# Patient Record
Sex: FEMALE | Race: WHITE | NOT HISPANIC OR LATINO | Employment: OTHER | URBAN - METROPOLITAN AREA
[De-identification: names, ages, dates, MRNs, and addresses within clinical notes are randomized per-mention and may not be internally consistent; named-entity substitution may affect disease eponyms.]

---

## 2023-08-15 LAB — URINE CULTURE: NORMAL

## 2023-08-16 LAB
ALANINE AMINOTRANSFERASE (SGPT) (U/L) IN SER/PLAS: 13 U/L (ref 7–45)
ALBUMIN (G/DL) IN SER/PLAS: 4.2 G/DL (ref 3.4–5)
ALKALINE PHOSPHATASE (U/L) IN SER/PLAS: 101 U/L (ref 33–136)
ANION GAP IN SER/PLAS: 13 MMOL/L (ref 10–20)
ASPARTATE AMINOTRANSFERASE (SGOT) (U/L) IN SER/PLAS: 19 U/L (ref 9–39)
BILIRUBIN TOTAL (MG/DL) IN SER/PLAS: 0.6 MG/DL (ref 0–1.2)
CALCIDIOL (25 OH VITAMIN D3) (NG/ML) IN SER/PLAS: 58 NG/ML
CALCIUM (MG/DL) IN SER/PLAS: 9.7 MG/DL (ref 8.6–10.3)
CARBON DIOXIDE, TOTAL (MMOL/L) IN SER/PLAS: 27 MMOL/L (ref 21–32)
CHLORIDE (MMOL/L) IN SER/PLAS: 100 MMOL/L (ref 98–107)
CHOLESTEROL (MG/DL) IN SER/PLAS: 172 MG/DL (ref 0–199)
CHOLESTEROL IN HDL (MG/DL) IN SER/PLAS: 51.7 MG/DL
CHOLESTEROL/HDL RATIO: 3.3
COBALAMIN (VITAMIN B12) (PG/ML) IN SER/PLAS: 1405 PG/ML (ref 211–911)
CREATININE (MG/DL) IN SER/PLAS: 0.6 MG/DL (ref 0.5–1.05)
ERYTHROCYTE DISTRIBUTION WIDTH (RATIO) BY AUTOMATED COUNT: 13.4 % (ref 11.5–14.5)
ERYTHROCYTE MEAN CORPUSCULAR HEMOGLOBIN CONCENTRATION (G/DL) BY AUTOMATED: 32.2 G/DL (ref 32–36)
ERYTHROCYTE MEAN CORPUSCULAR VOLUME (FL) BY AUTOMATED COUNT: 91 FL (ref 80–100)
ERYTHROCYTES (10*6/UL) IN BLOOD BY AUTOMATED COUNT: 4.11 X10E12/L (ref 4–5.2)
ESTIMATED AVERAGE GLUCOSE FOR HBA1C: 123 MG/DL
GFR FEMALE: 83 ML/MIN/1.73M2
GLUCOSE (MG/DL) IN SER/PLAS: 94 MG/DL (ref 74–99)
HEMATOCRIT (%) IN BLOOD BY AUTOMATED COUNT: 37.6 % (ref 36–46)
HEMOGLOBIN (G/DL) IN BLOOD: 12.1 G/DL (ref 12–16)
HEMOGLOBIN A1C/HEMOGLOBIN TOTAL IN BLOOD: 5.9 %
LDL: 107 MG/DL (ref 0–99)
LEUKOCYTES (10*3/UL) IN BLOOD BY AUTOMATED COUNT: 6.9 X10E9/L (ref 4.4–11.3)
NRBC (PER 100 WBCS) BY AUTOMATED COUNT: 0 /100 WBC (ref 0–0)
PLATELETS (10*3/UL) IN BLOOD AUTOMATED COUNT: 259 X10E9/L (ref 150–450)
POTASSIUM (MMOL/L) IN SER/PLAS: 4.2 MMOL/L (ref 3.5–5.3)
PROTEIN TOTAL: 7.5 G/DL (ref 6.4–8.2)
SODIUM (MMOL/L) IN SER/PLAS: 136 MMOL/L (ref 136–145)
THYROTROPIN (MIU/L) IN SER/PLAS BY DETECTION LIMIT <= 0.05 MIU/L: 4.72 MIU/L (ref 0.44–3.98)
THYROXINE (T4) FREE (NG/DL) IN SER/PLAS: 0.77 NG/DL (ref 0.61–1.12)
TRIGLYCERIDE (MG/DL) IN SER/PLAS: 68 MG/DL (ref 0–149)
UREA NITROGEN (MG/DL) IN SER/PLAS: 12 MG/DL (ref 6–23)
VLDL: 14 MG/DL (ref 0–40)

## 2023-11-09 ENCOUNTER — HOSPITAL ENCOUNTER (EMERGENCY)
Facility: HOSPITAL | Age: 88
Discharge: HOME | End: 2023-11-09
Attending: EMERGENCY MEDICINE
Payer: MEDICARE

## 2023-11-09 ENCOUNTER — APPOINTMENT (OUTPATIENT)
Dept: RADIOLOGY | Facility: HOSPITAL | Age: 88
End: 2023-11-09
Payer: MEDICARE

## 2023-11-09 VITALS
WEIGHT: 110 LBS | HEIGHT: 60 IN | DIASTOLIC BLOOD PRESSURE: 79 MMHG | BODY MASS INDEX: 21.6 KG/M2 | RESPIRATION RATE: 18 BRPM | HEART RATE: 98 BPM | SYSTOLIC BLOOD PRESSURE: 122 MMHG | TEMPERATURE: 97.5 F | OXYGEN SATURATION: 96 %

## 2023-11-09 DIAGNOSIS — R53.1 GENERALIZED WEAKNESS: Primary | ICD-10-CM

## 2023-11-09 DIAGNOSIS — R51.9 ACUTE NONINTRACTABLE HEADACHE, UNSPECIFIED HEADACHE TYPE: ICD-10-CM

## 2023-11-09 LAB
ALBUMIN SERPL BCP-MCNC: 4 G/DL (ref 3.4–5)
ALP SERPL-CCNC: 100 U/L (ref 33–136)
ALT SERPL W P-5'-P-CCNC: 23 U/L (ref 7–45)
ANION GAP SERPL CALC-SCNC: 13 MMOL/L (ref 10–20)
APPEARANCE UR: ABNORMAL
AST SERPL W P-5'-P-CCNC: 51 U/L (ref 9–39)
BASOPHILS # BLD AUTO: 0.02 X10*3/UL (ref 0–0.1)
BASOPHILS NFR BLD AUTO: 0.4 %
BILIRUB SERPL-MCNC: 0.5 MG/DL (ref 0–1.2)
BILIRUB UR STRIP.AUTO-MCNC: NEGATIVE MG/DL
BUN SERPL-MCNC: 16 MG/DL (ref 6–23)
CALCIUM SERPL-MCNC: 9.3 MG/DL (ref 8.6–10.3)
CARDIAC TROPONIN I PNL SERPL HS: 73 NG/L (ref 0–13)
CARDIAC TROPONIN I PNL SERPL HS: 95 NG/L (ref 0–13)
CHLORIDE SERPL-SCNC: 95 MMOL/L (ref 98–107)
CO2 SERPL-SCNC: 27 MMOL/L (ref 21–32)
COLOR UR: YELLOW
CREAT SERPL-MCNC: 0.62 MG/DL (ref 0.5–1.05)
EOSINOPHIL # BLD AUTO: 0 X10*3/UL (ref 0–0.4)
EOSINOPHIL NFR BLD AUTO: 0 %
ERYTHROCYTE [DISTWIDTH] IN BLOOD BY AUTOMATED COUNT: 13.4 % (ref 11.5–14.5)
GFR SERPL CREATININE-BSD FRML MDRD: 83 ML/MIN/1.73M*2
GLUCOSE SERPL-MCNC: 92 MG/DL (ref 74–99)
GLUCOSE UR STRIP.AUTO-MCNC: NEGATIVE MG/DL
HCT VFR BLD AUTO: 38.8 % (ref 36–46)
HGB BLD-MCNC: 12.9 G/DL (ref 12–16)
HOLD SPECIMEN: NORMAL
IMM GRANULOCYTES # BLD AUTO: 0.02 X10*3/UL (ref 0–0.5)
IMM GRANULOCYTES NFR BLD AUTO: 0.4 % (ref 0–0.9)
KETONES UR STRIP.AUTO-MCNC: ABNORMAL MG/DL
LEUKOCYTE ESTERASE UR QL STRIP.AUTO: NEGATIVE
LYMPHOCYTES # BLD AUTO: 0.55 X10*3/UL (ref 0.8–3)
LYMPHOCYTES NFR BLD AUTO: 10.9 %
MAGNESIUM SERPL-MCNC: 1.64 MG/DL (ref 1.6–2.4)
MCH RBC QN AUTO: 30.5 PG (ref 26–34)
MCHC RBC AUTO-ENTMCNC: 33.2 G/DL (ref 32–36)
MCV RBC AUTO: 92 FL (ref 80–100)
MONOCYTES # BLD AUTO: 0.9 X10*3/UL (ref 0.05–0.8)
MONOCYTES NFR BLD AUTO: 17.9 %
MUCOUS THREADS #/AREA URNS AUTO: NORMAL /LPF
NEUTROPHILS # BLD AUTO: 3.55 X10*3/UL (ref 1.6–5.5)
NEUTROPHILS NFR BLD AUTO: 70.4 %
NITRITE UR QL STRIP.AUTO: NEGATIVE
NRBC BLD-RTO: 0 /100 WBCS (ref 0–0)
PH UR STRIP.AUTO: 5 [PH]
PLATELET # BLD AUTO: 184 X10*3/UL (ref 150–450)
POTASSIUM SERPL-SCNC: 4 MMOL/L (ref 3.5–5.3)
PROT SERPL-MCNC: 6.9 G/DL (ref 6.4–8.2)
PROT UR STRIP.AUTO-MCNC: ABNORMAL MG/DL
RBC # BLD AUTO: 4.23 X10*6/UL (ref 4–5.2)
RBC # UR STRIP.AUTO: ABNORMAL /UL
RBC #/AREA URNS AUTO: NORMAL /HPF
SODIUM SERPL-SCNC: 131 MMOL/L (ref 136–145)
SP GR UR STRIP.AUTO: 1.01
SQUAMOUS #/AREA URNS AUTO: NORMAL /HPF
UROBILINOGEN UR STRIP.AUTO-MCNC: <2 MG/DL
WBC # BLD AUTO: 5 X10*3/UL (ref 4.4–11.3)
WBC #/AREA URNS AUTO: NORMAL /HPF

## 2023-11-09 PROCEDURE — 71046 X-RAY EXAM CHEST 2 VIEWS: CPT | Mod: FOREIGN READ | Performed by: RADIOLOGY

## 2023-11-09 PROCEDURE — 80053 COMPREHEN METABOLIC PANEL: CPT | Performed by: PHYSICIAN ASSISTANT

## 2023-11-09 PROCEDURE — 70450 CT HEAD/BRAIN W/O DYE: CPT | Performed by: RADIOLOGY

## 2023-11-09 PROCEDURE — 85025 COMPLETE CBC W/AUTO DIFF WBC: CPT | Performed by: PHYSICIAN ASSISTANT

## 2023-11-09 PROCEDURE — 81001 URINALYSIS AUTO W/SCOPE: CPT | Performed by: PHYSICIAN ASSISTANT

## 2023-11-09 PROCEDURE — 36415 COLL VENOUS BLD VENIPUNCTURE: CPT | Performed by: PHYSICIAN ASSISTANT

## 2023-11-09 PROCEDURE — 99285 EMERGENCY DEPT VISIT HI MDM: CPT | Mod: 25 | Performed by: EMERGENCY MEDICINE

## 2023-11-09 PROCEDURE — 84484 ASSAY OF TROPONIN QUANT: CPT | Performed by: PHYSICIAN ASSISTANT

## 2023-11-09 PROCEDURE — 70450 CT HEAD/BRAIN W/O DYE: CPT

## 2023-11-09 PROCEDURE — 99284 EMERGENCY DEPT VISIT MOD MDM: CPT | Mod: 25

## 2023-11-09 PROCEDURE — 83735 ASSAY OF MAGNESIUM: CPT | Performed by: PHYSICIAN ASSISTANT

## 2023-11-09 PROCEDURE — 71046 X-RAY EXAM CHEST 2 VIEWS: CPT | Mod: FR

## 2023-11-09 RX ORDER — ACETAMINOPHEN 325 MG/1
650 TABLET ORAL ONCE
Status: COMPLETED | OUTPATIENT
Start: 2023-11-09 | End: 2023-11-09

## 2023-11-09 RX ADMIN — ACETAMINOPHEN 650 MG: 325 TABLET ORAL at 11:49

## 2023-11-09 ASSESSMENT — LIFESTYLE VARIABLES
EVER HAD A DRINK FIRST THING IN THE MORNING TO STEADY YOUR NERVES TO GET RID OF A HANGOVER: NO
EVER FELT BAD OR GUILTY ABOUT YOUR DRINKING: NO
REASON UNABLE TO ASSESS: YES
HAVE PEOPLE ANNOYED YOU BY CRITICIZING YOUR DRINKING: NO
HAVE YOU EVER FELT YOU SHOULD CUT DOWN ON YOUR DRINKING: NO

## 2023-11-09 ASSESSMENT — COLUMBIA-SUICIDE SEVERITY RATING SCALE - C-SSRS
6. HAVE YOU EVER DONE ANYTHING, STARTED TO DO ANYTHING, OR PREPARED TO DO ANYTHING TO END YOUR LIFE?: NO
1. IN THE PAST MONTH, HAVE YOU WISHED YOU WERE DEAD OR WISHED YOU COULD GO TO SLEEP AND NOT WAKE UP?: NO
2. HAVE YOU ACTUALLY HAD ANY THOUGHTS OF KILLING YOURSELF?: NO

## 2023-11-09 ASSESSMENT — PAIN DESCRIPTION - PROGRESSION: CLINICAL_PROGRESSION: NOT CHANGED

## 2023-11-09 ASSESSMENT — PAIN - FUNCTIONAL ASSESSMENT: PAIN_FUNCTIONAL_ASSESSMENT: 0-10

## 2023-11-09 ASSESSMENT — PAIN DESCRIPTION - PAIN TYPE: TYPE: OTHER (COMMENT)

## 2023-11-09 ASSESSMENT — PAIN SCALES - GENERAL: PAINLEVEL_OUTOF10: 6

## 2023-11-09 NOTE — ED PROVIDER NOTES
Limitations to History: Slight confusion  External Records Reviewed  Independent Historians: none  Social determinants affecting care: none    HPI  Julissa Plasencia is a 89 y.o. female with history of a brain tumor, who presents emergency department for assessment of left temporal headache.  She reports that she has brain cancer and had a tumor removed.  She has had pain since then.  Pain is located to the left temple.  She reports that she is not taking anything for pain.  She has not had vision change.  She denies nausea or vomiting.  She denies any dizziness or upper or lower extremity numbness, tingling, weakness.  She reports that her headache is located to the left temple and does not radiate.  She reports that it is a constant throbbing sensation.  She has not had head injury.  She has not had recent illness.  She denies any changes to urination.  Denies abdominal pains or diarrhea.  She has not had any cough or congestion.  She is a poor historian due to age and some confusion.    [facility]  Past Medical History:   Diagnosis Date    Abnormal uterine and vaginal bleeding, unspecified 12/28/2016    Abnormal vaginal bleeding    Biliary acute pancreatitis without necrosis or infection 02/02/2017    Acute biliary pancreatitis without infection or necrosis    Brain tumor (CMS/HCC)     Motion sickness, initial encounter 10/11/2016    Sea sickness    Other female genital prolapse 06/13/2017    Pelvic relaxation    Other specified cough 12/30/2014    Cough due to ACE inhibitor    Pain in right shoulder 06/01/2017    Acute pain of right shoulder    Personal history of other diseases of the digestive system 06/05/2017    History of acute pancreatitis    Personal history of other diseases of the digestive system 03/06/2017    History of acute pancreatitis    Personal history of urinary (tract) infections 03/21/2017    History of urinary tract infection    reviewed by myself.    Meds  No current outpatient  medications    Allergies  Not on File reviewed by myself.    SHx  Social History     Tobacco Use    Smoking status: Former     Types: Cigarettes    Smokeless tobacco: Never   Substance Use Topics    Alcohol use: Never    Drug use: Never    reviewed by myself.      ------------------------------------------------------------------------------------------------------------------------------------------    /86   Pulse (!) 123   Temp 36.4 °C (97.5 °F) (Temporal)   Resp 16   Ht 1.524 m (5')   Wt 49.9 kg (110 lb)   LMP  (LMP Unknown) Comment: menapause  SpO2 95%   BMI 21.48 kg/m²     Physical Exam  Vitals and nursing note reviewed.   Constitutional:       General: She is not in acute distress.     Appearance: Normal appearance. She is normal weight. She is not ill-appearing or toxic-appearing.   HENT:      Head: Normocephalic and atraumatic.      Comments: No temporal tenderness.     Nose: Nose normal.      Mouth/Throat:      Mouth: Mucous membranes are moist.      Pharynx: Oropharynx is clear.   Eyes:      Extraocular Movements: Extraocular movements intact.      Conjunctiva/sclera: Conjunctivae normal.   Cardiovascular:      Rate and Rhythm: Regular rhythm. Tachycardia present.   Pulmonary:      Effort: Pulmonary effort is normal.      Breath sounds: Normal breath sounds.   Abdominal:      General: Abdomen is flat. Bowel sounds are normal.      Palpations: Abdomen is soft.      Tenderness: There is no abdominal tenderness.   Musculoskeletal:         General: Normal range of motion.      Cervical back: Neck supple.   Skin:     General: Skin is warm and dry.   Neurological:      General: No focal deficit present.      Mental Status: She is alert.      Cranial Nerves: Cranial nerves 2-12 are intact.      Sensory: Sensation is intact.      Motor: Motor function is intact.      Coordination: Coordination is intact.      Comments: Alert and oriented x2   Psychiatric:         Attention and Perception: Attention  normal.         Mood and Affect: Mood normal.          ------------------------------------------------------------------------------------------------------------------------------------------  Imaging  CT head wo IV contrast   Final Result   No evidence of acute cortical infarct or intracranial hemorrhage.   Stable findings.        MACRO:   None        Signed by: Lolita Walsh 11/9/2023 11:12 AM   Dictation workstation:   [id number]      XR chest 2 views   Final Result   No acute cardiopulmonary abnormality.   Signed by Feliz Merida MD           Labs  Labs Reviewed   CBC WITH AUTO DIFFERENTIAL - Abnormal       Result Value    WBC 5.0      nRBC 0.0      RBC 4.23      Hemoglobin 12.9      Hematocrit 38.8      MCV 92      MCH 30.5      MCHC 33.2      RDW 13.4      Platelets 184      Neutrophils % 70.4      Immature Granulocytes %, Automated 0.4      Lymphocytes % 10.9      Monocytes % 17.9      Eosinophils % 0.0      Basophils % 0.4      Neutrophils Absolute 3.55      Immature Granulocytes Absolute, Automated 0.02      Lymphocytes Absolute 0.55 (*)     Monocytes Absolute 0.90 (*)     Eosinophils Absolute 0.00      Basophils Absolute 0.02     COMPREHENSIVE METABOLIC PANEL - Abnormal    Glucose 92      Sodium 131 (*)     Potassium 4.0      Chloride 95 (*)     Bicarbonate 27      Anion Gap 13      Urea Nitrogen 16      Creatinine 0.62      eGFR 83      Calcium 9.3      Albumin 4.0      Alkaline Phosphatase 100      Total Protein 6.9      AST 51 (*)     Bilirubin, Total 0.5      ALT 23     TROPONIN I, HIGH SENSITIVITY - Abnormal    Troponin I, High Sensitivity 95 (*)     Narrative:     Less than 99th percentile of normal range cutoff-  Female and children under 18 years old <14 ng/L; Male <21 ng/L: Negative  Repeat testing should be performed if clinically indicated.     Female and children under 18 years old 14-50 ng/L; Male 21-50 ng/L:  Consistent with possible cardiac damage and possible increased clinical   risk.  Serial measurements may help to assess extent of myocardial damage.     >50 ng/L: Consistent with cardiac damage, increased clinical risk and  myocardial infarction. Serial measurements may help assess extent of   myocardial damage.      NOTE: Children less than 1 year old may have higher baseline troponin   levels and results should be interpreted in conjunction with the overall   clinical context.     NOTE: Troponin I testing is performed using a different   testing methodology at [facility] than at other   [facility]. Direct result comparisons should only   be made within the same method.   TROPONIN I, HIGH SENSITIVITY - Abnormal    Troponin I, High Sensitivity 73 (*)     Narrative:     Less than 99th percentile of normal range cutoff-  Female and children under 18 years old <14 ng/L; Male <21 ng/L: Negative  Repeat testing should be performed if clinically indicated.     Female and children under 18 years old 14-50 ng/L; Male 21-50 ng/L:  Consistent with possible cardiac damage and possible increased clinical   risk. Serial measurements may help to assess extent of myocardial damage.     >50 ng/L: Consistent with cardiac damage, increased clinical risk and  myocardial infarction. Serial measurements may help assess extent of   myocardial damage.      NOTE: Children less than 1 year old may have higher baseline troponin   levels and results should be interpreted in conjunction with the overall   clinical context.     NOTE: Troponin I testing is performed using a different   testing methodology at [facility] than at other   [facility]. Direct result comparisons should only   be made within the same method.   URINALYSIS WITH REFLEX MICROSCOPIC AND CULTURE - Abnormal    Color, Urine Yellow      Appearance, Urine Hazy (*)     Specific Gravity, Urine 1.015      pH, Urine 5.0      Protein, Urine 100 (2+) (*)     Glucose, Urine NEGATIVE      Blood, Urine LARGE (3+) (*)      Ketones, Urine 20 (1+) (*)     Bilirubin, Urine NEGATIVE      Urobilinogen, Urine <2.0      Nitrite, Urine NEGATIVE      Leukocyte Esterase, Urine NEGATIVE     MAGNESIUM - Normal    Magnesium 1.64     URINALYSIS WITH REFLEX MICROSCOPIC AND CULTURE    Narrative:     The following orders were created for panel order Urinalysis with Reflex Microscopic and Culture.  Procedure                               Abnormality         Status                     ---------                               -----------         ------                     Urinalysis with Reflex M...[[id number]]  Abnormal            Final result               Extra Urine Gray Tube[[id number]]                            In process                   Please view results for these tests on the individual orders.   EXTRA URINE GRAY TUBE   URINALYSIS MICROSCOPIC WITH REFLEX CULTURE    WBC, Urine 1-5      RBC, Urine 3-5      Squamous Epithelial Cells, Urine 1-9 (SPARSE)      Mucus, Urine 1+          ED Course  Diagnoses as of 11/09/23 1455   Generalized weakness   Acute nonintractable headache, unspecified headache type        Medical Decision Making: She did not appear ill or toxic.  Vital signs reviewed.  She is tachycardic.  She is otherwise hemodynamically stable.    Differential diagnoses considered: Brain mass, intracranial hemorrhage, tension headache, others    Medications given: tylenol    EKG interpreted by myself: Sinus tachycardia.  Ventricular rate 103 bpm.  No acute ST elevations or depressions.  Right bundle branch block noted.    I reviewed the labs from today.  No leukocytosis or leukopenia.  H&H is stable.  Sodium 131.  BUN and creatinine normal.  Urinalysis showing large amount of blood but no evidence of UTI.  Initial troponin was elevated 95.  Second troponin still elevated however improved to 73.  She has no active chest pain.  She reports that her headache has resolved after Tylenol.  She does not want to be admitted and wants to go home.   Case was discussed and evaluated with ED attending, Dr. Foote is agreeable to patient plan of care.  I did recommend to the patient that she follow with her PCP in 2 to 3 days for reassessment.  She is to return to the ER immediately if symptoms change or worsen.  She verbalized understanding and agreed to plan of care.  She was discharged home in stable condition.    Diagnosis: Generalized weakness, headache  Plan: Discharge           Tyler Sharma PA-C  11/09/23 1685

## 2023-11-20 ENCOUNTER — APPOINTMENT (OUTPATIENT)
Dept: PRIMARY CARE | Facility: CLINIC | Age: 88
End: 2023-11-20
Payer: MEDICARE

## 2023-11-30 ENCOUNTER — APPOINTMENT (OUTPATIENT)
Dept: PRIMARY CARE | Facility: CLINIC | Age: 88
End: 2023-11-30
Payer: MEDICARE

## 2023-11-30 ENCOUNTER — TELEPHONE (OUTPATIENT)
Dept: PRIMARY CARE | Facility: CLINIC | Age: 88
End: 2023-11-30
Payer: MEDICARE

## 2023-11-30 NOTE — TELEPHONE ENCOUNTER
Fiordaliza from [facility] faxed a required H&P form to be completed by the provider. The patient moved into the facility.   Telephone number [phone]  Fax number [phone]

## 2023-12-01 ENCOUNTER — NURSING HOME VISIT (OUTPATIENT)
Dept: POST ACUTE CARE | Facility: EXTERNAL LOCATION | Age: 88
End: 2023-12-01
Payer: MEDICARE

## 2023-12-01 DIAGNOSIS — R41.0 CONFUSION: Primary | ICD-10-CM

## 2023-12-01 DIAGNOSIS — I10 HYPERTENSION, ESSENTIAL: ICD-10-CM

## 2023-12-01 DIAGNOSIS — R53.1 WEAKNESS: ICD-10-CM

## 2023-12-01 DIAGNOSIS — I65.22 OCCLUSION AND STENOSIS OF LEFT CAROTID ARTERY: ICD-10-CM

## 2023-12-01 PROCEDURE — 99349 HOME/RES VST EST MOD MDM 40: CPT

## 2023-12-01 NOTE — LETTER
Patient: Julissa Plasencia  : 3/1/1929    Encounter Date: 2023    PROGRESS NOTE    Subjective  Chief complaint: Julissa Plasencia is a 89 y.o. female who is an assisted living facility patient being seen and evaluated for general medical care and monthly follow-up    HPI:  Patient seen and evaluated for general medical care and monthly follow-up.  Patient  admitted to AL for assistance with ADLs and medical care.  PMH includes left carotid artery stenosis and occlusion,  HTN, vitamin D deficiency, brain cancer with mass removed, headaches, confusion  Patient had a recent ED evaluation for weakness and headache- discharged home without further intervention.  Patient alert, oriented x 2, converses easily.  BP elevated at time of assessment, possibly due to recent transition from home.  Offers no complaints of visual changes, dizziness, headache, N/V, chest pain, SOB.  Offers no complaints at time.  No concerns per nursing.      Objective  Vital signs:  157/79-97.8-92-17-97%    Physical Exam  Constitutional:       Appearance: Normal appearance.   HENT:      Head: Normocephalic.      Mouth/Throat:      Mouth: Mucous membranes are moist.   Eyes:      Extraocular Movements: Extraocular movements intact.   Cardiovascular:      Rate and Rhythm: Normal rate and regular rhythm.      Pulses: Normal pulses.      Heart sounds: Normal heart sounds.   Pulmonary:      Effort: Pulmonary effort is normal.      Breath sounds: Normal breath sounds.   Abdominal:      General: Bowel sounds are normal.      Palpations: Abdomen is soft.   Musculoskeletal:         General: Normal range of motion.      Cervical back: Normal range of motion and neck supple.      Comments:   Generalized weakness   Skin:     General: Skin is warm and dry.      Capillary Refill: Capillary refill takes less than 2 seconds.   Neurological:      Mental Status: She is alert. Mental status is at baseline.   Psychiatric:         Mood and Affect: Mood normal.          Behavior: Behavior normal.         Assessment/Plan  Problem List Items Addressed This Visit       Hypertension, essential      Stable   BP elevated at time of assessment, possibly due to recent transition to AL   trend for medication adjustment   Metoprolol   monitor           Confusion - Primary      intermittent confusion   ensure safety   reorient when needed   No aggressive/combative behaviors per nursing   monitor         Occlusion and stenosis of left carotid artery      Stable   no dizziness, visual changes, new weakness, syncopal episode   control BP   monitor         Weakness      Reinforced calling for assistance when needed   rollator          Medications, treatments, and labs reviewed  Continue medications and treatments as listed in EMR    MEETA Newton      Electronically Signed By: MEETA Newton   12/28/23  7:26 PM

## 2023-12-22 ENCOUNTER — HOSPITAL ENCOUNTER (OUTPATIENT)
Dept: CARDIOLOGY | Facility: HOSPITAL | Age: 88
Discharge: HOME | End: 2023-12-22
Payer: MEDICARE

## 2023-12-22 PROCEDURE — 93005 ELECTROCARDIOGRAM TRACING: CPT

## 2023-12-27 LAB
ATRIAL RATE: 102 BPM
P AXIS: 76 DEGREES
PR INTERVAL: 166 MS
Q ONSET: 251 MS
QRS COUNT: 17 BEATS
QRS DURATION: 109 MS
QT INTERVAL: 368 MS
QTC CALCULATION(BAZETT): 482 MS
QTC FREDERICIA: 440 MS
R AXIS: 53 DEGREES
T AXIS: 2 DEGREES
T OFFSET: 435 MS
VENTRICULAR RATE: 103 BPM

## 2023-12-28 PROBLEM — R53.1 WEAKNESS: Status: ACTIVE | Noted: 2023-12-28

## 2023-12-28 PROBLEM — S09.90XS HEADACHES DUE TO OLD HEAD INJURY: Status: ACTIVE | Noted: 2023-12-28

## 2023-12-28 PROBLEM — I65.22 OCCLUSION AND STENOSIS OF LEFT CAROTID ARTERY: Status: ACTIVE | Noted: 2023-12-28

## 2023-12-28 PROBLEM — I10 HYPERTENSION, ESSENTIAL: Status: ACTIVE | Noted: 2023-12-28

## 2023-12-28 PROBLEM — R41.0 CONFUSION: Status: ACTIVE | Noted: 2023-12-28

## 2023-12-28 PROBLEM — E55.9 VITAMIN D DEFICIENCY: Status: ACTIVE | Noted: 2023-12-28

## 2023-12-28 PROBLEM — G44.309 HEADACHES DUE TO OLD HEAD INJURY: Status: ACTIVE | Noted: 2023-12-28

## 2023-12-29 NOTE — ASSESSMENT & PLAN NOTE
Stable   BP elevated at time of assessment, possibly due to recent transition to AL   trend for medication adjustment   Metoprolol   monitor

## 2023-12-29 NOTE — ASSESSMENT & PLAN NOTE
intermittent confusion   ensure safety   reorient when needed   No aggressive/combative behaviors per nursing   monitor

## 2023-12-29 NOTE — PROGRESS NOTES
PROGRESS NOTE    Subjective   Chief complaint: Julissa Plasencia is a 89 y.o. female who is an assisted living facility patient being seen and evaluated for general medical care and monthly follow-up    HPI:  Patient seen and evaluated for general medical care and monthly follow-up.  Patient  admitted to AL for assistance with ADLs and medical care.  PMH includes left carotid artery stenosis and occlusion,  HTN, vitamin D deficiency, brain cancer with mass removed, headaches, confusion  Patient had a recent ED evaluation for weakness and headache- discharged home without further intervention.  Patient alert, oriented x 2, converses easily.  BP elevated at time of assessment, possibly due to recent transition from home.  Offers no complaints of visual changes, dizziness, headache, N/V, chest pain, SOB.  Offers no complaints at time.  No concerns per nursing.      Objective   Vital signs:  157/79-97.8-92-17-97%    Physical Exam  Constitutional:       Appearance: Normal appearance.   HENT:      Head: Normocephalic.      Mouth/Throat:      Mouth: Mucous membranes are moist.   Eyes:      Extraocular Movements: Extraocular movements intact.   Cardiovascular:      Rate and Rhythm: Normal rate and regular rhythm.      Pulses: Normal pulses.      Heart sounds: Normal heart sounds.   Pulmonary:      Effort: Pulmonary effort is normal.      Breath sounds: Normal breath sounds.   Abdominal:      General: Bowel sounds are normal.      Palpations: Abdomen is soft.   Musculoskeletal:         General: Normal range of motion.      Cervical back: Normal range of motion and neck supple.      Comments:   Generalized weakness   Skin:     General: Skin is warm and dry.      Capillary Refill: Capillary refill takes less than 2 seconds.   Neurological:      Mental Status: She is alert. Mental status is at baseline.   Psychiatric:         Mood and Affect: Mood normal.         Behavior: Behavior normal.         Assessment/Plan   Problem List Items  Addressed This Visit       Hypertension, essential      Stable   BP elevated at time of assessment, possibly due to recent transition to AL   trend for medication adjustment   Metoprolol   monitor           Confusion - Primary      intermittent confusion   ensure safety   reorient when needed   No aggressive/combative behaviors per nursing   monitor         Occlusion and stenosis of left carotid artery      Stable   no dizziness, visual changes, new weakness, syncopal episode   control BP   monitor         Weakness      Reinforced calling for assistance when needed   rollator          Medications, treatments, and labs reviewed  Continue medications and treatments as listed in EMR    Eli Sanchez, APRN-CNP

## 2024-02-07 ENCOUNTER — NURSING HOME VISIT (OUTPATIENT)
Dept: POST ACUTE CARE | Facility: EXTERNAL LOCATION | Age: 89
End: 2024-02-07
Payer: MEDICARE

## 2024-02-07 DIAGNOSIS — R41.0 CONFUSION: Primary | ICD-10-CM

## 2024-02-07 DIAGNOSIS — I10 HYPERTENSION, ESSENTIAL: ICD-10-CM

## 2024-02-07 DIAGNOSIS — R53.1 WEAKNESS: ICD-10-CM

## 2024-02-07 DIAGNOSIS — M25.561 ACUTE PAIN OF RIGHT KNEE: ICD-10-CM

## 2024-02-07 PROCEDURE — 99349 HOME/RES VST EST MOD MDM 40: CPT

## 2024-02-07 NOTE — LETTER
Patient: Julissa Plasencia  : 3/1/1929    Encounter Date: 2024    PROGRESS NOTE    Subjective  Chief complaint: Julissa Plasencia is a 89 y.o. female who is an assisted living facility patient being seen and evaluated for general medical care and monthly follow-up    HPI:  Patient seen and evaluated for general medical care and monthly follow-up.  Patient  at baseline mentation, calm, cooperative, pleasant. States falling onto right knee 2/5.  ROM intact.  No other injury reported.  No dizziness, weakness.  BP at goal.  Engages in activities with other residents.  No concerns per nursing      Objective  Vital signs:  122/77-97.8-72-18-98%    Physical Exam  Constitutional:       Appearance: Normal appearance.   HENT:      Head: Normocephalic.      Mouth/Throat:      Mouth: Mucous membranes are moist.   Eyes:      Extraocular Movements: Extraocular movements intact.   Cardiovascular:      Rate and Rhythm: Normal rate and regular rhythm.      Pulses: Normal pulses.      Heart sounds: Normal heart sounds.   Pulmonary:      Effort: Pulmonary effort is normal.      Breath sounds: Normal breath sounds.   Abdominal:      General: Bowel sounds are normal.      Palpations: Abdomen is soft.   Musculoskeletal:         General: Normal range of motion.      Cervical back: Normal range of motion and neck supple.      Comments:   Right knee pain    Generalized weakness   Skin:     General: Skin is warm and dry.      Capillary Refill: Capillary refill takes less than 2 seconds.   Neurological:      Mental Status: She is alert. Mental status is at baseline.   Psychiatric:         Mood and Affect: Mood normal.         Behavior: Behavior normal.         Assessment/Plan  Problem List Items Addressed This Visit       Hypertension, essential      Stable   Metoprolol   monitor           Confusion - Primary      intermittent confusion   ensure safety   reorient when needed   No aggressive/combative behaviors per nursing   monitor          Weakness      Reinforced calling for assistance when needed   rollator         Acute pain of right knee      right knee pain s/p fall   no limitation of ROM   Tylenol   monitor          Medications, treatments, and labs reviewed  Continue medications and treatments as listed in EMR    MEETA Newton      Electronically Signed By: MEETA Newton   2/17/24  3:57 PM

## 2024-02-16 DIAGNOSIS — I10 HYPERTENSION, ESSENTIAL: ICD-10-CM

## 2024-02-17 PROBLEM — M25.561 ACUTE PAIN OF RIGHT KNEE: Status: ACTIVE | Noted: 2024-02-17

## 2024-02-17 NOTE — PROGRESS NOTES
PROGRESS NOTE    Subjective   Chief complaint: Julissa Plasencia is a 89 y.o. female who is an assisted living facility patient being seen and evaluated for general medical care and monthly follow-up    HPI:  Patient seen and evaluated for general medical care and monthly follow-up.  Patient  at baseline mentation, calm, cooperative, pleasant. States falling onto right knee 2/5.  ROM intact.  No other injury reported.  No dizziness, weakness.  BP at goal.  Engages in activities with other residents.  No concerns per nursing      Objective   Vital signs:  122/77-97.8-72-18-98%    Physical Exam  Constitutional:       Appearance: Normal appearance.   HENT:      Head: Normocephalic.      Mouth/Throat:      Mouth: Mucous membranes are moist.   Eyes:      Extraocular Movements: Extraocular movements intact.   Cardiovascular:      Rate and Rhythm: Normal rate and regular rhythm.      Pulses: Normal pulses.      Heart sounds: Normal heart sounds.   Pulmonary:      Effort: Pulmonary effort is normal.      Breath sounds: Normal breath sounds.   Abdominal:      General: Bowel sounds are normal.      Palpations: Abdomen is soft.   Musculoskeletal:         General: Normal range of motion.      Cervical back: Normal range of motion and neck supple.      Comments:   Right knee pain    Generalized weakness   Skin:     General: Skin is warm and dry.      Capillary Refill: Capillary refill takes less than 2 seconds.   Neurological:      Mental Status: She is alert. Mental status is at baseline.   Psychiatric:         Mood and Affect: Mood normal.         Behavior: Behavior normal.         Assessment/Plan   Problem List Items Addressed This Visit       Hypertension, essential      Stable   Metoprolol   monitor           Confusion - Primary      intermittent confusion   ensure safety   reorient when needed   No aggressive/combative behaviors per nursing   monitor         Weakness      Reinforced calling for assistance when needed    rollator         Acute pain of right knee      right knee pain s/p fall   no limitation of ROM   Tylenol   monitor          Medications, treatments, and labs reviewed  Continue medications and treatments as listed in EMR    Eli Sanchez, APRN-CNP

## 2024-02-19 RX ORDER — METOPROLOL SUCCINATE 50 MG/1
50 TABLET, EXTENDED RELEASE ORAL DAILY
Qty: 90 TABLET | Refills: 3 | Status: SHIPPED | OUTPATIENT
Start: 2024-02-19

## 2024-02-28 ENCOUNTER — APPOINTMENT (OUTPATIENT)
Dept: VASCULAR MEDICINE | Facility: CLINIC | Age: 89
End: 2024-02-28
Payer: MEDICARE

## 2024-03-08 ENCOUNTER — NURSING HOME VISIT (OUTPATIENT)
Dept: POST ACUTE CARE | Facility: EXTERNAL LOCATION | Age: 89
End: 2024-03-08
Payer: MEDICARE

## 2024-03-08 DIAGNOSIS — G44.309 HEADACHES DUE TO OLD HEAD INJURY: ICD-10-CM

## 2024-03-08 DIAGNOSIS — S09.90XS HEADACHES DUE TO OLD HEAD INJURY: ICD-10-CM

## 2024-03-08 DIAGNOSIS — I65.22 OCCLUSION AND STENOSIS OF LEFT CAROTID ARTERY: ICD-10-CM

## 2024-03-08 DIAGNOSIS — R53.1 WEAKNESS: ICD-10-CM

## 2024-03-08 DIAGNOSIS — I10 HYPERTENSION, ESSENTIAL: Primary | ICD-10-CM

## 2024-03-08 PROCEDURE — 99349 HOME/RES VST EST MOD MDM 40: CPT

## 2024-03-08 NOTE — LETTER
Patient: Julissa Plasencia  : 3/1/1929    Encounter Date: 2024    PROGRESS NOTE    Subjective  Chief complaint: Julissa Plasencia is a 89 y.o. female who is an assisted living facility patient being seen and evaluated for general medical care and monthly follow-up    HPI:  Patient seen and evaluated for general medical care and monthly follow-up.  Patient at baseline mentation, pleasant- states doing well today.  BP within goal.  Offers no complaints of dizziness or lightheadedness.  No recent falls.  Appetite good.  No concerns per nursing      Objective  Vital signs:  130/82-97.7-84-16-96%    Physical Exam  Constitutional:       Appearance: Normal appearance.   HENT:      Head: Normocephalic.      Mouth/Throat:      Mouth: Mucous membranes are moist.   Eyes:      Extraocular Movements: Extraocular movements intact.   Cardiovascular:      Rate and Rhythm: Normal rate and regular rhythm.      Pulses: Normal pulses.      Heart sounds: Normal heart sounds.   Pulmonary:      Effort: Pulmonary effort is normal.      Breath sounds: Normal breath sounds.   Abdominal:      General: Bowel sounds are normal.      Palpations: Abdomen is soft.   Musculoskeletal:         General: Normal range of motion.      Cervical back: Normal range of motion and neck supple.      Comments:  generalized weakness   Skin:     General: Skin is warm and dry.      Capillary Refill: Capillary refill takes less than 2 seconds.   Neurological:      Mental Status: She is alert. Mental status is at baseline.   Psychiatric:         Mood and Affect: Mood normal.         Behavior: Behavior normal.         Assessment/Plan  Problem List Items Addressed This Visit       Hypertension, essential - Primary      Stable   Metoprolol   monitor           Occlusion and stenosis of left carotid artery      Stable   no dizziness, visual changes, new weakness, syncopal episode   control BP   monitor         Headaches due to old head injury      Stable   pain managed with  Tylenol   monitor           Weakness      Reinforced calling for assistance when needed   rollator          Medications, treatments, and labs reviewed  Continue medications and treatments as listed in EMR    MEETA Newton      Electronically Signed By: MEETA Newton   3/20/24  6:24 PM

## 2024-03-20 NOTE — PROGRESS NOTES
PROGRESS NOTE    Subjective   Chief complaint: Julissa Plasencia is a 89 y.o. female who is an assisted living facility patient being seen and evaluated for general medical care and monthly follow-up    HPI:  Patient seen and evaluated for general medical care and monthly follow-up.  Patient at baseline mentation, pleasant- states doing well today.  BP within goal.  Offers no complaints of dizziness or lightheadedness.  No recent falls.  Appetite good.  No concerns per nursing      Objective   Vital signs:  130/82-97.7-84-16-96%    Physical Exam  Constitutional:       Appearance: Normal appearance.   HENT:      Head: Normocephalic.      Mouth/Throat:      Mouth: Mucous membranes are moist.   Eyes:      Extraocular Movements: Extraocular movements intact.   Cardiovascular:      Rate and Rhythm: Normal rate and regular rhythm.      Pulses: Normal pulses.      Heart sounds: Normal heart sounds.   Pulmonary:      Effort: Pulmonary effort is normal.      Breath sounds: Normal breath sounds.   Abdominal:      General: Bowel sounds are normal.      Palpations: Abdomen is soft.   Musculoskeletal:         General: Normal range of motion.      Cervical back: Normal range of motion and neck supple.      Comments:  generalized weakness   Skin:     General: Skin is warm and dry.      Capillary Refill: Capillary refill takes less than 2 seconds.   Neurological:      Mental Status: She is alert. Mental status is at baseline.   Psychiatric:         Mood and Affect: Mood normal.         Behavior: Behavior normal.         Assessment/Plan   Problem List Items Addressed This Visit       Hypertension, essential - Primary      Stable   Metoprolol   monitor           Occlusion and stenosis of left carotid artery      Stable   no dizziness, visual changes, new weakness, syncopal episode   control BP   monitor         Headaches due to old head injury      Stable   pain managed with Tylenol   monitor           Weakness      Reinforced calling for  assistance when needed   rollator          Medications, treatments, and labs reviewed  Continue medications and treatments as listed in EMR    Eli Sanchez, APRN-CNP

## 2024-04-05 ENCOUNTER — NURSING HOME VISIT (OUTPATIENT)
Dept: POST ACUTE CARE | Facility: EXTERNAL LOCATION | Age: 89
End: 2024-04-05
Payer: MEDICARE

## 2024-04-05 DIAGNOSIS — I65.22 OCCLUSION AND STENOSIS OF LEFT CAROTID ARTERY: ICD-10-CM

## 2024-04-05 DIAGNOSIS — R53.1 WEAKNESS: ICD-10-CM

## 2024-04-05 DIAGNOSIS — I10 HYPERTENSION, ESSENTIAL: Primary | ICD-10-CM

## 2024-04-05 PROCEDURE — 99349 HOME/RES VST EST MOD MDM 40: CPT

## 2024-04-05 NOTE — LETTER
Patient: Julissa Plasencia  : 3/1/1929    Encounter Date: 2024    PROGRESS NOTE    Subjective  Chief complaint: Julissa Plasencia is a 89 y.o. female who is an assisted living facility patient being seen and evaluated for general medical care and monthly follow-up    HPI:  Patient seen and evaluated for general medical care and monthly follow-up.  Patient  at baseline mentation, calm, cooperative, pleasant. Engages in activities and meals with other residents.  Patient offers no complaints at time.  No concerns per nursing      Objective  Vital signs:  151/53-97.5-89-16-97%    Physical Exam  Constitutional:       Appearance: Normal appearance.   HENT:      Head: Normocephalic.      Mouth/Throat:      Mouth: Mucous membranes are moist.   Eyes:      Extraocular Movements: Extraocular movements intact.   Cardiovascular:      Rate and Rhythm: Normal rate and regular rhythm.      Pulses: Normal pulses.      Heart sounds: Normal heart sounds.   Pulmonary:      Effort: Pulmonary effort is normal.      Breath sounds: Normal breath sounds.   Abdominal:      General: Bowel sounds are normal.      Palpations: Abdomen is soft.   Musculoskeletal:         General: Normal range of motion.      Cervical back: Normal range of motion and neck supple.      Comments: .  Generalized weakness   Skin:     General: Skin is warm and dry.      Capillary Refill: Capillary refill takes less than 2 seconds.   Neurological:      Mental Status: She is alert. Mental status is at baseline.   Psychiatric:         Mood and Affect: Mood normal.         Behavior: Behavior normal.         Assessment/Plan  Problem List Items Addressed This Visit       Hypertension, essential - Primary      Stable-.  BP elevated at time of assessment   trend for medication adjustment   Metoprolol   monitor           Occlusion and stenosis of left carotid artery      Stable   no dizziness, visual changes, new weakness, syncopal episode   control BP   monitor         Weakness       Reinforced calling for assistance when needed   rollator          Medications, treatments, and labs reviewed  Continue medications and treatments as listed in EMR.  I know because you have got 7 minutes on his bedtime    MEETA Newton      Electronically Signed By: MEETA Newton   4/26/24 10:24 PM

## 2024-04-10 ENCOUNTER — NURSING HOME VISIT (OUTPATIENT)
Dept: POST ACUTE CARE | Facility: EXTERNAL LOCATION | Age: 89
End: 2024-04-10
Payer: MEDICARE

## 2024-04-10 DIAGNOSIS — I10 HYPERTENSION, ESSENTIAL: ICD-10-CM

## 2024-04-10 DIAGNOSIS — R53.1 WEAKNESS: ICD-10-CM

## 2024-04-10 DIAGNOSIS — I65.22 OCCLUSION AND STENOSIS OF LEFT CAROTID ARTERY: ICD-10-CM
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
## 2024-04-10 DIAGNOSIS — M25.561 ACUTE PAIN OF RIGHT KNEE: Primary | ICD-10-CM

## 2024-04-10 PROCEDURE — 99349 HOME/RES VST EST MOD MDM 40: CPT

## 2024-04-10 NOTE — LETTER
Patient: Julissa Plasencia  : 3/1/1929    Encounter Date: 04/10/2024    PROGRESS NOTE    Subjective  Chief complaint: Julissa Plasencia is a 89 y.o. female who is an assisted living facility patient being seen and evaluated for right knee pain    HPI:  Patient seen and evaluated for right knee pain s/p fall .  Patient able to ambulate.  Small abrasion noted.  No other injuries reported.  Discussed safety ambulation and therapy.  BP elevated at time of assessment-.  Continue to monitor for medication adjustment.  No other concerns per nursing      Objective  Vital signs:  151/53-97.8-95-18-97%    Physical Exam  Constitutional:       Appearance: Normal appearance.   HENT:      Head: Normocephalic.      Mouth/Throat:      Mouth: Mucous membranes are moist.   Eyes:      Extraocular Movements: Extraocular movements intact.   Cardiovascular:      Rate and Rhythm: Normal rate and regular rhythm.      Pulses: Normal pulses.      Heart sounds: Normal heart sounds.   Pulmonary:      Effort: Pulmonary effort is normal.      Breath sounds: Normal breath sounds.   Abdominal:      General: Bowel sounds are normal.      Palpations: Abdomen is soft.   Musculoskeletal:         General: Normal range of motion.      Cervical back: Normal range of motion and neck supple.      Comments: .  Right knee abrasion   Skin:     General: Skin is warm and dry.      Capillary Refill: Capillary refill takes less than 2 seconds.   Neurological:      Mental Status: She is alert. Mental status is at baseline.   Psychiatric:         Mood and Affect: Mood normal.         Behavior: Behavior normal.         Assessment/Plan  Problem List Items Addressed This Visit       Hypertension, essential      Stable-BP elevated at time of assessment   trend for medication adjustment   Metoprolol   monitor           Occlusion and stenosis of left carotid artery      Stable   no dizziness, visual changes, new weakness, syncopal episode   control BP   monitor          Weakness      Reinforced calling for assistance when needed   rollator         Acute pain of right knee - Primary      right knee pain with abrasion s/p fall   no limitation of ROM   Tylenol   monitor         Fall      fall on 4/9   No precipitating dizziness, lightheadedness   sustained right knee abrasion-.  No limitation with ROM, weightbearing   monitor          Medications, treatments, and labs reviewed  Continue medications and treatments as listed in EMR    MEETA Newton      Electronically Signed By: MEETA Newton   4/28/24  9:03 AM

## 2024-04-17 ENCOUNTER — NURSING HOME VISIT (OUTPATIENT)
Dept: POST ACUTE CARE | Facility: EXTERNAL LOCATION | Age: 89
End: 2024-04-17
Payer: MEDICARE

## 2024-04-17 DIAGNOSIS — R07.81 RIB PAIN: ICD-10-CM

## 2024-04-17 DIAGNOSIS — R53.1 WEAKNESS: Primary | ICD-10-CM

## 2024-04-17 DIAGNOSIS — I65.22 OCCLUSION AND STENOSIS OF LEFT CAROTID ARTERY: ICD-10-CM

## 2024-04-17 DIAGNOSIS — I10 HYPERTENSION, ESSENTIAL: ICD-10-CM

## 2024-04-17 DIAGNOSIS — M25.561 ACUTE PAIN OF RIGHT KNEE: ICD-10-CM

## 2024-04-17 PROCEDURE — 99348 HOME/RES VST EST LOW MDM 30: CPT

## 2024-04-17 NOTE — LETTER
Patient: Julissa Plasencia  : 3/1/1929    Encounter Date: 2024    PROGRESS NOTE    Subjective  Chief complaint: Julissa Plasencia is a 89 y.o. female who is an assisted living facility patient being seen and evaluated for rib pain    HPI:  Patient seen and evaluated for c/o rib pain. States falling several weeks ago but did not report fall. Offers no c/o sob, F/C. No bruising or tenderness noted. Taking Tylenol for discomfort. No other concerns per nursing.      Objective  Vital signs: 151/53-97.8-95-18-97%      Physical Exam  Constitutional:       Appearance: Normal appearance.   HENT:      Head: Normocephalic.      Mouth/Throat:      Mouth: Mucous membranes are moist.   Eyes:      Extraocular Movements: Extraocular movements intact.   Cardiovascular:      Rate and Rhythm: Normal rate and regular rhythm.      Pulses: Normal pulses.      Heart sounds: Normal heart sounds.   Pulmonary:      Effort: Pulmonary effort is normal.      Breath sounds: Normal breath sounds.   Abdominal:      General: Bowel sounds are normal.      Palpations: Abdomen is soft.   Musculoskeletal:         General: Normal range of motion.      Cervical back: Normal range of motion and neck supple.      Comments: Generalized weakness   Skin:     General: Skin is warm and dry.      Capillary Refill: Capillary refill takes less than 2 seconds.   Neurological:      Mental Status: She is alert. Mental status is at baseline.   Psychiatric:         Mood and Affect: Mood normal.         Behavior: Behavior normal.         Assessment/Plan  Problem List Items Addressed This Visit       Hypertension, essential      Stable-BP elevated at time of assessment   trend for medication adjustment   Metoprolol   monitor           Occlusion and stenosis of left carotid artery      Stable   no dizziness, visual changes, new weakness, syncopal episode   control BP   monitor         Weakness - Primary      Reinforced calling for assistance when needed   rollator          Acute pain of right knee      right knee pain with abrasion s/p fall   no limitation of ROM   Tylenol   monitor         Rib pain     Diffuse rib pain s/p fall several weeks ago which was unreported  Tylenol for pain  No sob, abnormalities- LCTA          Medications, treatments, and labs reviewed  Continue medications and treatments as listed in EMR    MEETA Newton      Electronically Signed By: MEETA Newton   4/28/24  9:11 AM

## 2024-04-27 NOTE — ASSESSMENT & PLAN NOTE
Stable-.  BP elevated at time of assessment   trend for medication adjustment   Metoprolol   monitor

## 2024-04-27 NOTE — PROGRESS NOTES
PROGRESS NOTE    Subjective   Chief complaint: Julissa Plasencia is a 89 y.o. female who is an assisted living facility patient being seen and evaluated for general medical care and monthly follow-up    HPI:  Patient seen and evaluated for general medical care and monthly follow-up.  Patient  at baseline mentation, calm, cooperative, pleasant. Engages in activities and meals with other residents.  Patient offers no complaints at time.  No concerns per nursing      Objective   Vital signs:  151/53-97.5-89-16-97%    Physical Exam  Constitutional:       Appearance: Normal appearance.   HENT:      Head: Normocephalic.      Mouth/Throat:      Mouth: Mucous membranes are moist.   Eyes:      Extraocular Movements: Extraocular movements intact.   Cardiovascular:      Rate and Rhythm: Normal rate and regular rhythm.      Pulses: Normal pulses.      Heart sounds: Normal heart sounds.   Pulmonary:      Effort: Pulmonary effort is normal.      Breath sounds: Normal breath sounds.   Abdominal:      General: Bowel sounds are normal.      Palpations: Abdomen is soft.   Musculoskeletal:         General: Normal range of motion.      Cervical back: Normal range of motion and neck supple.      Comments: .  Generalized weakness   Skin:     General: Skin is warm and dry.      Capillary Refill: Capillary refill takes less than 2 seconds.   Neurological:      Mental Status: She is alert. Mental status is at baseline.   Psychiatric:         Mood and Affect: Mood normal.         Behavior: Behavior normal.         Assessment/Plan   Problem List Items Addressed This Visit       Hypertension, essential - Primary      Stable-.  BP elevated at time of assessment   trend for medication adjustment   Metoprolol   monitor           Occlusion and stenosis of left carotid artery      Stable   no dizziness, visual changes, new weakness, syncopal episode   control BP   monitor         Weakness      Reinforced calling for assistance when needed   rollator           Medications, treatments, and labs reviewed  Continue medications and treatments as listed in EMR.  I know because you have got 7 minutes on his bedtime    Eli Sanchez, APRN-CNP
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
## 2024-04-28 PROBLEM — R07.81 RIB PAIN: Status: ACTIVE | Noted: 2024-04-28

## 2024-04-28 NOTE — ASSESSMENT & PLAN NOTE
Diffuse rib pain s/p fall several weeks ago which was unreported  Tylenol for pain  No sob, abnormalities- LCTA

## 2024-04-28 NOTE — ASSESSMENT & PLAN NOTE
fall on 4/9   No precipitating dizziness, lightheadedness   sustained right knee abrasion-.  No limitation with ROM, weightbearing   monitor

## 2024-04-28 NOTE — ASSESSMENT & PLAN NOTE
Stable-BP elevated at time of assessment   trend for medication adjustment   Metoprolol   monitor

## 2024-04-28 NOTE — PROGRESS NOTES
PROGRESS NOTE    Subjective   Chief complaint: Julissa Plasencia is a 89 y.o. female who is an assisted living facility patient being seen and evaluated for rib pain    HPI:  Patient seen and evaluated for c/o rib pain. States falling several weeks ago but did not report fall. Offers no c/o sob, F/C. No bruising or tenderness noted. Taking Tylenol for discomfort. No other concerns per nursing.      Objective   Vital signs: 151/53-97.8-95-18-97%      Physical Exam  Constitutional:       Appearance: Normal appearance.   HENT:      Head: Normocephalic.      Mouth/Throat:      Mouth: Mucous membranes are moist.   Eyes:      Extraocular Movements: Extraocular movements intact.   Cardiovascular:      Rate and Rhythm: Normal rate and regular rhythm.      Pulses: Normal pulses.      Heart sounds: Normal heart sounds.   Pulmonary:      Effort: Pulmonary effort is normal.      Breath sounds: Normal breath sounds.   Abdominal:      General: Bowel sounds are normal.      Palpations: Abdomen is soft.   Musculoskeletal:         General: Normal range of motion.      Cervical back: Normal range of motion and neck supple.      Comments: Generalized weakness   Skin:     General: Skin is warm and dry.      Capillary Refill: Capillary refill takes less than 2 seconds.   Neurological:      Mental Status: She is alert. Mental status is at baseline.   Psychiatric:         Mood and Affect: Mood normal.         Behavior: Behavior normal.         Assessment/Plan   Problem List Items Addressed This Visit       Hypertension, essential      Stable-BP elevated at time of assessment   trend for medication adjustment   Metoprolol   monitor           Occlusion and stenosis of left carotid artery      Stable   no dizziness, visual changes, new weakness, syncopal episode   control BP   monitor         Weakness - Primary      Reinforced calling for assistance when needed   rollator         Acute pain of right knee      right knee pain with abrasion s/p  fall   no limitation of ROM   Tylenol   monitor         Rib pain     Diffuse rib pain s/p fall several weeks ago which was unreported  Tylenol for pain  No sob, abnormalities- LCTA          Medications, treatments, and labs reviewed  Continue medications and treatments as listed in EMR    Eli Sanchez APRN-CNP

## 2024-04-28 NOTE — PROGRESS NOTES
PROGRESS NOTE    Subjective   Chief complaint: Julissa Plasencia is a 89 y.o. female who is an assisted living facility patient being seen and evaluated for right knee pain    HPI:  Patient seen and evaluated for right knee pain s/p fall 4/9.  Patient able to ambulate.  Small abrasion noted.  No other injuries reported.  Discussed safety ambulation and therapy.  BP elevated at time of assessment-.  Continue to monitor for medication adjustment.  No other concerns per nursing      Objective   Vital signs:  151/53-97.8-95-18-97%    Physical Exam  Constitutional:       Appearance: Normal appearance.   HENT:      Head: Normocephalic.      Mouth/Throat:      Mouth: Mucous membranes are moist.   Eyes:      Extraocular Movements: Extraocular movements intact.   Cardiovascular:      Rate and Rhythm: Normal rate and regular rhythm.      Pulses: Normal pulses.      Heart sounds: Normal heart sounds.   Pulmonary:      Effort: Pulmonary effort is normal.      Breath sounds: Normal breath sounds.   Abdominal:      General: Bowel sounds are normal.      Palpations: Abdomen is soft.   Musculoskeletal:         General: Normal range of motion.      Cervical back: Normal range of motion and neck supple.      Comments: .  Right knee abrasion   Skin:     General: Skin is warm and dry.      Capillary Refill: Capillary refill takes less than 2 seconds.   Neurological:      Mental Status: She is alert. Mental status is at baseline.   Psychiatric:         Mood and Affect: Mood normal.         Behavior: Behavior normal.         Assessment/Plan   Problem List Items Addressed This Visit       Hypertension, essential      Stable-BP elevated at time of assessment   trend for medication adjustment   Metoprolol   monitor           Occlusion and stenosis of left carotid artery      Stable   no dizziness, visual changes, new weakness, syncopal episode   control BP   monitor         Weakness      Reinforced calling for assistance when needed   rollator          Acute pain of right knee - Primary      right knee pain with abrasion s/p fall   no limitation of ROM   Tylenol   monitor         Fall      fall on 4/9   No precipitating dizziness, lightheadedness   sustained right knee abrasion-.  No limitation with ROM, weightbearing   monitor          Medications, treatments, and labs reviewed  Continue medications and treatments as listed in EMR    Eli Sanchez, APRN-CNP     Detail Level: Simple Render Risk Assessment In Note?: no Additional Notes: Pt was offered treatment with LN2. Pt declines treatment today. 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

## 2024-04-28 NOTE — ASSESSMENT & PLAN NOTE
right knee pain with abrasion s/p fall   no limitation of ROM   Tylenol   monitor   Universal Safety Interventions

## 2024-05-24 ENCOUNTER — NURSING HOME VISIT (OUTPATIENT)
Dept: POST ACUTE CARE | Facility: EXTERNAL LOCATION | Age: 89
End: 2024-05-24

## 2024-05-24 DIAGNOSIS — R53.1 WEAKNESS: ICD-10-CM

## 2024-05-24 DIAGNOSIS — I10 HYPERTENSION, ESSENTIAL: Primary | ICD-10-CM

## 2024-05-24 DIAGNOSIS — S09.90XS HEADACHES DUE TO OLD HEAD INJURY: ICD-10-CM

## 2024-05-24 DIAGNOSIS — G44.309 HEADACHES DUE TO OLD HEAD INJURY: ICD-10-CM

## 2024-05-24 PROCEDURE — 99348 HOME/RES VST EST LOW MDM 30: CPT

## 2024-05-24 NOTE — LETTER
Patient: Julissa Plasencia  : 3/1/1929    Encounter Date: 2024    PROGRESS NOTE    Subjective  Chief complaint: Julissa Plasencia is a 89 y.o. female who is an assisted living facility patient being seen and evaluated for general medical care and monthly follow-up    HPI:  Patient seen and evaluated for general medical care monthly follow-up.  Patient at baseline mentation, pleasant, cooperative.  Offers no complaints at time.  Appetite good.  Sleeping well.  Engages with other residents in activities and at meals.  No concerns per nursing      Objective  Vital signs:  170/65-97.8-81-18-98%    Physical Exam  Constitutional:       Appearance: Normal appearance.   HENT:      Head: Normocephalic.      Mouth/Throat:      Mouth: Mucous membranes are moist.   Eyes:      Extraocular Movements: Extraocular movements intact.   Cardiovascular:      Rate and Rhythm: Normal rate and regular rhythm.      Pulses: Normal pulses.      Heart sounds: Normal heart sounds.   Pulmonary:      Effort: Pulmonary effort is normal.      Breath sounds: Normal breath sounds.   Abdominal:      General: Bowel sounds are normal.      Palpations: Abdomen is soft.   Musculoskeletal:         General: Normal range of motion.      Cervical back: Normal range of motion and neck supple.      Comments:  weakness   Skin:     General: Skin is warm and dry.      Capillary Refill: Capillary refill takes less than 2 seconds.   Neurological:      Mental Status: She is alert. Mental status is at baseline.   Psychiatric:         Mood and Affect: Mood normal.         Behavior: Behavior normal.         Assessment/Plan  Problem List Items Addressed This Visit       Hypertension, essential - Primary      Stable-BP elevated at time of assessment   trend for medication adjustment   Metoprolol   monitor           Headaches due to old head injury      Stable   pain managed with Tylenol   monitor           Weakness      Reinforced calling for assistance when needed    rollator          Medications, treatments, and labs reviewed  Continue medications and treatments as listed in EMR    MEETA Newton      Electronically Signed By: MEETA Newton   5/28/24  1:16 PM

## 2024-05-28 NOTE — PROGRESS NOTES
PROGRESS NOTE    Subjective   Chief complaint: Julissa Plasencia is a 89 y.o. female who is an assisted living facility patient being seen and evaluated for general medical care and monthly follow-up    HPI:  Patient seen and evaluated for general medical care monthly follow-up.  Patient at baseline mentation, pleasant, cooperative.  Offers no complaints at time.  Appetite good.  Sleeping well.  Engages with other residents in activities and at meals.  No concerns per nursing      Objective   Vital signs:  170/65-97.8-81-18-98%    Physical Exam  Constitutional:       Appearance: Normal appearance.   HENT:      Head: Normocephalic.      Mouth/Throat:      Mouth: Mucous membranes are moist.   Eyes:      Extraocular Movements: Extraocular movements intact.   Cardiovascular:      Rate and Rhythm: Normal rate and regular rhythm.      Pulses: Normal pulses.      Heart sounds: Normal heart sounds.   Pulmonary:      Effort: Pulmonary effort is normal.      Breath sounds: Normal breath sounds.   Abdominal:      General: Bowel sounds are normal.      Palpations: Abdomen is soft.   Musculoskeletal:         General: Normal range of motion.      Cervical back: Normal range of motion and neck supple.      Comments:  weakness   Skin:     General: Skin is warm and dry.      Capillary Refill: Capillary refill takes less than 2 seconds.   Neurological:      Mental Status: She is alert. Mental status is at baseline.   Psychiatric:         Mood and Affect: Mood normal.         Behavior: Behavior normal.         Assessment/Plan   Problem List Items Addressed This Visit       Hypertension, essential - Primary      Stable-BP elevated at time of assessment   trend for medication adjustment   Metoprolol   monitor           Headaches due to old head injury      Stable   pain managed with Tylenol   monitor           Weakness      Reinforced calling for assistance when needed   rollator          Medications, treatments, and labs reviewed  Continue  medications and treatments as listed in EMR    Eli Sanchez, APRN-CNP

## 2024-06-26 ENCOUNTER — NURSING HOME VISIT (OUTPATIENT)
Dept: POST ACUTE CARE | Facility: EXTERNAL LOCATION | Age: 89
End: 2024-06-26
Payer: MEDICARE

## 2024-06-26 DIAGNOSIS — R53.1 WEAKNESS: ICD-10-CM

## 2024-06-26 DIAGNOSIS — S09.90XS HEADACHES DUE TO OLD HEAD INJURY: ICD-10-CM

## 2024-06-26 DIAGNOSIS — I65.22 OCCLUSION AND STENOSIS OF LEFT CAROTID ARTERY: ICD-10-CM

## 2024-06-26 DIAGNOSIS — G44.309 HEADACHES DUE TO OLD HEAD INJURY: ICD-10-CM

## 2024-06-26 DIAGNOSIS — I10 HYPERTENSION, ESSENTIAL: Primary | ICD-10-CM

## 2024-06-26 PROCEDURE — 99348 HOME/RES VST EST LOW MDM 30: CPT

## 2024-06-26 NOTE — LETTER
Patient: Julissa Plasencia  : 3/1/1929    Encounter Date: 2024    PROGRESS NOTE    Subjective  Chief complaint: Julissa Plasencia is a 89 y.o. female who is an assisted living facility patient being seen and evaluated for general medical care and monthly follow-up    HPI:  Patient seen and evaluated for general medical care and monthly follow-up.  Patient  at baseline mentation, calm, cooperative, pleasant. Engages in activities and meals with other residents.  Patient offers no complaints at time.  No concerns per nursing      Objective  Vital signs: 139/57-98.1-72-18-98%    Physical Exam  Constitutional:       Appearance: Normal appearance.   HENT:      Head: Normocephalic.      Mouth/Throat:      Mouth: Mucous membranes are moist.   Eyes:      Extraocular Movements: Extraocular movements intact.   Cardiovascular:      Rate and Rhythm: Normal rate and regular rhythm.      Pulses: Normal pulses.      Heart sounds: Normal heart sounds.   Pulmonary:      Effort: Pulmonary effort is normal.      Breath sounds: Normal breath sounds.   Abdominal:      General: Bowel sounds are normal.      Palpations: Abdomen is soft.   Musculoskeletal:         General: Normal range of motion.      Cervical back: Normal range of motion and neck supple.      Comments:  weakness   Skin:     General: Skin is warm and dry.      Capillary Refill: Capillary refill takes less than 2 seconds.   Neurological:      Mental Status: She is alert. Mental status is at baseline.   Psychiatric:         Mood and Affect: Mood normal.         Behavior: Behavior normal.         Assessment/Plan  Problem List Items Addressed This Visit       Hypertension, essential - Primary      Metoprolol   monitor           Occlusion and stenosis of left carotid artery      Stable   no dizziness, visual changes, new weakness, syncopal episode   control BP   monitor         Headaches due to old head injury      Stable   pain managed with Tylenol   monitor           Weakness       Reinforced calling for assistance when needed   rollator          Medications, treatments, and labs reviewed  Continue medications and treatments as listed in EMR    MEETA Newton      Electronically Signed By: MEETA Newton   6/30/24  9:34 PM

## 2024-07-01 ENCOUNTER — APPOINTMENT (OUTPATIENT)
Dept: RADIOLOGY | Facility: HOSPITAL | Age: 89
End: 2024-07-01
Payer: MEDICARE

## 2024-07-01 ENCOUNTER — HOSPITAL ENCOUNTER (OUTPATIENT)
Facility: HOSPITAL | Age: 89
Setting detail: OBSERVATION
Discharge: HOME HEALTH | End: 2024-07-03
Attending: STUDENT IN AN ORGANIZED HEALTH CARE EDUCATION/TRAINING PROGRAM | Admitting: INTERNAL MEDICINE
Payer: MEDICARE

## 2024-07-01 DIAGNOSIS — S32.592A: Primary | ICD-10-CM

## 2024-07-01 DIAGNOSIS — R53.1 WEAKNESS: ICD-10-CM
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
## 2024-07-01 PROCEDURE — 2500000004 HC RX 250 GENERAL PHARMACY W/ HCPCS (ALT 636 FOR OP/ED): Performed by: NURSE PRACTITIONER

## 2024-07-01 PROCEDURE — 96374 THER/PROPH/DIAG INJ IV PUSH: CPT | Performed by: INTERNAL MEDICINE

## 2024-07-01 PROCEDURE — 72192 CT PELVIS W/O DYE: CPT

## 2024-07-01 PROCEDURE — G0378 HOSPITAL OBSERVATION PER HR: HCPCS

## 2024-07-01 PROCEDURE — 99285 EMERGENCY DEPT VISIT HI MDM: CPT | Mod: 25

## 2024-07-01 PROCEDURE — 72192 CT PELVIS W/O DYE: CPT | Performed by: RADIOLOGY

## 2024-07-01 RX ORDER — MORPHINE SULFATE 2 MG/ML
2 INJECTION, SOLUTION INTRAMUSCULAR; INTRAVENOUS ONCE
Status: COMPLETED | OUTPATIENT
Start: 2024-07-01 | End: 2024-07-01

## 2024-07-01 ASSESSMENT — LIFESTYLE VARIABLES
HAVE YOU EVER FELT YOU SHOULD CUT DOWN ON YOUR DRINKING: NO
TOTAL SCORE: 0
HAVE PEOPLE ANNOYED YOU BY CRITICIZING YOUR DRINKING: NO
EVER HAD A DRINK FIRST THING IN THE MORNING TO STEADY YOUR NERVES TO GET RID OF A HANGOVER: NO
EVER FELT BAD OR GUILTY ABOUT YOUR DRINKING: NO

## 2024-07-01 ASSESSMENT — PAIN DESCRIPTION - ONSET: ONSET: ONGOING

## 2024-07-01 ASSESSMENT — PAIN DESCRIPTION - DESCRIPTORS: DESCRIPTORS: ACHING

## 2024-07-01 ASSESSMENT — PAIN - FUNCTIONAL ASSESSMENT: PAIN_FUNCTIONAL_ASSESSMENT: 0-10

## 2024-07-01 ASSESSMENT — PAIN DESCRIPTION - LOCATION: LOCATION: HIP

## 2024-07-01 ASSESSMENT — PAIN DESCRIPTION - FREQUENCY: FREQUENCY: CONSTANT/CONTINUOUS

## 2024-07-01 ASSESSMENT — PAIN SCALES - GENERAL
PAINLEVEL_OUTOF10: 6
PAINLEVEL_OUTOF10: 8

## 2024-07-01 ASSESSMENT — PAIN DESCRIPTION - ORIENTATION: ORIENTATION: LEFT

## 2024-07-01 ASSESSMENT — PAIN DESCRIPTION - PAIN TYPE: TYPE: ACUTE PAIN

## 2024-07-01 ASSESSMENT — PAIN DESCRIPTION - PROGRESSION: CLINICAL_PROGRESSION: NOT CHANGED

## 2024-07-01 NOTE — PROGRESS NOTES
PROGRESS NOTE    Subjective   Chief complaint: Julissa Plasencia is a 89 y.o. female who is an assisted living facility patient being seen and evaluated for general medical care and monthly follow-up    HPI:  Patient seen and evaluated for general medical care and monthly follow-up.  Patient  at baseline mentation, calm, cooperative, pleasant. Engages in activities and meals with other residents.  Patient offers no complaints at time.  No concerns per nursing      Objective   Vital signs: 139/57-98.1-72-18-98%    Physical Exam  Constitutional:       Appearance: Normal appearance.   HENT:      Head: Normocephalic.      Mouth/Throat:      Mouth: Mucous membranes are moist.   Eyes:      Extraocular Movements: Extraocular movements intact.   Cardiovascular:      Rate and Rhythm: Normal rate and regular rhythm.      Pulses: Normal pulses.      Heart sounds: Normal heart sounds.   Pulmonary:      Effort: Pulmonary effort is normal.      Breath sounds: Normal breath sounds.   Abdominal:      General: Bowel sounds are normal.      Palpations: Abdomen is soft.   Musculoskeletal:         General: Normal range of motion.      Cervical back: Normal range of motion and neck supple.      Comments:  weakness   Skin:     General: Skin is warm and dry.      Capillary Refill: Capillary refill takes less than 2 seconds.   Neurological:      Mental Status: She is alert. Mental status is at baseline.   Psychiatric:         Mood and Affect: Mood normal.         Behavior: Behavior normal.         Assessment/Plan   Problem List Items Addressed This Visit       Hypertension, essential - Primary      Metoprolol   monitor           Occlusion and stenosis of left carotid artery      Stable   no dizziness, visual changes, new weakness, syncopal episode   control BP   monitor         Headaches due to old head injury      Stable   pain managed with Tylenol   monitor           Weakness      Reinforced calling for assistance when needed   rollator           Medications, treatments, and labs reviewed  Continue medications and treatments as listed in EMR    Eli Sanchez, APRN-CNP

## 2024-07-02 ENCOUNTER — APPOINTMENT (OUTPATIENT)
Dept: RADIOLOGY | Facility: HOSPITAL | Age: 89
End: 2024-07-02
Payer: MEDICARE

## 2024-07-02 LAB
ANION GAP SERPL CALC-SCNC: 13 MMOL/L (ref 10–20)
APPEARANCE UR: CLEAR
BACTERIA #/AREA URNS AUTO: ABNORMAL /HPF
BILIRUB UR STRIP.AUTO-MCNC: NEGATIVE MG/DL
BUN SERPL-MCNC: 7 MG/DL (ref 6–23)
CALCIUM SERPL-MCNC: 7.2 MG/DL (ref 8.6–10.3)
CHLORIDE SERPL-SCNC: 105 MMOL/L (ref 98–107)
CO2 SERPL-SCNC: 20 MMOL/L (ref 21–32)
COLOR UR: YELLOW
CREAT SERPL-MCNC: 0.24 MG/DL (ref 0.5–1.05)
EGFRCR SERPLBLD CKD-EPI 2021: >90 ML/MIN/1.73M*2
ERYTHROCYTE [DISTWIDTH] IN BLOOD BY AUTOMATED COUNT: 12.9 % (ref 11.5–14.5)
GLUCOSE SERPL-MCNC: 85 MG/DL (ref 74–99)
GLUCOSE UR STRIP.AUTO-MCNC: NORMAL MG/DL
HCT VFR BLD AUTO: 35.8 % (ref 36–46)
HGB BLD-MCNC: 12.1 G/DL (ref 12–16)
KETONES UR STRIP.AUTO-MCNC: ABNORMAL MG/DL
LEUKOCYTE ESTERASE UR QL STRIP.AUTO: ABNORMAL
MCH RBC QN AUTO: 31.7 PG (ref 26–34)
MCHC RBC AUTO-ENTMCNC: 33.8 G/DL (ref 32–36)
MCV RBC AUTO: 94 FL (ref 80–100)
NITRITE UR QL STRIP.AUTO: NEGATIVE
NRBC BLD-RTO: 0 /100 WBCS (ref 0–0)
PH UR STRIP.AUTO: 6.5 [PH]
PLATELET # BLD AUTO: 236 X10*3/UL (ref 150–450)
POTASSIUM SERPL-SCNC: 4.6 MMOL/L (ref 3.5–5.3)
PROT UR STRIP.AUTO-MCNC: ABNORMAL MG/DL
RBC # BLD AUTO: 3.82 X10*6/UL (ref 4–5.2)
RBC # UR STRIP.AUTO: ABNORMAL /UL
RBC #/AREA URNS AUTO: ABNORMAL /HPF
SODIUM SERPL-SCNC: 133 MMOL/L (ref 136–145)
SP GR UR STRIP.AUTO: 1.01
SQUAMOUS #/AREA URNS AUTO: ABNORMAL /HPF
UROBILINOGEN UR STRIP.AUTO-MCNC: ABNORMAL MG/DL
WBC # BLD AUTO: 14.2 X10*3/UL (ref 4.4–11.3)
WBC #/AREA URNS AUTO: ABNORMAL /HPF

## 2024-07-02 PROCEDURE — 2500000001 HC RX 250 WO HCPCS SELF ADMINISTERED DRUGS (ALT 637 FOR MEDICARE OP): Performed by: PHYSICIAN ASSISTANT

## 2024-07-02 PROCEDURE — 97162 PT EVAL MOD COMPLEX 30 MIN: CPT | Mod: GP

## 2024-07-02 PROCEDURE — 99223 1ST HOSP IP/OBS HIGH 75: CPT

## 2024-07-02 PROCEDURE — 2500000004 HC RX 250 GENERAL PHARMACY W/ HCPCS (ALT 636 FOR OP/ED)

## 2024-07-02 PROCEDURE — G0378 HOSPITAL OBSERVATION PER HR: HCPCS

## 2024-07-02 PROCEDURE — 97165 OT EVAL LOW COMPLEX 30 MIN: CPT | Mod: GO

## 2024-07-02 PROCEDURE — 36415 COLL VENOUS BLD VENIPUNCTURE: CPT

## 2024-07-02 PROCEDURE — 96376 TX/PRO/DX INJ SAME DRUG ADON: CPT | Performed by: INTERNAL MEDICINE

## 2024-07-02 PROCEDURE — 96372 THER/PROPH/DIAG INJ SC/IM: CPT

## 2024-07-02 PROCEDURE — 80048 BASIC METABOLIC PNL TOTAL CA: CPT

## 2024-07-02 PROCEDURE — 71045 X-RAY EXAM CHEST 1 VIEW: CPT | Performed by: RADIOLOGY

## 2024-07-02 PROCEDURE — 85027 COMPLETE CBC AUTOMATED: CPT

## 2024-07-02 PROCEDURE — 99222 1ST HOSP IP/OBS MODERATE 55: CPT | Performed by: INTERNAL MEDICINE

## 2024-07-02 PROCEDURE — 81001 URINALYSIS AUTO W/SCOPE: CPT | Performed by: INTERNAL MEDICINE

## 2024-07-02 PROCEDURE — 71045 X-RAY EXAM CHEST 1 VIEW: CPT

## 2024-07-02 PROCEDURE — 87086 URINE CULTURE/COLONY COUNT: CPT | Mod: PARLAB | Performed by: INTERNAL MEDICINE

## 2024-07-02 RX ORDER — ACETAMINOPHEN 500 MG
2000 TABLET ORAL DAILY
COMMUNITY

## 2024-07-02 RX ORDER — MORPHINE SULFATE 2 MG/ML
1 INJECTION, SOLUTION INTRAMUSCULAR; INTRAVENOUS EVERY 4 HOURS PRN
Status: DISCONTINUED | OUTPATIENT
Start: 2024-07-02 | End: 2024-07-03 | Stop reason: HOSPADM

## 2024-07-02 RX ORDER — HEPARIN SODIUM 5000 [USP'U]/ML
5000 INJECTION, SOLUTION INTRAVENOUS; SUBCUTANEOUS EVERY 8 HOURS
Status: DISCONTINUED | OUTPATIENT
Start: 2024-07-02 | End: 2024-07-03 | Stop reason: HOSPADM

## 2024-07-02 RX ORDER — ONDANSETRON HYDROCHLORIDE 2 MG/ML
4 INJECTION, SOLUTION INTRAVENOUS EVERY 8 HOURS PRN
Status: DISCONTINUED | OUTPATIENT
Start: 2024-07-02 | End: 2024-07-03 | Stop reason: HOSPADM

## 2024-07-02 RX ORDER — METOPROLOL SUCCINATE 50 MG/1
50 TABLET, EXTENDED RELEASE ORAL DAILY
Status: DISCONTINUED | OUTPATIENT
Start: 2024-07-02 | End: 2024-07-03 | Stop reason: HOSPADM

## 2024-07-02 RX ORDER — ACETAMINOPHEN 325 MG/1
1-2 TABLET ORAL EVERY 6 HOURS PRN
COMMUNITY

## 2024-07-02 RX ORDER — KETOROLAC TROMETHAMINE 30 MG/ML
15 INJECTION, SOLUTION INTRAMUSCULAR; INTRAVENOUS EVERY 6 HOURS PRN
Status: DISCONTINUED | OUTPATIENT
Start: 2024-07-02 | End: 2024-07-03 | Stop reason: HOSPADM

## 2024-07-02 RX ORDER — MORPHINE SULFATE 2 MG/ML
2 INJECTION, SOLUTION INTRAMUSCULAR; INTRAVENOUS EVERY 4 HOURS PRN
Status: DISCONTINUED | OUTPATIENT
Start: 2024-07-02 | End: 2024-07-03 | Stop reason: HOSPADM

## 2024-07-02 RX ORDER — ONDANSETRON 4 MG/1
4 TABLET, FILM COATED ORAL EVERY 8 HOURS PRN
Status: DISCONTINUED | OUTPATIENT
Start: 2024-07-02 | End: 2024-07-03 | Stop reason: HOSPADM

## 2024-07-02 RX ORDER — POLYETHYLENE GLYCOL 3350 17 G/17G
17 POWDER, FOR SOLUTION ORAL DAILY
Status: DISCONTINUED | OUTPATIENT
Start: 2024-07-02 | End: 2024-07-03 | Stop reason: HOSPADM

## 2024-07-02 SDOH — SOCIAL STABILITY: SOCIAL INSECURITY: HAVE YOU HAD THOUGHTS OF HARMING ANYONE ELSE?: UNABLE TO ASSESS

## 2024-07-02 SDOH — SOCIAL STABILITY: SOCIAL INSECURITY: HAVE YOU HAD ANY THOUGHTS OF HARMING ANYONE ELSE?: UNABLE TO ASSESS

## 2024-07-02 SDOH — SOCIAL STABILITY: SOCIAL INSECURITY: HAS ANYONE EVER THREATENED TO HURT YOUR FAMILY OR YOUR PETS?: UNABLE TO ASSESS

## 2024-07-02 SDOH — SOCIAL STABILITY: SOCIAL INSECURITY: DO YOU FEEL UNSAFE GOING BACK TO THE PLACE WHERE YOU ARE LIVING?: UNABLE TO ASSESS

## 2024-07-02 SDOH — SOCIAL STABILITY: SOCIAL INSECURITY: WERE YOU ABLE TO COMPLETE ALL THE BEHAVIORAL HEALTH SCREENINGS?: NO

## 2024-07-02 SDOH — SOCIAL STABILITY: SOCIAL INSECURITY: DOES ANYONE TRY TO KEEP YOU FROM HAVING/CONTACTING OTHER FRIENDS OR DOING THINGS OUTSIDE YOUR HOME?: UNABLE TO ASSESS

## 2024-07-02 SDOH — SOCIAL STABILITY: SOCIAL INSECURITY: DO YOU FEEL ANYONE HAS EXPLOITED OR TAKEN ADVANTAGE OF YOU FINANCIALLY OR OF YOUR PERSONAL PROPERTY?: UNABLE TO ASSESS

## 2024-07-02 SDOH — SOCIAL STABILITY: SOCIAL INSECURITY: ARE YOU OR HAVE YOU BEEN THREATENED OR ABUSED PHYSICALLY, EMOTIONALLY, OR SEXUALLY BY ANYONE?: UNABLE TO ASSESS

## 2024-07-02 SDOH — SOCIAL STABILITY: SOCIAL INSECURITY: ARE THERE ANY APPARENT SIGNS OF INJURIES/BEHAVIORS THAT COULD BE RELATED TO ABUSE/NEGLECT?: NO

## 2024-07-02 SDOH — SOCIAL STABILITY: SOCIAL INSECURITY: ABUSE: ADULT

## 2024-07-02 ASSESSMENT — LIFESTYLE VARIABLES
PRESCIPTION_ABUSE_PAST_12_MONTHS: NO
AUDIT-C TOTAL SCORE: -1
HOW MANY STANDARD DRINKS CONTAINING ALCOHOL DO YOU HAVE ON A TYPICAL DAY: PATIENT UNABLE TO ANSWER
HOW OFTEN DO YOU HAVE A DRINK CONTAINING ALCOHOL: PATIENT UNABLE TO ANSWER
SUBSTANCE_ABUSE_PAST_12_MONTHS: NO
AUDIT-C TOTAL SCORE: -1
SKIP TO QUESTIONS 9-10: 0
HOW OFTEN DO YOU HAVE 6 OR MORE DRINKS ON ONE OCCASION: PATIENT UNABLE TO ANSWER

## 2024-07-02 ASSESSMENT — COGNITIVE AND FUNCTIONAL STATUS - GENERAL
TOILETING: A LITTLE
PATIENT BASELINE BEDBOUND: NO
DAILY ACTIVITIY SCORE: 13
MOBILITY SCORE: 13
MOVING FROM LYING ON BACK TO SITTING ON SIDE OF FLAT BED WITH BEDRAILS: A LITTLE
MOVING TO AND FROM BED TO CHAIR: A LOT
DRESSING REGULAR LOWER BODY CLOTHING: A LOT
MOVING FROM LYING ON BACK TO SITTING ON SIDE OF FLAT BED WITH BEDRAILS: A LOT
HELP NEEDED FOR BATHING: A LOT
WALKING IN HOSPITAL ROOM: A LOT
STANDING UP FROM CHAIR USING ARMS: A LOT
DRESSING REGULAR LOWER BODY CLOTHING: TOTAL
PERSONAL GROOMING: A LITTLE
EATING MEALS: A LITTLE
TOILETING: TOTAL
DAILY ACTIVITIY SCORE: 17
HELP NEEDED FOR BATHING: A LITTLE
WALKING IN HOSPITAL ROOM: A LOT
DRESSING REGULAR UPPER BODY CLOTHING: A LITTLE
STANDING UP FROM CHAIR USING ARMS: A LOT
EATING MEALS: A LITTLE
DRESSING REGULAR UPPER BODY CLOTHING: A LITTLE
STANDING UP FROM CHAIR USING ARMS: A LOT
PERSONAL GROOMING: A LITTLE
DAILY ACTIVITIY SCORE: 13
MOVING TO AND FROM BED TO CHAIR: A LOT
MOBILITY SCORE: 12
DRESSING REGULAR UPPER BODY CLOTHING: A LITTLE
CLIMB 3 TO 5 STEPS WITH RAILING: A LOT
TOILETING: TOTAL
EATING MEALS: A LITTLE
TURNING FROM BACK TO SIDE WHILE IN FLAT BAD: A LOT
HELP NEEDED FOR BATHING: A LOT
MOVING FROM LYING ON BACK TO SITTING ON SIDE OF FLAT BED WITH BEDRAILS: A LOT
CLIMB 3 TO 5 STEPS WITH RAILING: A LOT
DRESSING REGULAR LOWER BODY CLOTHING: TOTAL
CLIMB 3 TO 5 STEPS WITH RAILING: A LOT
WALKING IN HOSPITAL ROOM: A LOT
MOVING TO AND FROM BED TO CHAIR: A LOT
TURNING FROM BACK TO SIDE WHILE IN FLAT BAD: A LOT
TURNING FROM BACK TO SIDE WHILE IN FLAT BAD: A LOT
MOBILITY SCORE: 12
PERSONAL GROOMING: A LITTLE

## 2024-07-02 ASSESSMENT — ACTIVITIES OF DAILY LIVING (ADL)
BATHING: NEEDS ASSISTANCE
HEARING - RIGHT EAR: DIFFICULTY WITH NOISE
GROOMING: NEEDS ASSISTANCE
ADLS_ADDRESSED: YES
JUDGMENT_ADEQUATE_SAFELY_COMPLETE_DAILY_ACTIVITIES: NO
PATIENT'S MEMORY ADEQUATE TO SAFELY COMPLETE DAILY ACTIVITIES?: NO
LACK_OF_TRANSPORTATION: NO
HEARING - LEFT EAR: DIFFICULTY WITH NOISE
TOILETING: NEEDS ASSISTANCE
BATHING_ASSISTANCE: MAXIMAL
ADEQUATE_TO_COMPLETE_ADL: YES
FEEDING YOURSELF: NEEDS ASSISTANCE
WALKS IN HOME: NEEDS ASSISTANCE
DRESSING YOURSELF: NEEDS ASSISTANCE
ADL_ASSISTANCE: NEEDS ASSISTANCE

## 2024-07-02 ASSESSMENT — PATIENT HEALTH QUESTIONNAIRE - PHQ9
SUM OF ALL RESPONSES TO PHQ9 QUESTIONS 1 & 2: 0
1. LITTLE INTEREST OR PLEASURE IN DOING THINGS: NOT AT ALL
2. FEELING DOWN, DEPRESSED OR HOPELESS: NOT AT ALL

## 2024-07-02 ASSESSMENT — PAIN SCALES - GENERAL
PAINLEVEL_OUTOF10: 8
PAINLEVEL_OUTOF10: 8
PAINLEVEL_OUTOF10: 4

## 2024-07-02 ASSESSMENT — PAIN - FUNCTIONAL ASSESSMENT
PAIN_FUNCTIONAL_ASSESSMENT: 0-10
PAIN_FUNCTIONAL_ASSESSMENT: 0-10

## 2024-07-02 ASSESSMENT — PAIN SCALES - PAIN ASSESSMENT IN ADVANCED DEMENTIA (PAINAD)
FACIALEXPRESSION: SMILING OR INEXPRESSIVE
CONSOLABILITY: NO NEED TO CONSOLE
BODYLANGUAGE: RELAXED
TOTALSCORE: 0
BREATHING: NORMAL

## 2024-07-02 NOTE — ED PROVIDER NOTES
HPI   Chief Complaint   Patient presents with    Hip Pain     Pt fell on Friday and has a known left hip fracture       Patient is nontoxic-appearing 89-year-old female with a past medical history of hypertension, confusion, vitamin D deficiency, headaches, weakness, fall, pelvic fracture, presents the emergency today for complaint of pelvic fracture.  Patient resides in nursing home where she had recently fallen.  X-rays have been obtained at that time that did confirm a left-sided pelvic fracture.  Patient was told by physician to go to emergency room for CAT scan of the pelvis.  Patient alert and oriented to baseline and does not recall entire event.  Nursing home staff were contacted that did confirm patient normally is ambulatory with a walker however states 3 days ago was not using this and fell causing the pelvic fracture.  Patient denies any headache pain, visual disturbances, numbness or tingling, numbness or tingling, neck pain, chest pain, shortness of breath difficulty breathing, palpitations, lightheadedness, dizziness, abdominal pain, nausea, vomit, diarrhea or constipation, fever, shaking, or chills.                          [place] Coma Scale Score: 13                     Patient History   Past Medical History:   Diagnosis Date    Abnormal uterine and vaginal bleeding, unspecified 12/28/2016    Abnormal vaginal bleeding    Biliary acute pancreatitis without necrosis or infection ([facility]) 02/02/2017    Acute biliary pancreatitis without infection or necrosis    Brain tumor (Multi)     Motion sickness, initial encounter 10/11/2016    Sea sickness    Other female genital prolapse 06/13/2017    Pelvic relaxation    Other specified cough 12/30/2014    Cough due to ACE inhibitor    Pain in right shoulder 06/01/2017    Acute pain of right shoulder    Personal history of other diseases of the digestive system 06/05/2017    History of acute pancreatitis    Personal history of other diseases of the digestive  system 03/06/2017    History of acute pancreatitis    Personal history of urinary (tract) infections 03/21/2017    History of urinary tract infection     Past Surgical History:   Procedure Laterality Date    COLONOSCOPY  08/12/2015    Complete Colonoscopy    ESOPHAGOGASTRODUODENOSCOPY  12/08/2015    Diagnostic Esophagogastroduodenoscopy     No family history on file.  Social History     Tobacco Use    Smoking status: Former     Types: Cigarettes    Smokeless tobacco: Never   Substance Use Topics    Alcohol use: Never    Drug use: Never       Physical Exam   ED Triage Vitals [07/01/24 2011]   Temperature Heart Rate Respirations BP   36.2 °C (97.2 °F) 88 18 (!) 175/96      Pulse Ox Temp Source Heart Rate Source Patient Position   97 % Temporal Monitor Lying      BP Location FiO2 (%)     Right arm --       Physical Exam  Vitals and nursing note reviewed. Exam conducted with a chaperone present.   Constitutional:       General: She is not in acute distress.     Appearance: Normal appearance. She is not ill-appearing, toxic-appearing or diaphoretic.   HENT:      Head: Normocephalic.      Nose: Nose normal.      Mouth/Throat:      Mouth: Mucous membranes are moist.   Eyes:      Extraocular Movements: Extraocular movements intact.      Pupils: Pupils are equal, round, and reactive to light.   Cardiovascular:      Rate and Rhythm: Normal rate and regular rhythm.      Pulses: Normal pulses.      Heart sounds: Normal heart sounds. No murmur heard.     No friction rub. No gallop.   Pulmonary:      Effort: Pulmonary effort is normal. No respiratory distress.      Breath sounds: Normal breath sounds. No stridor. No wheezing, rhonchi or rales.   Chest:      Chest wall: No tenderness.   Abdominal:      General: Abdomen is flat. There is no distension.      Palpations: Abdomen is soft. There is no mass.      Tenderness: There is no abdominal tenderness. There is no right CVA tenderness, left CVA tenderness, guarding or rebound.       Hernia: No hernia is present.   Musculoskeletal:         General: Tenderness and signs of injury present. No swelling or deformity. Normal range of motion.      Cervical back: Normal range of motion and neck supple.      Right lower leg: No edema.      Left lower leg: No edema.      Comments: Diffuse left-sided pelvic tenderness with light palpation, decreased range of motion due to discomfort, DP PT pulses strong and regular, cap refills less than 3 seconds   Skin:     General: Skin is warm and dry.      Capillary Refill: Capillary refill takes less than 2 seconds.      Coloration: Skin is not jaundiced or pale.      Findings: No bruising, erythema, lesion or rash.   Neurological:      Mental Status: She is alert.         ED Course & MDM   ED Course as of 07/01/24 2314 Mon Jul 01, 2024 2115 CT of the pelvis reveals  IMPRESSION:  Minimally or nondisplaced left inferior and superior pubic ramus  fractures.   [EC]      ED Course User Index  [EC] CROW Young-CNP         Diagnoses as of 07/01/24 2314   Other closed fracture of left pubis, initial encounter (Multi)       Medical Decision Making  Given patient's complaint presentation a thorough exam was performed.  On exam patient has Diffuse left-sided pelvic tenderness with light palpation, decreased range of motion due to discomfort, DP PT pulses strong and regular, cap refills less than 3 seconds, I have a low suspicion for vascular compromise.  CT of the pelvis was ordered.  CT of the pelvis as interpreted by radiologist reveals minimally or nondisplaced left inferior and superior pubic ramus fracture.  Attempt was made to ambulate patient however was unsuccessful due to discomfort.  Patient received morphine in the emergency room.  I contacted patient's primary care provider Dr. Alamo regards to admission for pelvic fracture and inability to ambulate.  Dr. Alamo has agreed admit patient to his service and will manage inpatient care.    Jose Holland,  APRN-CNP     Portions of this note were generated using digital voice recognition software, and may contain grammatical errors      Procedure  Procedures     Jose Holland, CROW-CNP  07/01/24 0114

## 2024-07-02 NOTE — PROGRESS NOTES
Pharmacy Medication History Review    Julissa Plasencia is a 89 y.o. female admitted for Other closed fracture of left pubis, initial encounter (Multi). Pharmacy reviewed the patient's [id number] medications and allergies for accuracy.    The list below reflectives the updated PTA list. Please review each medication in order reconciliation for additional clarification and justification.  Prior to Admission medications    Medication Sig Start Date End Date Taking? Authorizing Provider   acetaminophen (Tylenol) 325 mg tablet Take 1-2 tablets (325-650 mg) by mouth every 6 hours if needed for mild pain (1 - 3) or fever (temp greater than 38.0 C).    Historical Provider, MD   cholecalciferol (Vitamin D-3) 50 mcg (2,000 unit) capsule Take 1 capsule (50 mcg) by mouth early in the morning..    Historical Provider, MD   metoprolol succinate XL (Toprol-XL) 50 mg 24 hr tablet TAKE 1 TABLET BY MOUTH ONCE  DAILY 2/19/24   Hakan Bond, DO        The list below reflectives the updated allergy list. Please review each documented allergy for additional clarification and justification.  Allergies  Reviewed by Jose Mtz RN on 7/1/2024        Severity Reactions Comments    Lisinopril Low Cough             Below are additional concerns with the patient's PTA list.    Medication list from [facility].    Simran Garnica

## 2024-07-02 NOTE — PROGRESS NOTES
Occupational Therapy    Occupational Therapy    Evaluation    Patient Name: Julissa Plasencia  MRN: [id number]  Today's Date: 7/2/2024  Time Calculation  Start Time: 1038  Stop Time: 1054  Time Calculation (min): 16 min  AC18/AC18    Assessment  IP OT Assessment  OT Assessment: impaired adls  Prognosis: Good  Medical Staff Made Aware: Yes  End of Session Communication: Bedside nurse  End of Session Patient Position: On cart, Bed, 2 rail up, Alarm on (all needs met)    Plan:  Treatment Interventions: ADL retraining, Functional transfer training, Cognitive reorientation, Equipment evaluation/education, Patient/family training  OT Frequency: 3 times per week  OT Discharge Recommendations: Moderate intensity level of continued care  OT - OK to Discharge: Yes (once cleared by medical team)    Subjective     Current Problem:  1. Other closed fracture of left pubis, initial encounter (Multi)            General:  General  Reason for Referral: OT eval and treat-Left pubic rami fracture  Referred By: Freddy Adair PA-C  Past Medical History Relevant to Rehab: htn, confusion, vit. D deficiency, headache, weakness, brain tumor removal, brain ca.  Family/Caregiver Present: No  Co-Treatment: PT  Co-Treatment Reason: to maximize pt. safety and mobility  Prior to Session Communication: Bedside nurse  Patient Position Received: On cart, Alarm on  General Comment: pt. [place]    Precautions:  LE Weight Bearing Status: Weight Bearing as Tolerated  Medical Precautions: Fall precautions  Precautions Comment: left pubic rami fx    Pain:  Pain Assessment  0-10 (Numeric) Pain Score: 8 (lt. hip/thigh)    Objective     Cognition:  Overall Cognitive Status: Impaired (but able to follow 1 step commands/directions)  Orientation Level: Disoriented to place, Disoriented to time     Home Living:  Type of Home: Assisted living     Prior Function:  Level of [place]: Needs assistance with ADLs, Needs assistance with homemaking  Receives Help From: Personal  care attendant  ADL Assistance: Needs assistance  Ambulatory Assistance:  (pt. ind. ambulating with either rolling walker or rollator down to dining room for meals)  Hand Dominance: Right  Prior Function Comments: pt. states that she is ind. with dressing and toileting, has assistance for showering on seat in shower stall with grab bar and hhs.    IADL History:  Homemaking Responsibilities: No  IADL Comments: cleaning/meals/laundry provided by facility.    ADL:  Eating Assistance: Stand by  Grooming Assistance: Minimal  Bathing Assistance: Maximal  UE Dressing Assistance: Minimal  LE Dressing Assistance: Total  Toileting Assistance with Device: Total    Bed Mobility/Transfers:   Bed Mobility  Bed Mobility:  (max. assist of 2 supine<>sit at edge of cart)  Transfers  Transfer:  (sit to stand with min. assist of 2 with cues)    Ambulation/Gait Training:  Functional Mobility  Functional Mobility Performed:  (unable to take any steps at this time, unable to bear much weight through LLE due to pain)    Sensation:  Light Touch: No apparent deficits    Strength:  Strength Comments: bue arom wfl; arthritic changes in fingers. bilat. elbow and  strength 4-/5    Hand Function:  Hand Function  Coordination: Functional    Outcome Measures: [facility] Daily Activity  Putting on and taking off regular lower body clothing: Total  Bathing (including washing, rinsing, drying): A lot  Putting on and taking off regular upper body clothing: A little  Toileting, which includes using toilet, bedpan or urinal: Total  Taking care of personal grooming such as brushing teeth: A little  Eating Meals: A little  Daily Activity - Total Score: 13    EDUCATION:       Goals:   Encounter Problems       Encounter Problems (Active)       OT Goals       OT Goal 1 (Progressing)       Start:  07/02/24    Expected End:  07/16/24       Pt. will perform sponge bathing and dressing with min. assist using adaptive equipment as needed.           OT Goal 2  (Progressing)       Start:  07/02/24    Expected End:  07/16/24       Pt. will perform toileting with min. assist using adaptive equipment as needed.           OT Goal 3 (Progressing)       Start:  07/02/24    Expected End:  07/16/24       Pt. Will be ind. With feeding and grooming tasks.         OT Goal 4 (Progressing)       Start:  07/02/24    Expected End:  07/16/24       Pt. will perform bed mobility/chair/commode transfers with min. assist.           OT Goal 5 (Progressing)       Start:  07/02/24    Expected End:  07/16/24       Pt. will tolerate 6-8 min. of standing tasks with cga in preparation for grooming at sink.              OT Problem

## 2024-07-02 NOTE — H&P
"History Of Present Illness  Julissa Plasencia is a 89 y.o. female with a past medical history of hypertension, confusion, vitamin D deficiency, headaches, weakness, fall, pelvic fracture, presents the emergency today for complaint of pelvic fracture.  Patient resides in nursing home where she had recently fallen.  X-rays have been obtained at that time that did confirm a left-sided pelvic fracture.  Patient was told by physician to go to emergency room for CAT scan of the pelvis.  Patient does not recall entire circumstances of fall, but states that she will call for help from staff and they do not come in timely manner.  She states that she is weak and has difficulty with ambulation requiring a walker.  Currently denies pain at rest, endorses \"a lot \"of pain when attempting to move.  This history taken minutes after pain manage administered.    ED course: Patient slightly hypertensive on admission.  CT of pelvis showed minimally nondisplaced left inferior and superior pubic rami fracturesper, no acute fractures or dislocations of hips per radiologist.  Patient treated with morphine.     Past Medical History  Past Medical History:   Diagnosis Date    Abnormal uterine and vaginal bleeding, unspecified 12/28/2016    Abnormal vaginal bleeding    Biliary acute pancreatitis without necrosis or infection ([facility]) 02/02/2017    Acute biliary pancreatitis without infection or necrosis    Brain tumor (Multi)     Motion sickness, initial encounter 10/11/2016    Sea sickness    Other female genital prolapse 06/13/2017    Pelvic relaxation    Other specified cough 12/30/2014    Cough due to ACE inhibitor    Pain in right shoulder 06/01/2017    Acute pain of right shoulder    Personal history of other diseases of the digestive system 06/05/2017    History of acute pancreatitis    Personal history of other diseases of the digestive system 03/06/2017    History of acute pancreatitis    Personal history of urinary (tract) infections " 03/21/2017    History of urinary tract infection       Surgical History  Past Surgical History:   Procedure Laterality Date    COLONOSCOPY  08/12/2015    Complete Colonoscopy    ESOPHAGOGASTRODUODENOSCOPY  12/08/2015    Diagnostic Esophagogastroduodenoscopy        Social History  She reports that she has quit smoking. Her smoking use included cigarettes. She has never used smokeless tobacco. She reports that she does not drink alcohol and does not use drugs.    Family History  No family history on file.     Allergies  Lisinopril    Review of Systems  10 point ROS negative except as specified in HPI    Physical Exam  Constitutional:       Comments: Resting comfortably, thin appearing   HENT:      Head: Normocephalic and atraumatic.      Right Ear: External ear normal.      Left Ear: External ear normal.      Mouth/Throat:      Pharynx: Oropharynx is clear.   Eyes:      Conjunctiva/sclera: Conjunctivae normal.   Cardiovascular:      Rate and Rhythm: Normal rate.   Pulmonary:      Effort: Pulmonary effort is normal.      Breath sounds: Normal breath sounds.   Abdominal:      General: Abdomen is flat.      Palpations: Abdomen is soft.   Musculoskeletal:         General: Tenderness (Point tenderness left hip) present.   Skin:     General: Skin is warm and dry.   Neurological:      Mental Status: She is alert. Mental status is at baseline.   Psychiatric:         Behavior: Behavior normal.          Last Recorded Vitals  Blood pressure 153/63, pulse 94, temperature 36.2 °C (97.2 °F), temperature source Temporal, resp. rate 20, height 1.524 m (5'), weight 45.4 kg (100 lb), SpO2 97%.    Relevant Results    CT pelvis wo IV contrast    Result Date: 7/1/2024  Interpreted By:  Christy Webber, STUDY: CT PELVIS WO IV CONTRAST; ;  7/1/2024 8:23 pm   INDICATION: Signs/Symptoms:Previously known left ischium fracture as seen on x-ray.   COMPARISON: None.   ACCESSION NUMBER(S): [id number]   ORDERING CLINICIAN: AMANDA AMES   TECHNIQUE:  CT of the pelvis was performed. Sagittal and coronal reconstructions were generated. No intravenous contrast given for the exam.   FINDINGS: Nondisplaced fracture of the left superior pubic ramus. Minimally displaced fracture of the inferior pubic ramus with mildly displaced comminuted fragment laterally. Mild surrounding soft tissue thickening and stranding. No acute fracture or dislocation in either hip. Degenerative changes of both hips including small subchondral cysts in both femoral heads. Scoliosis and degenerative changes of the visualized lower lumbar spine. Diffuse osteopenia.   Questionable vague density layer in the partially visualized gallbladder. Faint peripheral uterine presumed vascular calcifications and coarsely calcified 0.4 cm probable fibroid in the left inferior aspect of the uterus.       Minimally or nondisplaced left inferior and superior pubic ramus fractures.   MACRO: None   Signed by: Christy Webber 7/1/2024 8:50 PM Dictation workstation:   [id number]        Assessment/Plan   Principal Problem:    Other closed fracture of left pubis, initial encounter (Multi)    89-year-old female with history of osteopenia and recent fall transferred to ED for further analysis of pelvic fracture.  CT confirmed pubic rami fractures.  Patient will be admitted to observation under Dr. Alamo for further evaluation and care.    #Closed fracture of left pubis  #Osteopenia  -Defer consult to attending  - Continue pain meds, Zofran as needed   - Weightbearing as tolerated  -PT/OT/social work  - Encourage high protein/calcium diet, adequate hydration    #DVT PPx  - Heparin  - SCD    Chronic conditions  #HTN: Vitals every 8 hours, low-salt diet       I spent 45 minutes in the professional and overall care of this patient.      Freddy Adair PA-C

## 2024-07-02 NOTE — PROGRESS NOTES
Physical Therapy    Physical Therapy    Physical Therapy Evaluation    Patient Name: Julissa Plasencia  MRN: [id number]  Today's Date: 7/2/2024   Time Calculation  Start Time: 1039  Stop Time: 1055  Time Calculation (min): 16 min  AC18/AC18    Assessment/Plan   PT Assessment  PT Assessment Results: Decreased strength, Decreased endurance, Decreased mobility, Impaired balance, Decreased cognition  Rehab Prognosis: Good  Evaluation/Treatment Tolerance: Patient limited by pain  Medical Staff Made Aware: Yes  End of Session Communication: Bedside nurse  Assessment Comment: Min to max assist for functional mobility  End of Session Patient Position: Bed, 2 rail up, On cart, Alarm on  IP OR SWING BED PT PLAN  Inpatient or Swing Bed: Inpatient  PT Plan  Treatment/Interventions: Bed mobility, Transfer training, Gait training, Balance training, Therapeutic exercise, Therapeutic activity, Strengthening  PT Plan: Ongoing PT  PT Frequency: 3 times per week  PT Discharge Recommendations: Moderate intensity level of continued care  PT Recommended Transfer Status: Assist x2  PT - OK to Discharge: Yes (To next level of care when cleared by medical team)    Subjective     Current Problem:  1. Other closed fracture of left pubis, initial encounter (Multi)          Patient Active Problem List   Diagnosis    Hypertension, essential    Confusion    Occlusion and stenosis of left carotid artery    Vitamin D deficiency    Headaches due to old head injury    Weakness    Acute pain of right knee    Fall    Rib pain    Other closed fracture of left pubis, initial encounter (Multi)       General Visit Information:  General  Reason for Referral: PT Eval and treat.  Left pubic rami fracture.  Referred By: Freddy Adair PA-C  Family/Caregiver Present: No  Co-Treatment: OT  Co-Treatment Reason: Maximize safety and mobility.  Prior to Session Communication: Bedside nurse  Patient Position Received: Bed, 2 rail up, On cart  General Comment: 89 year old  female admit with left pubic fracture of superior and inferior ramus with osteopenia.    Home Living:  Home Living  Type of Home: Skilled Nursing facility  Home Living Comments: Patient admit from senior living community    Prior Level of Function:  Prior Function Per Pt/Caregiver Report  Level of [place]: Needs assistance with ADLs, Needs assistance with homemaking, Needs assistance with functional transfers  Receives Help From: Primary caregiver  Prior Function Comments: Assume patient required assist for all ADL's and IADL's, amb with ww    Precautions:  Precautions  Hearing/Visual Limitations: wears glasses  LE Weight Bearing Status: Weight Bearing as Tolerated  Medical Precautions: Fall precautions  Precautions Comment: Pubic rami fractues left    Vital Signs:     Objective     Pain:  Pain Assessment  Pain Assessment: 0-10  0-10 (Numeric) Pain Score: 8  Pain Location: Hip  Pain Orientation: Left  Effect of Pain on Daily Activities: Difficulty transferring and ambulating  Pain Interventions: Repositioned    Cognition:  Cognition  Overall Cognitive Status: Impaired  Orientation Level: Disoriented to place, Disoriented to time, Disoriented to situation    General Assessments:  General Observation  General Observation: Patient appears uncomfortable and in pain with attempts to mobilize   Activity Tolerance  Endurance: Tolerates less than 10 min exercise, no significant change in vital signs  Activity Tolerance Comments: Notable increase in pain with bed mobiloty and sit to stand transfers  Sensation  Sensation Comment: responds to light touch  Strength  Strength Comments: Decreased strength left hip flexor     Coordination  Heel to Shin: Impaired  Postural Control  Posture Comment: increased thoracokyphosis with forward head posture  Static Sitting Balance  Static Sitting-Comment/Number of Minutes: fair  Dynamic Sitting Balance  Dynamic Sitting-Comments: fair  Static Standing Balance  Static  Standing-Comment/Number of Minutes: fair  Dynamic Standing Balance  Dynamic Standing-Comments: poor    Functional Assessments:  ADL  ADL's Addressed: Yes  ADL Comments: Assume assist with dressing, bathing , grooming,  Bed Mobility  Bed Mobility: Yes (Max assist x 2)  Transfers  Transfer: Yes (Max assist for supine to sit, min assist for sit to stand from tall cart.)  Ambulation/Gait Training  Ambulation/Gait Training Performed: No (Did stand at EOB but unable to advance stride to take a step.)                        Outcome Measures:     [facility] Basic Mobility  Turning from your back to your side while in a flat bed without using bedrails: A lot  Moving from lying on your back to sitting on the side of a flat bed without using bedrails: A lot  Moving to and from bed to chair (including a wheelchair): A lot  Standing up from a chair using your arms (e.g. wheelchair or bedside chair): A lot  To walk in hospital room: A lot  Climbing 3-5 steps with railing: A lot  Basic Mobility - Total Score: 12                                        Goals:  Encounter Problems       Encounter Problems (Active)       PT Problem       PT Goal 1 (Progressing)       Start:  07/02/24    Expected End:  07/09/24       STG - Pt will transition supine <> sitting with min assist x 1           PT Goal 2 (Progressing)       Start:  07/02/24    Expected End:  07/09/24       STG - Pt will transfer STS with min assist x 1          PT Goal 3 (Progressing)       Start:  07/02/24    Expected End:  07/09/24       STG - Pt will amb 50 ft' using ww  with min assist.               Education Documentation  Mobility Training, taught by Missy Ferreira, PT at 7/2/2024  1:30 PM.  Learner: Patient  Readiness: Acceptance  Method: Explanation  Response: Verbalizes Understanding    Education Comments  No comments found.

## 2024-07-02 NOTE — NURSING NOTE
Admission complete. Patient A&O x1. DNRCC-A [place] state form received from [facility] and placed in patient's ER chart

## 2024-07-03 VITALS
SYSTOLIC BLOOD PRESSURE: 171 MMHG | WEIGHT: 100 LBS | OXYGEN SATURATION: 96 % | BODY MASS INDEX: 19.63 KG/M2 | HEIGHT: 60 IN | TEMPERATURE: 98.8 F | RESPIRATION RATE: 18 BRPM | HEART RATE: 95 BPM | DIASTOLIC BLOOD PRESSURE: 74 MMHG

## 2024-07-03 LAB — HOLD SPECIMEN: NORMAL

## 2024-07-03 PROCEDURE — 2500000001 HC RX 250 WO HCPCS SELF ADMINISTERED DRUGS (ALT 637 FOR MEDICARE OP): Performed by: PHYSICIAN ASSISTANT

## 2024-07-03 PROCEDURE — 96372 THER/PROPH/DIAG INJ SC/IM: CPT

## 2024-07-03 PROCEDURE — 99238 HOSP IP/OBS DSCHRG MGMT 30/<: CPT | Performed by: INTERNAL MEDICINE

## 2024-07-03 PROCEDURE — 2500000004 HC RX 250 GENERAL PHARMACY W/ HCPCS (ALT 636 FOR OP/ED)

## 2024-07-03 PROCEDURE — G0378 HOSPITAL OBSERVATION PER HR: HCPCS

## 2024-07-03 RX ORDER — L. ACIDOPHILUS/L.BULGARICUS 1MM CELL
1 TABLET ORAL 2 TIMES DAILY
Status: DISCONTINUED | OUTPATIENT
Start: 2024-07-03 | End: 2024-07-03

## 2024-07-03 RX ORDER — CEFTRIAXONE 1 G/50ML
1 INJECTION, SOLUTION INTRAVENOUS EVERY 24 HOURS
Status: DISCONTINUED | OUTPATIENT
Start: 2024-07-03 | End: 2024-07-03

## 2024-07-03 RX ORDER — POLYETHYLENE GLYCOL 3350 17 G/17G
17 POWDER, FOR SOLUTION ORAL DAILY
Start: 2024-07-03

## 2024-07-03 ASSESSMENT — COGNITIVE AND FUNCTIONAL STATUS - GENERAL
DRESSING REGULAR LOWER BODY CLOTHING: TOTAL
CLIMB 3 TO 5 STEPS WITH RAILING: A LOT
STANDING UP FROM CHAIR USING ARMS: A LOT
DAILY ACTIVITIY SCORE: 13
WALKING IN HOSPITAL ROOM: A LOT
DRESSING REGULAR UPPER BODY CLOTHING: A LITTLE
MOVING FROM LYING ON BACK TO SITTING ON SIDE OF FLAT BED WITH BEDRAILS: A LOT
EATING MEALS: A LITTLE
TURNING FROM BACK TO SIDE WHILE IN FLAT BAD: A LOT
MOVING TO AND FROM BED TO CHAIR: A LOT
HELP NEEDED FOR BATHING: A LOT
TOILETING: TOTAL
PERSONAL GROOMING: A LITTLE
MOBILITY SCORE: 12

## 2024-07-03 ASSESSMENT — PAIN SCALES - GENERAL: PAINLEVEL_OUTOF10: 0 - NO PAIN

## 2024-07-03 NOTE — CONSULTS
ORTHOPAEDIC SURGERY CONSULT NOTE    Patient Name: Julissa Plasencia  MRN: [id number]  Admission Date: 7/1/2024  Date of Evaluation:  July 3, 2024  Time of Evaluation: 12:02 PM      Reason for Consult: Left hip pain  Requesting Physician: Dr. Alamo of Internal Medicine   Primary Care Physician: Andrew Alamo DO    IMPRESSION  1.  Left superior and inferior pubic rami fractures    PLAN  Admission status: Admitted to primary team.  Test(s)/Imaging: X-rays and CT scan reviewed.  Intervention: None.  PT/OT: WBAT LLE.  Diet: Per primary.   Pain Control: Per primary  DVT Prophylaxis: Per primary.  Disposition: No acute orthopedic intervention necessary at this time. Recommend symptomatic pain control. The expected course of healing was discussed with the patient. Patient will need to follow up in clinic in 2-3 weeks for repeat XR.     SUBJECTIVE  Ms. Plasencia is a 89 y.o. female with a history of hypertension, confusion, vitamin D deficiency, headaches, weakness, fall, pelvic fracture  who presents for left hip pain. Patient with a reported fall at nursing facility denies head trauma or LOC. Patient denies low back pain, numbness/tingling of the affected leg, or injury to other extremities.       Past Medical History:   Past Medical History:   Diagnosis Date    Abnormal uterine and vaginal bleeding, unspecified 12/28/2016    Abnormal vaginal bleeding    Biliary acute pancreatitis without necrosis or infection ([facility]) 02/02/2017    Acute biliary pancreatitis without infection or necrosis    Brain tumor (Multi)     Motion sickness, initial encounter 10/11/2016    Sea sickness    Other female genital prolapse 06/13/2017    Pelvic relaxation    Other specified cough 12/30/2014    Cough due to ACE inhibitor    Pain in right shoulder 06/01/2017    Acute pain of right shoulder    Personal history of other diseases of the digestive system 06/05/2017    History of acute pancreatitis    Personal history of other diseases of the  digestive system 03/06/2017    History of acute pancreatitis    Personal history of urinary (tract) infections 03/21/2017    History of urinary tract infection     Past Surgical History:   Past Surgical History:   Procedure Laterality Date    COLONOSCOPY  08/12/2015    Complete Colonoscopy    ESOPHAGOGASTRODUODENOSCOPY  12/08/2015    Diagnostic Esophagogastroduodenoscopy     Family History: No family history on file.  Social History:   Social History     Tobacco Use    Smoking status: Former     Types: Cigarettes    Smokeless tobacco: Never   Substance Use Topics    Alcohol use: Never    Drug use: Never     Medications:  Prior to Admission Medications:  Medications Prior to Admission   Medication Sig Dispense Refill Last Dose    acetaminophen (Tylenol) 325 mg tablet Take 1-2 tablets (325-650 mg) by mouth every 6 hours if needed for mild pain (1 - 3) or fever (temp greater than 38.0 C).   Unknown    cholecalciferol (Vitamin D-3) 50 mcg (2,000 unit) capsule Take 1 capsule (50 mcg) by mouth early in the morning..   Unknown    metoprolol succinate XL (Toprol-XL) 50 mg 24 hr tablet TAKE 1 TABLET BY MOUTH ONCE  DAILY 90 tablet 3 Unknown      Scheduled medications  heparin (porcine), 5,000 Units, subcutaneous, q8h  metoprolol succinate XL, 50 mg, oral, Daily  polyethylene glycol, 17 g, oral, Daily      Continuous medications     PRN medications  PRN medications: ketorolac, morphine **OR** morphine, ondansetron **OR** ondansetron      Current Allergies:   Allergies as of 07/01/2024 - Reviewed 07/01/2024   Allergen Reaction Noted    Lisinopril Cough 11/09/2023       Complete Review of Systems:    As above    OBJECTIVE  Physical Exam:   Patient Vitals for the past 24 hrs:   BP Temp Temp src Pulse Resp SpO2   07/03/24 0801 (!) 199/84 36.1 °C (97 °F) Temporal 86 18 97 %   07/03/24 0310 163/74 36.2 °C (97.2 °F) Temporal 68 18 94 %   07/02/24 1939 (!) 184/81 36.4 °C (97.5 °F) Temporal 87 18 96 %   07/02/24 1415 177/79 36.3 °C  (97.3 °F) Temporal 94 16 93 %   07/02/24 1338 (!) 188/76 -- -- 87 16 96 %   07/02/24 1208 170/77 -- -- 95 18 96 %     Body mass index is 19.53 kg/m².  GENERAL: NAD. A&Ox1-2. Cooperative. Pleasant.   Left hip MSK:  - Leg lengths grossly equal, no rash, ecchymosis, abrasions or lacerations noted over hip  - Wiggles toes; + EHL/TA/TP/GS function intact  - SILT L3-S1; 2+ DP pulse, BCR across all digits   - Compartments soft and compressible   - TTP over groin  Secondary skeletal exam   - No tenderness or obvious deformity to long bones or other major joints    DATA:   Diagnostic tests reviewed for today's visit:      Results for orders placed or performed during the hospital encounter of 07/01/24 (from the past 24 hour(s))   Urinalysis with Reflex Culture and Microscopic   Result Value Ref Range    Color, Urine Yellow Light-Yellow, Yellow, Dark-Yellow    Appearance, Urine Clear Clear    Specific Gravity, Urine 1.013 1.005 - 1.035    pH, Urine 6.5 5.0, 5.5, 6.0, 6.5, 7.0, 7.5, 8.0    Protein, Urine 20 (TRACE) NEGATIVE, 10 (TRACE), 20 (TRACE) mg/dL    Glucose, Urine Normal Normal mg/dL    Blood, Urine 0.2 (2+) (A) NEGATIVE    Ketones, Urine 10 (1+) (A) NEGATIVE mg/dL    Bilirubin, Urine NEGATIVE NEGATIVE    Urobilinogen, Urine 2 (1+) (A) Normal mg/dL    Nitrite, Urine NEGATIVE NEGATIVE    Leukocyte Esterase, Urine 250 Sonny/µL (A) NEGATIVE   Extra Urine Gray Tube   Result Value Ref Range    Extra Tube Hold for add-ons.    Microscopic Only, Urine   Result Value Ref Range    WBC, Urine 6-10 (A) 1-5, NONE /HPF    RBC, Urine 11-20 (A) NONE, 1-2, 3-5 /HPF    Squamous Epithelial Cells, Urine 1-9 (SPARSE) Reference range not established. /HPF    Bacteria, Urine 1+ (A) NONE SEEN /HPF           IMAGING TESTS:  CT pelvis wo IV contrast 07/01/2024    Narrative  Interpreted By:  Christy Webber,  STUDY:  CT PELVIS WO IV CONTRAST; ;  7/1/2024 8:23 pm    INDICATION:  Signs/Symptoms:Previously known left ischium fracture as seen  on  x-ray.    COMPARISON:  None.    ACCESSION NUMBER(S):  [id number]    ORDERING CLINICIAN:  AMANDA AMES    TECHNIQUE:  CT of the pelvis was performed. Sagittal and coronal reconstructions  were generated. No intravenous contrast given for the exam.    FINDINGS:  Nondisplaced fracture of the left superior pubic ramus. Minimally  displaced fracture of the inferior pubic ramus with mildly displaced  comminuted fragment laterally. Mild surrounding soft tissue  thickening and stranding. No acute fracture or dislocation in either  hip. Degenerative changes of both hips including small subchondral  cysts in both femoral heads. Scoliosis and degenerative changes of  the visualized lower lumbar spine. Diffuse osteopenia.    Questionable vague density layer in the partially visualized  gallbladder. Faint peripheral uterine presumed vascular  calcifications and coarsely calcified 0.4 cm probable fibroid in the  left inferior aspect of the uterus.    Impression  Minimally or nondisplaced left inferior and superior pubic ramus  fractures.    MACRO:  None    Signed by: Christy Webber 7/1/2024 8:50 PM  Dictation workstation:   [id number]        Thank you for the consult, I appreciate the opportunity to be involved in this patient's care.      Ayush Junior, DO  Orthopaedic Surgery  Sports Medicine & Shoulder  [facility]   [phone]

## 2024-07-03 NOTE — CARE PLAN
The patient's goals for the shift include      The clinical goals for the shift include Comfort and management

## 2024-07-03 NOTE — DISCHARGE SUMMARY
"Discharge Diagnosis  Other closed fracture of left pubis, initial encounter (Multi)    Issues Requiring Follow-Up  Pelvic fracture    Test Results Pending At Discharge  Pending Labs       Order Current Status    Urine Culture In process            Hospital Course   Julissa Plasencia is a 89 y.o. female with a past medical history of hypertension, confusion, vitamin D deficiency, headaches, weakness, fall, pelvic fracture, presents the emergency today for complaint of pelvic fracture.  Patient resides in nursing home where she had recently fallen.  X-rays have been obtained at that time that did confirm a left-sided pelvic fracture.  Patient was told by physician to go to emergency room for CAT scan of the pelvis.  Patient does not recall entire circumstances of fall, but states that she will call for help from staff and they do not come in timely manner.  She states that she is weak and has difficulty with ambulation requiring a walker.  Currently denies pain at rest, endorses \"a lot \"of pain when attempting to move.  This history taken minutes after pain manage administered.     ED course: Patient slightly hypertensive on admission.  CT of pelvis showed minimally nondisplaced left inferior and superior pubic rami fracturesper, no acute fractures or dislocations of hips per radiologist.  Patient treated with morphine.    [facility] to [facility] for PT/OT.    Pertinent Physical Exam At Time of Discharge  Physical Exam  Constitutional:       Comments: Resting comfortably, thin appearing   HENT:      Head: Normocephalic and atraumatic.      Right Ear: External ear normal.      Left Ear: External ear normal.      Mouth/Throat:      Pharynx: Oropharynx is clear.   Eyes:      Conjunctiva/sclera: Conjunctivae normal.   Cardiovascular:      Rate and Rhythm: Normal rate.   Pulmonary:      Effort: Pulmonary effort is normal.      Breath sounds: Normal breath sounds.   Abdominal:      General: Abdomen is flat.      Palpations: " Abdomen is soft.   Musculoskeletal:         General: Tenderness (Point tenderness left hip) present.   Skin:     General: Skin is warm and dry.   Neurological:      Mental Status: She is alert. Mental status is at baseline.   Psychiatric:         Behavior: Behavior normal.      Home Medications     Medication List      START taking these medications     polyethylene glycol 17 gram packet; Commonly known as: Glycolax,   Miralax; Take 17 g by mouth once daily.     CONTINUE taking these medications     acetaminophen 325 mg tablet; Commonly known as: Tylenol   cholecalciferol 50 mcg (2,000 unit) capsule; Commonly known as: Vitamin   D-3   metoprolol succinate XL 50 mg 24 hr tablet; Commonly known as:   Toprol-XL; TAKE 1 TABLET BY MOUTH ONCE  DAILY       Outpatient Follow-Up  No future appointments.    Andrew Alamo, DO

## 2024-07-03 NOTE — H&P
"History Of Present Illness  Julissa Plasencia is a 89 y.o. female with a past medical history of hypertension, confusion, vitamin D deficiency, headaches, weakness, fall, pelvic fracture, presents the emergency today for complaint of pelvic fracture.  Patient resides in nursing home where she had recently fallen.  X-rays have been obtained at that time that did confirm a left-sided pelvic fracture.  Patient was told by physician to go to emergency room for CAT scan of the pelvis.  Patient does not recall entire circumstances of fall, but states that she will call for help from staff and they do not come in timely manner.  She states that she is weak and has difficulty with ambulation requiring a walker.  Currently denies pain at rest, endorses \"a lot \"of pain when attempting to move.  This history taken minutes after pain manage administered.    ED course: Patient slightly hypertensive on admission.  CT of pelvis showed minimally nondisplaced left inferior and superior pubic rami fracturesper, no acute fractures or dislocations of hips per radiologist.  Patient treated with morphine.     Past Medical History  Past Medical History:   Diagnosis Date    Abnormal uterine and vaginal bleeding, unspecified 12/28/2016    Abnormal vaginal bleeding    Biliary acute pancreatitis without necrosis or infection ([facility]) 02/02/2017    Acute biliary pancreatitis without infection or necrosis    Brain tumor (Multi)     Motion sickness, initial encounter 10/11/2016    Sea sickness    Other female genital prolapse 06/13/2017    Pelvic relaxation    Other specified cough 12/30/2014    Cough due to ACE inhibitor    Pain in right shoulder 06/01/2017    Acute pain of right shoulder    Personal history of other diseases of the digestive system 06/05/2017    History of acute pancreatitis    Personal history of other diseases of the digestive system 03/06/2017    History of acute pancreatitis    Personal history of urinary (tract) infections " 03/21/2017    History of urinary tract infection       Surgical History  Past Surgical History:   Procedure Laterality Date    COLONOSCOPY  08/12/2015    Complete Colonoscopy    ESOPHAGOGASTRODUODENOSCOPY  12/08/2015    Diagnostic Esophagogastroduodenoscopy        Social History  She reports that she has quit smoking. Her smoking use included cigarettes. She has never used smokeless tobacco. She reports that she does not drink alcohol and does not use drugs.    Family History  No family history on file.     Allergies  Lisinopril    Review of Systems  10 point ROS negative except as specified in HPI    Physical Exam  Constitutional:       Comments: Resting comfortably, thin appearing   HENT:      Head: Normocephalic and atraumatic.      Right Ear: External ear normal.      Left Ear: External ear normal.      Mouth/Throat:      Pharynx: Oropharynx is clear.   Eyes:      Conjunctiva/sclera: Conjunctivae normal.   Cardiovascular:      Rate and Rhythm: Normal rate.   Pulmonary:      Effort: Pulmonary effort is normal.      Breath sounds: Normal breath sounds.   Abdominal:      General: Abdomen is flat.      Palpations: Abdomen is soft.   Musculoskeletal:         General: Tenderness (Point tenderness left hip) present.   Skin:     General: Skin is warm and dry.   Neurological:      Mental Status: She is alert. Mental status is at baseline.   Psychiatric:         Behavior: Behavior normal.          Last Recorded Vitals  Blood pressure (!) 184/81, pulse 87, temperature 36.4 °C (97.5 °F), temperature source Temporal, resp. rate 18, height 1.524 m (5'), weight 45.4 kg (100 lb), SpO2 96%.    Relevant Results    XR chest 1 view    Result Date: 7/2/2024  Interpreted By:  Julián Kirby, STUDY: XR CHEST 1 VIEW;  7/2/2024 10:35 am   INDICATION: Signs/Symptoms:leukocytosis.   COMPARISON: Chest radiograph 11/09/2023   ACCESSION NUMBER(S): [id number]   ORDERING CLINICIAN: ROBERT BYNUM   FINDINGS:     CARDIOMEDIASTINAL  SILHOUETTE: Cardiomediastinal silhouette is stable in size and configuration.   LUNGS: No consolidation, pneumothorax, or significant effusion.   ABDOMEN: No remarkable upper abdominal findings.   BONES: No acute osseous changes.       1.  No evidence of acute cardiopulmonary process.     Signed by: Julián Kirby 7/2/2024 10:54 AM Dictation workstation:   [id number]    CT pelvis wo IV contrast    Result Date: 7/1/2024  Interpreted By:  Christy Webber, STUDY: CT PELVIS WO IV CONTRAST; ;  7/1/2024 8:23 pm   INDICATION: Signs/Symptoms:Previously known left ischium fracture as seen on x-ray.   COMPARISON: None.   ACCESSION NUMBER(S): [id number]   ORDERING CLINICIAN: AMANDA AMES   TECHNIQUE: CT of the pelvis was performed. Sagittal and coronal reconstructions were generated. No intravenous contrast given for the exam.   FINDINGS: Nondisplaced fracture of the left superior pubic ramus. Minimally displaced fracture of the inferior pubic ramus with mildly displaced comminuted fragment laterally. Mild surrounding soft tissue thickening and stranding. No acute fracture or dislocation in either hip. Degenerative changes of both hips including small subchondral cysts in both femoral heads. Scoliosis and degenerative changes of the visualized lower lumbar spine. Diffuse osteopenia.   Questionable vague density layer in the partially visualized gallbladder. Faint peripheral uterine presumed vascular calcifications and coarsely calcified 0.4 cm probable fibroid in the left inferior aspect of the uterus.       Minimally or nondisplaced left inferior and superior pubic ramus fractures.   MACRO: None   Signed by: Christy Webber 7/1/2024 8:50 PM Dictation workstation:   [id number]        Assessment/Plan   Principal Problem:    Other closed fracture of left pubis, initial encounter (Multi)    89-year-old female with history of osteopenia and recent fall transferred to ED for further analysis of pelvic fracture.  CT confirmed pubic  rami fractures.  Patient will be admitted to observation under Dr. Alamo for further evaluation and care.    #Closed fracture of left pubis  #Osteopenia  -Defer consult to attending  - Continue pain meds, Zofran as needed   - Weightbearing as tolerated  -PT/OT/social work  - Encourage high protein/calcium diet, adequate hydration    #DVT PPx  - Heparin  - SCD    Chronic conditions  #HTN: Vitals every 8 hours, low-salt diet       I spent 45 minutes in the professional and overall care of this patient.      Andrew Alamo, DO

## 2024-07-04 LAB — BACTERIA UR CULT: ABNORMAL

## 2024-07-05 ENCOUNTER — NURSING HOME VISIT (OUTPATIENT)
Dept: POST ACUTE CARE | Facility: EXTERNAL LOCATION | Age: 89
End: 2024-07-05
Payer: MEDICARE

## 2024-07-05 DIAGNOSIS — R53.1 WEAKNESS: ICD-10-CM

## 2024-07-05 DIAGNOSIS — S32.592A: ICD-10-CM

## 2024-07-05 DIAGNOSIS — K59.01 SLOW TRANSIT CONSTIPATION: ICD-10-CM
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
## 2024-07-05 DIAGNOSIS — I10 HYPERTENSION, ESSENTIAL: Primary | ICD-10-CM

## 2024-07-05 DIAGNOSIS — I65.22 OCCLUSION AND STENOSIS OF LEFT CAROTID ARTERY: ICD-10-CM

## 2024-07-05 PROCEDURE — 99347 HOME/RES VST EST SF MDM 20: CPT

## 2024-07-05 NOTE — LETTER
Patient: Julissa Plasencia  : 3/1/1929    Encounter Date: 2024    PROGRESS NOTE    Subjective  Chief complaint: Julissa Plasencia is a 89 y.o. female who is an assisted living facility patient being seen and evaluated for readmission s/p IPA for pelvis fracture    HPI:  Patient seen and evaluated s/p IPA for pelvis fracture.  Patient at baseline mentation, cooperative.  Pain managed with current therapy.  BP within goal.  Reinforced calling for assistance when needed.  Bed alarms in place.  No other concerns per nursing      Objective  Vital signs:  124/64 - 97.9-80-16-93%    Physical Exam  Constitutional:       Appearance: Normal appearance.   HENT:      Head: Normocephalic.      Mouth/Throat:      Mouth: Mucous membranes are moist.   Eyes:      Extraocular Movements: Extraocular movements intact.   Cardiovascular:      Rate and Rhythm: Normal rate and regular rhythm.      Pulses: Normal pulses.      Heart sounds: Normal heart sounds.   Pulmonary:      Effort: Pulmonary effort is normal.      Breath sounds: Normal breath sounds.   Abdominal:      General: Bowel sounds are normal.      Palpations: Abdomen is soft.   Musculoskeletal:         General: Normal range of motion.      Cervical back: Normal range of motion and neck supple.      Comments:  weakness   Skin:     General: Skin is warm and dry.      Capillary Refill: Capillary refill takes less than 2 seconds.   Neurological:      Mental Status: She is alert. Mental status is at baseline.   Psychiatric:         Mood and Affect: Mood normal.         Behavior: Behavior normal.         Assessment/Plan  Problem List Items Addressed This Visit       Hypertension, essential - Primary      BP within goal   Continue Metoprolol   monitor           Occlusion and stenosis of left carotid artery      Stable   no dizziness, visual changes, new weakness, syncopal episode   control BP   monitor         Weakness      Reinforced calling for assistance when needed   two-person  assist for transfers         Fall      S/p fall  with pelvic fracture   bed alarms   maintain adequate lighting, proper footwear, clear pathways         Other closed fracture of left pubis, initial encounter (Multi)      Pain management   therapy         Slow transit constipation      Ensure adequate fluid intake   ClearLax          Medications, treatments, and labs reviewed  Continue medications and treatments as listed in EMR    MEETA Newton      Electronically Signed By: MEETA Newton   7/28/24 10:18 AM

## 2024-07-17 ENCOUNTER — NURSING HOME VISIT (OUTPATIENT)
Dept: POST ACUTE CARE | Facility: EXTERNAL LOCATION | Age: 89
End: 2024-07-17
Payer: MEDICARE

## 2024-07-17 DIAGNOSIS — I10 HYPERTENSION, ESSENTIAL: ICD-10-CM

## 2024-07-17 DIAGNOSIS — S32.592A: ICD-10-CM

## 2024-07-17 DIAGNOSIS — I65.22 OCCLUSION AND STENOSIS OF LEFT CAROTID ARTERY: ICD-10-CM

## 2024-07-17 DIAGNOSIS — K59.01 SLOW TRANSIT CONSTIPATION: Primary | ICD-10-CM

## 2024-07-17 DIAGNOSIS — F51.01 PRIMARY INSOMNIA: ICD-10-CM

## 2024-07-17 PROCEDURE — 99348 HOME/RES VST EST LOW MDM 30: CPT

## 2024-07-17 NOTE — LETTER
Patient: Julissa Plasencia  : 3/1/1929    Encounter Date: 2024    PROGRESS NOTE    Subjective  Chief complaint: Julissa Plasencia is a 89 y.o. female who is an assisted living facility patient being seen and evaluated for general medical care and monthly follow-up    HPI:  Patient seen and evaluated for general medical care and monthly follow-up.  Patient at baseline mentation, pleasant, cooperative.  Recent pelvic fracture - pain managed with current therapy.  Working with PT for mobility.  Patient complains of difficulty sleeping.  Sleep hygiene discussed.  BP within goal.  Appetite stable.  No concerns per nursing      Objective  Vital signs:  124/64 - 97.9-80-16-93%    Physical Exam  Constitutional:       Appearance: Normal appearance.   HENT:      Head: Normocephalic.      Mouth/Throat:      Mouth: Mucous membranes are moist.   Eyes:      Extraocular Movements: Extraocular movements intact.   Cardiovascular:      Rate and Rhythm: Normal rate and regular rhythm.      Pulses: Normal pulses.      Heart sounds: Normal heart sounds.   Pulmonary:      Effort: Pulmonary effort is normal.      Breath sounds: Normal breath sounds.   Abdominal:      General: Bowel sounds are normal.      Palpations: Abdomen is soft.   Musculoskeletal:         General: Normal range of motion.      Cervical back: Normal range of motion and neck supple.      Comments:  weakness   Skin:     General: Skin is warm and dry.      Capillary Refill: Capillary refill takes less than 2 seconds.   Neurological:      Mental Status: She is alert. Mental status is at baseline.   Psychiatric:         Mood and Affect: Mood normal.         Behavior: Behavior normal.         Assessment/Plan  Problem List Items Addressed This Visit       Hypertension, essential      BP within goal   Continue Metoprolol   monitor           Occlusion and stenosis of left carotid artery      Stable   no dizziness, visual changes, new weakness, syncopal episode   control BP    monitor         Other closed fracture of left pubis, initial encounter (Multi)      Pain management   therapy         Slow transit constipation - Primary      Ensure adequate fluid intake   ClearLax         Primary insomnia      sleep hygiene   melatonin and Tylenol PM          Medications, treatments, and labs reviewed  Continue medications and treatments as listed in EMR    MEETA Newton      Electronically Signed By: MEETA Newton   7/28/24 10:25 AM

## 2024-07-28 PROBLEM — F51.01 PRIMARY INSOMNIA: Status: ACTIVE | Noted: 2024-07-28

## 2024-07-28 PROBLEM — K59.01 SLOW TRANSIT CONSTIPATION: Status: ACTIVE | Noted: 2024-07-28

## 2024-07-28 NOTE — PROGRESS NOTES
PROGRESS NOTE    Subjective   Chief complaint: Julissa Plasencia is a 89 y.o. female who is an assisted living facility patient being seen and evaluated for readmission s/p IPA for pelvis fracture    HPI:  Patient seen and evaluated s/p IPA for pelvis fracture.  Patient at baseline mentation, cooperative.  Pain managed with current therapy.  BP within goal.  Reinforced calling for assistance when needed.  Bed alarms in place.  No other concerns per nursing      Objective   Vital signs:  124/64 - 97.9-80-16-93%    Physical Exam  Constitutional:       Appearance: Normal appearance.   HENT:      Head: Normocephalic.      Mouth/Throat:      Mouth: Mucous membranes are moist.   Eyes:      Extraocular Movements: Extraocular movements intact.   Cardiovascular:      Rate and Rhythm: Normal rate and regular rhythm.      Pulses: Normal pulses.      Heart sounds: Normal heart sounds.   Pulmonary:      Effort: Pulmonary effort is normal.      Breath sounds: Normal breath sounds.   Abdominal:      General: Bowel sounds are normal.      Palpations: Abdomen is soft.   Musculoskeletal:         General: Normal range of motion.      Cervical back: Normal range of motion and neck supple.      Comments:  weakness   Skin:     General: Skin is warm and dry.      Capillary Refill: Capillary refill takes less than 2 seconds.   Neurological:      Mental Status: She is alert. Mental status is at baseline.   Psychiatric:         Mood and Affect: Mood normal.         Behavior: Behavior normal.         Assessment/Plan   Problem List Items Addressed This Visit       Hypertension, essential - Primary      BP within goal   Continue Metoprolol   monitor           Occlusion and stenosis of left carotid artery      Stable   no dizziness, visual changes, new weakness, syncopal episode   control BP   monitor         Weakness      Reinforced calling for assistance when needed   two-person assist for transfers         Fall      S/p fall  with pelvic fracture    bed alarms   maintain adequate lighting, proper footwear, clear pathways         Other closed fracture of left pubis, initial encounter (Multi)      Pain management   therapy         Slow transit constipation      Ensure adequate fluid intake   ClearLax          Medications, treatments, and labs reviewed  Continue medications and treatments as listed in EMR    Eli Sanchez, APRN-CNP

## 2024-07-28 NOTE — PROGRESS NOTES
PROGRESS NOTE    Subjective   Chief complaint: Julissa Plasencia is a 89 y.o. female who is an assisted living facility patient being seen and evaluated for general medical care and monthly follow-up    HPI:  Patient seen and evaluated for general medical care and monthly follow-up.  Patient at baseline mentation, pleasant, cooperative.  Recent pelvic fracture - pain managed with current therapy.  Working with PT for mobility.  Patient complains of difficulty sleeping.  Sleep hygiene discussed.  BP within goal.  Appetite stable.  No concerns per nursing      Objective   Vital signs:  124/64 - 97.9-80-16-93%    Physical Exam  Constitutional:       Appearance: Normal appearance.   HENT:      Head: Normocephalic.      Mouth/Throat:      Mouth: Mucous membranes are moist.   Eyes:      Extraocular Movements: Extraocular movements intact.   Cardiovascular:      Rate and Rhythm: Normal rate and regular rhythm.      Pulses: Normal pulses.      Heart sounds: Normal heart sounds.   Pulmonary:      Effort: Pulmonary effort is normal.      Breath sounds: Normal breath sounds.   Abdominal:      General: Bowel sounds are normal.      Palpations: Abdomen is soft.   Musculoskeletal:         General: Normal range of motion.      Cervical back: Normal range of motion and neck supple.      Comments:  weakness   Skin:     General: Skin is warm and dry.      Capillary Refill: Capillary refill takes less than 2 seconds.   Neurological:      Mental Status: She is alert. Mental status is at baseline.   Psychiatric:         Mood and Affect: Mood normal.         Behavior: Behavior normal.         Assessment/Plan   Problem List Items Addressed This Visit       Hypertension, essential      BP within goal   Continue Metoprolol   monitor           Occlusion and stenosis of left carotid artery      Stable   no dizziness, visual changes, new weakness, syncopal episode   control BP   monitor         Other closed fracture of left pubis, initial encounter  (Multi)      Pain management   therapy         Slow transit constipation - Primary      Ensure adequate fluid intake   ClearLax         Primary insomnia      sleep hygiene   melatonin and Tylenol PM          Medications, treatments, and labs reviewed  Continue medications and treatments as listed in EMR    CROW Newton-CNP

## 2024-07-28 NOTE — ASSESSMENT & PLAN NOTE
S/p fall  with pelvic fracture   bed alarms   maintain adequate lighting, proper footwear, clear pathways

## 2024-08-14 ENCOUNTER — NURSING HOME VISIT (OUTPATIENT)
Dept: POST ACUTE CARE | Facility: EXTERNAL LOCATION | Age: 89
End: 2024-08-14
Payer: MEDICARE

## 2024-08-14 DIAGNOSIS — K59.01 SLOW TRANSIT CONSTIPATION: ICD-10-CM

## 2024-08-14 DIAGNOSIS — R41.0 CONFUSION: ICD-10-CM

## 2024-08-14 DIAGNOSIS — I10 HYPERTENSION, ESSENTIAL: ICD-10-CM

## 2024-08-14 DIAGNOSIS — S32.592A: Primary | ICD-10-CM

## 2024-08-14 PROCEDURE — 99348 HOME/RES VST EST LOW MDM 30: CPT

## 2024-08-14 NOTE — LETTER
Patient: Julissa Plasencia  : 3/1/1929    Encounter Date: 2024    PROGRESS NOTE    Subjective  Chief complaint: Jluissa Plasencia is a 89 y.o. female who is an assisted living facility patient being seen and evaluated for general medical care and monthly follow-up    HPI:  Patient seen and evaluated for general medical care and monthly follow-up.  Patient at baseline mentation, calm, cooperative, pleasant. No outbursts or agitation per nursing. Engages in activities and meals with other residents.  Patient offers no complaints at time.   Continues working with therapy s/p left pubis fracture.  No concerns per nursing      Objective  Vital signs:  141/72, 97.6, 74, 18, 95%    Physical Exam  Constitutional:       Appearance: Normal appearance.   HENT:      Head: Normocephalic.      Mouth/Throat:      Mouth: Mucous membranes are moist.   Eyes:      Extraocular Movements: Extraocular movements intact.   Cardiovascular:      Rate and Rhythm: Normal rate and regular rhythm.      Pulses: Normal pulses.      Heart sounds: Normal heart sounds.   Pulmonary:      Effort: Pulmonary effort is normal.      Breath sounds: Normal breath sounds.   Abdominal:      General: Bowel sounds are normal.      Palpations: Abdomen is soft.   Musculoskeletal:         General: Normal range of motion.      Cervical back: Normal range of motion and neck supple.      Comments:  weakness   Skin:     General: Skin is warm and dry.      Capillary Refill: Capillary refill takes less than 2 seconds.   Neurological:      Mental Status: She is alert. Mental status is at baseline.   Psychiatric:         Mood and Affect: Mood normal.         Behavior: Behavior normal.         Assessment/Plan  Problem List Items Addressed This Visit       Hypertension, essential      BP within goal   Continue Metoprolol   monitor           Confusion      intermittent confusion   ensure safety   reorient when needed   No aggressive/combative behaviors per nursing   monitor          Other closed fracture of left pubis, initial encounter (Multi) - Primary      Pain management   therapy         Slow transit constipation      Ensure adequate fluid intake   ClearLax          Medications, treatments, and labs reviewed  Continue medications and treatments as listed in EMR    MEETA Newton      Electronically Signed By: MEETA Newton   8/25/24 11:02 AM

## 2024-08-25 NOTE — PROGRESS NOTES
PROGRESS NOTE    Subjective   Chief complaint: Julissa Plasencia is a 89 y.o. female who is an assisted living facility patient being seen and evaluated for general medical care and monthly follow-up    HPI:  Patient seen and evaluated for general medical care and monthly follow-up.  Patient at baseline mentation, calm, cooperative, pleasant. No outbursts or agitation per nursing. Engages in activities and meals with other residents.  Patient offers no complaints at time.   Continues working with therapy s/p left pubis fracture.  No concerns per nursing      Objective   Vital signs:  141/72, 97.6, 74, 18, 95%    Physical Exam  Constitutional:       Appearance: Normal appearance.   HENT:      Head: Normocephalic.      Mouth/Throat:      Mouth: Mucous membranes are moist.   Eyes:      Extraocular Movements: Extraocular movements intact.   Cardiovascular:      Rate and Rhythm: Normal rate and regular rhythm.      Pulses: Normal pulses.      Heart sounds: Normal heart sounds.   Pulmonary:      Effort: Pulmonary effort is normal.      Breath sounds: Normal breath sounds.   Abdominal:      General: Bowel sounds are normal.      Palpations: Abdomen is soft.   Musculoskeletal:         General: Normal range of motion.      Cervical back: Normal range of motion and neck supple.      Comments:  weakness   Skin:     General: Skin is warm and dry.      Capillary Refill: Capillary refill takes less than 2 seconds.   Neurological:      Mental Status: She is alert. Mental status is at baseline.   Psychiatric:         Mood and Affect: Mood normal.         Behavior: Behavior normal.         Assessment/Plan   Problem List Items Addressed This Visit       Hypertension, essential      BP within goal   Continue Metoprolol   monitor           Confusion      intermittent confusion   ensure safety   reorient when needed   No aggressive/combative behaviors per nursing   monitor         Other closed fracture of left pubis, initial encounter (Multi)  - Primary      Pain management   therapy         Slow transit constipation      Ensure adequate fluid intake   ClearLax          Medications, treatments, and labs reviewed  Continue medications and treatments as listed in EMR    Eli Sanchez, APRN-CNP

## 2024-09-11 ENCOUNTER — NURSING HOME VISIT (OUTPATIENT)
Dept: POST ACUTE CARE | Facility: EXTERNAL LOCATION | Age: 89
End: 2024-09-11
Payer: MEDICARE

## 2024-09-11 DIAGNOSIS — F51.01 PRIMARY INSOMNIA: ICD-10-CM

## 2024-09-11 DIAGNOSIS — I10 HYPERTENSION, ESSENTIAL: ICD-10-CM

## 2024-09-11 DIAGNOSIS — K59.01 SLOW TRANSIT CONSTIPATION: ICD-10-CM

## 2024-09-11 DIAGNOSIS — S32.592A: Primary | ICD-10-CM

## 2024-09-11 PROCEDURE — 99348 HOME/RES VST EST LOW MDM 30: CPT

## 2024-09-11 NOTE — LETTER
Patient: Julissa Plasencia  : 3/1/1929    Encounter Date: 2024    PROGRESS NOTE    Subjective  Chief complaint: Julissa Plasencia is a 89 y.o. female who is an assisted living facility patient being seen and evaluated for general medical care and monthly follow-up    HPI:  Patient seen and evaluated for general medical care and monthly follow-up.  Patient at baseline mentation, calm, cooperative, pleasant. No outbursts or agitation per nursing. Engages in activities and meals with other residents.  Patient offers no complaints at time. Continues working with therapy for strengthening and mobility.  No concerns per nursing      Objective  Vital signs: .  129/78, 76, 16    Physical Exam  Constitutional:       Appearance: Normal appearance.   HENT:      Head: Normocephalic.      Mouth/Throat:      Mouth: Mucous membranes are moist.   Eyes:      Extraocular Movements: Extraocular movements intact.   Cardiovascular:      Rate and Rhythm: Normal rate and regular rhythm.      Pulses: Normal pulses.      Heart sounds: Normal heart sounds.   Pulmonary:      Effort: Pulmonary effort is normal.      Breath sounds: Normal breath sounds.   Abdominal:      General: Bowel sounds are normal.      Palpations: Abdomen is soft.   Musculoskeletal:         General: Normal range of motion.      Cervical back: Normal range of motion and neck supple.      Comments:  weakness   Skin:     General: Skin is warm and dry.      Capillary Refill: Capillary refill takes less than 2 seconds.   Neurological:      Mental Status: She is alert. Mental status is at baseline.   Psychiatric:         Mood and Affect: Mood normal.         Behavior: Behavior normal.         Assessment/Plan  Problem List Items Addressed This Visit       Hypertension, essential      BP within goal   Continue Metoprolol   monitor           Other closed fracture of left pubis, initial encounter (Multi) - Primary      Pain management   therapy         Slow transit constipation       Ensure adequate fluid intake   ClearLax         Primary insomnia      sleep hygiene   melatonin and Tylenol PM          Medications, treatments, and labs reviewed  Continue medications and treatments as listed in EMR    MEETA Newton      Electronically Signed By: MEETA Newton   9/12/24 12:24 AM

## 2024-09-12 NOTE — ASSESSMENT & PLAN NOTE
sleep hygiene   melatonin and Tylenol PM   Goal Outcome Evaluation:  Plan of Care Reviewed With: patient           Outcome Evaluation: OT eval completed. Pt presents alert and oriented x4, c/o nausea and weakness. He reports at baseline being independent with all adls and mobility, but over the past month has required increased assistance d/t continued dizziness and weakness. Today he demonstrates decreased strength, balance, activity tolerance, and ROM of L hand. He was able to bring self to sitting at EOB with CGA. Required Max A to don/doff socks d/t weakness. CGA for sit <> stand t/f from EOB and CGA/Min A for short distance in room mobility with HHAx2. He demonstrates limited ROM of digits of L hand and decreased strength, he reports this has been on going for the last several months. He would benefit from skilled OT services to address these deficits. Recommend d/c to SNF for continued skilled therapy services, pending pt progress.

## 2024-09-12 NOTE — PROGRESS NOTES
PROGRESS NOTE    Subjective   Chief complaint: Julissa Plasencia is a 89 y.o. female who is an assisted living facility patient being seen and evaluated for general medical care and monthly follow-up    HPI:  Patient seen and evaluated for general medical care and monthly follow-up.  Patient at baseline mentation, calm, cooperative, pleasant. No outbursts or agitation per nursing. Engages in activities and meals with other residents.  Patient offers no complaints at time. Continues working with therapy for strengthening and mobility.  No concerns per nursing      Objective   Vital signs: .  129/78, 76, 16    Physical Exam  Constitutional:       Appearance: Normal appearance.   HENT:      Head: Normocephalic.      Mouth/Throat:      Mouth: Mucous membranes are moist.   Eyes:      Extraocular Movements: Extraocular movements intact.   Cardiovascular:      Rate and Rhythm: Normal rate and regular rhythm.      Pulses: Normal pulses.      Heart sounds: Normal heart sounds.   Pulmonary:      Effort: Pulmonary effort is normal.      Breath sounds: Normal breath sounds.   Abdominal:      General: Bowel sounds are normal.      Palpations: Abdomen is soft.   Musculoskeletal:         General: Normal range of motion.      Cervical back: Normal range of motion and neck supple.      Comments:  weakness   Skin:     General: Skin is warm and dry.      Capillary Refill: Capillary refill takes less than 2 seconds.   Neurological:      Mental Status: She is alert. Mental status is at baseline.   Psychiatric:         Mood and Affect: Mood normal.         Behavior: Behavior normal.         Assessment/Plan   Problem List Items Addressed This Visit       Hypertension, essential      BP within goal   Continue Metoprolol   monitor           Other closed fracture of left pubis, initial encounter (Multi) - Primary      Pain management   therapy         Slow transit constipation      Ensure adequate fluid intake   ClearLax         Primary insomnia       sleep hygiene   melatonin and Tylenol PM          Medications, treatments, and labs reviewed  Continue medications and treatments as listed in EMR    Eli Sanchez, APRN-CNP

## 2024-10-18 ENCOUNTER — NURSING HOME VISIT (OUTPATIENT)
Dept: POST ACUTE CARE | Facility: EXTERNAL LOCATION | Age: 89
End: 2024-10-18
Payer: MEDICARE

## 2024-10-18 DIAGNOSIS — F51.01 PRIMARY INSOMNIA: ICD-10-CM

## 2024-10-18 DIAGNOSIS — R53.1 WEAKNESS: ICD-10-CM

## 2024-10-18 DIAGNOSIS — I10 HYPERTENSION, ESSENTIAL: Primary | ICD-10-CM

## 2024-10-18 DIAGNOSIS — K59.01 SLOW TRANSIT CONSTIPATION: ICD-10-CM

## 2024-10-18 PROCEDURE — 99348 HOME/RES VST EST LOW MDM 30: CPT

## 2024-10-18 NOTE — LETTER
Patient: Julissa Plasencia  : 3/1/1929    Encounter Date: 10/18/2024    PROGRESS NOTE    Subjective  Chief complaint: Julissa Plasencia is a 89 y.o. female who is an assisted living facility patient being seen and evaluated for  general medical care and monthly follow-up with    HPI:  Patient seen and evaluated for general medical care and monthly follow-up.  Patient at baseline mentation, pleasant, cooperative.  Working with PT for mobility. BP within goal.  Appetite stable.  No concerns per nursing      Objective  Vital signs:  132/51, 97.3, 79, 17, 97%    Physical Exam  Constitutional:       Appearance: Normal appearance.   HENT:      Head: Normocephalic.      Mouth/Throat:      Mouth: Mucous membranes are moist.   Eyes:      Extraocular Movements: Extraocular movements intact.   Cardiovascular:      Rate and Rhythm: Normal rate and regular rhythm.      Pulses: Normal pulses.      Heart sounds: Normal heart sounds.   Pulmonary:      Effort: Pulmonary effort is normal.      Breath sounds: Normal breath sounds.   Abdominal:      General: Bowel sounds are normal.      Palpations: Abdomen is soft.   Musculoskeletal:         General: Normal range of motion.      Cervical back: Normal range of motion and neck supple.      Comments:  weakness   Skin:     General: Skin is warm and dry.      Capillary Refill: Capillary refill takes less than 2 seconds.   Neurological:      Mental Status: She is alert. Mental status is at baseline.   Psychiatric:         Mood and Affect: Mood normal.         Behavior: Behavior normal.         Assessment/Plan  Problem List Items Addressed This Visit       Hypertension, essential - Primary      BP within goal   Continue Metoprolol   monitor           Weakness      Reinforced calling for assistance when needed   two-person assist for transfers         Slow transit constipation      Ensure adequate fluid intake   ClearLax         Primary insomnia      sleep hygiene   melatonin and Tylenol PM           Medications, treatments, and labs reviewed  Continue medications and treatments as listed in EMR    MEETA Newton      Electronically Signed By: MEETA Newton   10/20/24  9:46 PM

## 2024-10-21 NOTE — PROGRESS NOTES
PROGRESS NOTE    Subjective   Chief complaint: Julissa Plasencia is a 89 y.o. female who is an assisted living facility patient being seen and evaluated for  general medical care and monthly follow-up with    HPI:  Patient seen and evaluated for general medical care and monthly follow-up.  Patient at baseline mentation, pleasant, cooperative.  Working with PT for mobility. BP within goal.  Appetite stable.  No concerns per nursing      Objective   Vital signs:  132/51, 97.3, 79, 17, 97%    Physical Exam  Constitutional:       Appearance: Normal appearance.   HENT:      Head: Normocephalic.      Mouth/Throat:      Mouth: Mucous membranes are moist.   Eyes:      Extraocular Movements: Extraocular movements intact.   Cardiovascular:      Rate and Rhythm: Normal rate and regular rhythm.      Pulses: Normal pulses.      Heart sounds: Normal heart sounds.   Pulmonary:      Effort: Pulmonary effort is normal.      Breath sounds: Normal breath sounds.   Abdominal:      General: Bowel sounds are normal.      Palpations: Abdomen is soft.   Musculoskeletal:         General: Normal range of motion.      Cervical back: Normal range of motion and neck supple.      Comments:  weakness   Skin:     General: Skin is warm and dry.      Capillary Refill: Capillary refill takes less than 2 seconds.   Neurological:      Mental Status: She is alert. Mental status is at baseline.   Psychiatric:         Mood and Affect: Mood normal.         Behavior: Behavior normal.         Assessment/Plan   Problem List Items Addressed This Visit       Hypertension, essential - Primary      BP within goal   Continue Metoprolol   monitor           Weakness      Reinforced calling for assistance when needed   two-person assist for transfers         Slow transit constipation      Ensure adequate fluid intake   ClearLax         Primary insomnia      sleep hygiene   melatonin and Tylenol PM          Medications, treatments, and labs reviewed  Continue medications  and treatments as listed in EMR    Eli Sanchez, APRN-CNP

## 2024-11-08 ENCOUNTER — NURSING HOME VISIT (OUTPATIENT)
Dept: POST ACUTE CARE | Facility: EXTERNAL LOCATION | Age: 89
End: 2024-11-08
Payer: MEDICARE

## 2024-11-08 DIAGNOSIS — K59.01 SLOW TRANSIT CONSTIPATION: ICD-10-CM

## 2024-11-08 DIAGNOSIS — F51.01 PRIMARY INSOMNIA: ICD-10-CM

## 2024-11-08 DIAGNOSIS — R53.1 WEAKNESS: ICD-10-CM

## 2024-11-08 DIAGNOSIS — I10 HYPERTENSION, ESSENTIAL: Primary | ICD-10-CM

## 2024-11-08 PROCEDURE — 99347 HOME/RES VST EST SF MDM 20: CPT

## 2024-11-08 NOTE — LETTER
Patient: Julissa Plasencia  : 3/1/1929    Encounter Date: 2024    PROGRESS NOTE    Subjective  Chief complaint: Julissa Plasencia is a 89 y.o. female who is an assisted living facility patient being seen and evaluated for general medical care and monthly follow up    HPI:  Patient seen and evaluated for general medical care and monthly follow-up.  Patient at baseline mentation, calm, cooperative, pleasant. No outbursts or agitation per nursing. Engages in activities and meals with other residents.  Patient offers no complaints at time. Continues working with therapy for strengthening and mobility.  No concerns per nursing      Objective  Vital signs:  142/64, 96.5, 94, 16, 98%    Physical Exam  Constitutional:       Appearance: Normal appearance.   HENT:      Head: Normocephalic.      Mouth/Throat:      Mouth: Mucous membranes are moist.   Eyes:      Extraocular Movements: Extraocular movements intact.   Cardiovascular:      Rate and Rhythm: Normal rate and regular rhythm.      Pulses: Normal pulses.      Heart sounds: Normal heart sounds.   Pulmonary:      Effort: Pulmonary effort is normal.      Breath sounds: Normal breath sounds.   Abdominal:      General: Bowel sounds are normal.      Palpations: Abdomen is soft.   Musculoskeletal:         General: Normal range of motion.      Cervical back: Normal range of motion and neck supple.      Comments:  weakness   Skin:     General: Skin is warm and dry.      Capillary Refill: Capillary refill takes less than 2 seconds.   Neurological:      Mental Status: She is alert. Mental status is at baseline.   Psychiatric:         Mood and Affect: Mood normal.         Behavior: Behavior normal.         Assessment/Plan  Problem List Items Addressed This Visit       Hypertension, essential - Primary      stable   Continue Metoprolol   monitor           Weakness      Reinforced calling for assistance when needed   two-person assist for transfers   therapy         Slow transit  constipation      Ensure adequate fluid intake   ClearLax         Primary insomnia      sleep hygiene   melatonin and Tylenol PM          Medications, treatments, and labs reviewed  Continue medications and treatments as listed in EMR    MEETA Newton      Electronically Signed By: MEETA Newton   11/10/24 10:02 AM

## 2024-11-10 NOTE — PROGRESS NOTES
PROGRESS NOTE    Subjective   Chief complaint: Julissa Plasencia is a 89 y.o. female who is an assisted living facility patient being seen and evaluated for general medical care and monthly follow up    HPI:  Patient seen and evaluated for general medical care and monthly follow-up.  Patient at baseline mentation, calm, cooperative, pleasant. No outbursts or agitation per nursing. Engages in activities and meals with other residents.  Patient offers no complaints at time. Continues working with therapy for strengthening and mobility.  No concerns per nursing      Objective   Vital signs:  142/64, 96.5, 94, 16, 98%    Physical Exam  Constitutional:       Appearance: Normal appearance.   HENT:      Head: Normocephalic.      Mouth/Throat:      Mouth: Mucous membranes are moist.   Eyes:      Extraocular Movements: Extraocular movements intact.   Cardiovascular:      Rate and Rhythm: Normal rate and regular rhythm.      Pulses: Normal pulses.      Heart sounds: Normal heart sounds.   Pulmonary:      Effort: Pulmonary effort is normal.      Breath sounds: Normal breath sounds.   Abdominal:      General: Bowel sounds are normal.      Palpations: Abdomen is soft.   Musculoskeletal:         General: Normal range of motion.      Cervical back: Normal range of motion and neck supple.      Comments:  weakness   Skin:     General: Skin is warm and dry.      Capillary Refill: Capillary refill takes less than 2 seconds.   Neurological:      Mental Status: She is alert. Mental status is at baseline.   Psychiatric:         Mood and Affect: Mood normal.         Behavior: Behavior normal.         Assessment/Plan   Problem List Items Addressed This Visit       Hypertension, essential - Primary      stable   Continue Metoprolol   monitor           Weakness      Reinforced calling for assistance when needed   two-person assist for transfers   therapy         Slow transit constipation      Ensure adequate fluid intake   ClearLax         Primary  insomnia      sleep hygiene   melatonin and Tylenol PM          Medications, treatments, and labs reviewed  Continue medications and treatments as listed in EMR    Eli Sanchez, APRN-CNP

## 2024-12-11 ENCOUNTER — ANESTHESIA EVENT (OUTPATIENT)
Dept: OPERATING ROOM | Facility: HOSPITAL | Age: 89
End: 2024-12-11
Payer: MEDICARE

## 2024-12-11 ENCOUNTER — APPOINTMENT (OUTPATIENT)
Dept: RADIOLOGY | Facility: HOSPITAL | Age: 89
DRG: 481 | End: 2024-12-11
Payer: MEDICARE

## 2024-12-11 ENCOUNTER — HOSPITAL ENCOUNTER (INPATIENT)
Facility: HOSPITAL | Age: 89
LOS: 2 days | Discharge: HOME | DRG: 481 | End: 2024-12-13
Attending: EMERGENCY MEDICINE | Admitting: INTERNAL MEDICINE
Payer: MEDICARE

## 2024-12-11 ENCOUNTER — APPOINTMENT (OUTPATIENT)
Dept: CARDIOLOGY | Facility: HOSPITAL | Age: 89
DRG: 481 | End: 2024-12-11
Payer: MEDICARE

## 2024-12-11 ENCOUNTER — ANESTHESIA (OUTPATIENT)
Dept: OPERATING ROOM | Facility: HOSPITAL | Age: 89
End: 2024-12-11
Payer: MEDICARE

## 2024-12-11 DIAGNOSIS — D72.829 LEUKOCYTOSIS, UNSPECIFIED TYPE: ICD-10-CM

## 2024-12-11 DIAGNOSIS — E87.1 HYPONATREMIA: ICD-10-CM

## 2024-12-11 DIAGNOSIS — S72.002A CLOSED FRACTURE OF LEFT HIP, INITIAL ENCOUNTER: Primary | ICD-10-CM

## 2024-12-11 LAB
ABO GROUP (TYPE) IN BLOOD: NORMAL
ABO GROUP (TYPE) IN BLOOD: NORMAL
ALBUMIN SERPL BCP-MCNC: 4.3 G/DL (ref 3.4–5)
ALP SERPL-CCNC: 132 U/L (ref 33–136)
ALT SERPL W P-5'-P-CCNC: 11 U/L (ref 7–45)
ANION GAP SERPL CALC-SCNC: 12 MMOL/L (ref 10–20)
ANTIBODY SCREEN: NORMAL
APPEARANCE UR: CLEAR
APTT PPP: 32 SECONDS (ref 27–38)
AST SERPL W P-5'-P-CCNC: 16 U/L (ref 9–39)
BACTERIA #/AREA URNS AUTO: ABNORMAL /HPF
BASOPHILS # BLD AUTO: 0.06 X10*3/UL (ref 0–0.1)
BASOPHILS NFR BLD AUTO: 0.3 %
BILIRUB SERPL-MCNC: 0.5 MG/DL (ref 0–1.2)
BILIRUB UR STRIP.AUTO-MCNC: NEGATIVE MG/DL
BUN SERPL-MCNC: 11 MG/DL (ref 6–23)
CALCIUM SERPL-MCNC: 9.8 MG/DL (ref 8.6–10.3)
CARDIAC TROPONIN I PNL SERPL HS: 7 NG/L (ref 0–13)
CARDIAC TROPONIN I PNL SERPL HS: 8 NG/L (ref 0–13)
CHLORIDE SERPL-SCNC: 94 MMOL/L (ref 98–107)
CK SERPL-CCNC: 38 U/L (ref 0–215)
CO2 SERPL-SCNC: 30 MMOL/L (ref 21–32)
COLOR UR: ABNORMAL
CREAT SERPL-MCNC: 0.52 MG/DL (ref 0.5–1.05)
EGFRCR SERPLBLD CKD-EPI 2021: 86 ML/MIN/1.73M*2
EOSINOPHIL # BLD AUTO: 0.04 X10*3/UL (ref 0–0.4)
EOSINOPHIL NFR BLD AUTO: 0.2 %
ERYTHROCYTE [DISTWIDTH] IN BLOOD BY AUTOMATED COUNT: 13.2 % (ref 11.5–14.5)
GLUCOSE SERPL-MCNC: 105 MG/DL (ref 74–99)
GLUCOSE UR STRIP.AUTO-MCNC: NORMAL MG/DL
HCT VFR BLD AUTO: 37.6 % (ref 36–46)
HGB BLD-MCNC: 12.7 G/DL (ref 12–16)
HOLD SPECIMEN: NORMAL
IMM GRANULOCYTES # BLD AUTO: 0.15 X10*3/UL (ref 0–0.5)
IMM GRANULOCYTES NFR BLD AUTO: 0.7 % (ref 0–0.9)
INR PPP: 1 (ref 0.9–1.1)
KETONES UR STRIP.AUTO-MCNC: NEGATIVE MG/DL
LEUKOCYTE ESTERASE UR QL STRIP.AUTO: NEGATIVE
LYMPHOCYTES # BLD AUTO: 1.48 X10*3/UL (ref 0.8–3)
LYMPHOCYTES NFR BLD AUTO: 7.2 %
MCH RBC QN AUTO: 31.5 PG (ref 26–34)
MCHC RBC AUTO-ENTMCNC: 33.8 G/DL (ref 32–36)
MCV RBC AUTO: 93 FL (ref 80–100)
MONOCYTES # BLD AUTO: 1.14 X10*3/UL (ref 0.05–0.8)
MONOCYTES NFR BLD AUTO: 5.5 %
NEUTROPHILS # BLD AUTO: 17.81 X10*3/UL (ref 1.6–5.5)
NEUTROPHILS NFR BLD AUTO: 86.1 %
NITRITE UR QL STRIP.AUTO: NEGATIVE
NRBC BLD-RTO: 0 /100 WBCS (ref 0–0)
PH UR STRIP.AUTO: 6.5 [PH]
PLATELET # BLD AUTO: 296 X10*3/UL (ref 150–450)
POTASSIUM SERPL-SCNC: 4.1 MMOL/L (ref 3.5–5.3)
PROT SERPL-MCNC: 7.5 G/DL (ref 6.4–8.2)
PROT UR STRIP.AUTO-MCNC: NEGATIVE MG/DL
PROTHROMBIN TIME: 11.8 SECONDS (ref 9.8–12.8)
RBC # BLD AUTO: 4.03 X10*6/UL (ref 4–5.2)
RBC # UR STRIP.AUTO: ABNORMAL /UL
RBC #/AREA URNS AUTO: ABNORMAL /HPF
RH FACTOR (ANTIGEN D): NORMAL
RH FACTOR (ANTIGEN D): NORMAL
SARS-COV-2 RNA RESP QL NAA+PROBE: NOT DETECTED
SODIUM SERPL-SCNC: 132 MMOL/L (ref 136–145)
SP GR UR STRIP.AUTO: 1.01
UROBILINOGEN UR STRIP.AUTO-MCNC: NORMAL MG/DL
WBC # BLD AUTO: 20.7 X10*3/UL (ref 4.4–11.3)
WBC #/AREA URNS AUTO: ABNORMAL /HPF

## 2024-12-11 PROCEDURE — 2500000004 HC RX 250 GENERAL PHARMACY W/ HCPCS (ALT 636 FOR OP/ED): Performed by: ANESTHESIOLOGIST ASSISTANT

## 2024-12-11 PROCEDURE — 0QS706Z REPOSITION LEFT UPPER FEMUR WITH INTRAMEDULLARY INTERNAL FIXATION DEVICE, OPEN APPROACH: ICD-10-PCS | Performed by: STUDENT IN AN ORGANIZED HEALTH CARE EDUCATION/TRAINING PROGRAM

## 2024-12-11 PROCEDURE — 80053 COMPREHEN METABOLIC PANEL: CPT | Performed by: EMERGENCY MEDICINE

## 2024-12-11 PROCEDURE — 3600000009 HC OR TIME - EACH INCREMENTAL 1 MINUTE - PROCEDURE LEVEL FOUR: Performed by: STUDENT IN AN ORGANIZED HEALTH CARE EDUCATION/TRAINING PROGRAM

## 2024-12-11 PROCEDURE — 3600000004 HC OR TIME - INITIAL BASE CHARGE - PROCEDURE LEVEL FOUR: Performed by: STUDENT IN AN ORGANIZED HEALTH CARE EDUCATION/TRAINING PROGRAM

## 2024-12-11 PROCEDURE — 87086 URINE CULTURE/COLONY COUNT: CPT | Mod: PARLAB | Performed by: NURSE PRACTITIONER

## 2024-12-11 PROCEDURE — 2550000001 HC RX 255 CONTRASTS: Performed by: INTERNAL MEDICINE

## 2024-12-11 PROCEDURE — 36415 COLL VENOUS BLD VENIPUNCTURE: CPT | Performed by: EMERGENCY MEDICINE

## 2024-12-11 PROCEDURE — 86900 BLOOD TYPING SEROLOGIC ABO: CPT | Performed by: EMERGENCY MEDICINE

## 2024-12-11 PROCEDURE — 1210000001 HC SEMI-PRIVATE ROOM DAILY

## 2024-12-11 PROCEDURE — 2500000004 HC RX 250 GENERAL PHARMACY W/ HCPCS (ALT 636 FOR OP/ED): Mod: JZ | Performed by: STUDENT IN AN ORGANIZED HEALTH CARE EDUCATION/TRAINING PROGRAM

## 2024-12-11 PROCEDURE — 2720000007 HC OR 272 NO HCPCS: Performed by: STUDENT IN AN ORGANIZED HEALTH CARE EDUCATION/TRAINING PROGRAM

## 2024-12-11 PROCEDURE — 72125 CT NECK SPINE W/O DYE: CPT | Performed by: RADIOLOGY

## 2024-12-11 PROCEDURE — 71260 CT THORAX DX C+: CPT

## 2024-12-11 PROCEDURE — 72125 CT NECK SPINE W/O DYE: CPT

## 2024-12-11 PROCEDURE — C1713 ANCHOR/SCREW BN/BN,TIS/BN: HCPCS | Performed by: STUDENT IN AN ORGANIZED HEALTH CARE EDUCATION/TRAINING PROGRAM

## 2024-12-11 PROCEDURE — 99100 ANES PT EXTEME AGE<1 YR&>70: CPT | Performed by: ANESTHESIOLOGY

## 2024-12-11 PROCEDURE — 2500000005 HC RX 250 GENERAL PHARMACY W/O HCPCS: Performed by: STUDENT IN AN ORGANIZED HEALTH CARE EDUCATION/TRAINING PROGRAM

## 2024-12-11 PROCEDURE — 85610 PROTHROMBIN TIME: CPT | Performed by: EMERGENCY MEDICINE

## 2024-12-11 PROCEDURE — 71045 X-RAY EXAM CHEST 1 VIEW: CPT

## 2024-12-11 PROCEDURE — C1769 GUIDE WIRE: HCPCS | Performed by: STUDENT IN AN ORGANIZED HEALTH CARE EDUCATION/TRAINING PROGRAM

## 2024-12-11 PROCEDURE — 84484 ASSAY OF TROPONIN QUANT: CPT | Performed by: EMERGENCY MEDICINE

## 2024-12-11 PROCEDURE — 85025 COMPLETE CBC W/AUTO DIFF WBC: CPT | Performed by: EMERGENCY MEDICINE

## 2024-12-11 PROCEDURE — 73502 X-RAY EXAM HIP UNI 2-3 VIEWS: CPT | Mod: LEFT SIDE | Performed by: RADIOLOGY

## 2024-12-11 PROCEDURE — 85730 THROMBOPLASTIN TIME PARTIAL: CPT | Performed by: EMERGENCY MEDICINE

## 2024-12-11 PROCEDURE — 2500000004 HC RX 250 GENERAL PHARMACY W/ HCPCS (ALT 636 FOR OP/ED): Performed by: NURSE ANESTHETIST, CERTIFIED REGISTERED

## 2024-12-11 PROCEDURE — 99285 EMERGENCY DEPT VISIT HI MDM: CPT | Mod: 25 | Performed by: EMERGENCY MEDICINE

## 2024-12-11 PROCEDURE — A27245 PR OPEN FIX INTER/SUBTROCH FX,IMPLNT: Performed by: ANESTHESIOLOGIST ASSISTANT

## 2024-12-11 PROCEDURE — 2500000001 HC RX 250 WO HCPCS SELF ADMINISTERED DRUGS (ALT 637 FOR MEDICARE OP): Performed by: EMERGENCY MEDICINE

## 2024-12-11 PROCEDURE — 7100000002 HC RECOVERY ROOM TIME - EACH INCREMENTAL 1 MINUTE: Performed by: STUDENT IN AN ORGANIZED HEALTH CARE EDUCATION/TRAINING PROGRAM

## 2024-12-11 PROCEDURE — 3700000001 HC GENERAL ANESTHESIA TIME - INITIAL BASE CHARGE: Performed by: STUDENT IN AN ORGANIZED HEALTH CARE EDUCATION/TRAINING PROGRAM

## 2024-12-11 PROCEDURE — 3700000002 HC GENERAL ANESTHESIA TIME - EACH INCREMENTAL 1 MINUTE: Performed by: STUDENT IN AN ORGANIZED HEALTH CARE EDUCATION/TRAINING PROGRAM

## 2024-12-11 PROCEDURE — 71045 X-RAY EXAM CHEST 1 VIEW: CPT | Performed by: RADIOLOGY

## 2024-12-11 PROCEDURE — 81001 URINALYSIS AUTO W/SCOPE: CPT | Performed by: EMERGENCY MEDICINE

## 2024-12-11 PROCEDURE — 82550 ASSAY OF CK (CPK): CPT | Performed by: NURSE PRACTITIONER

## 2024-12-11 PROCEDURE — 76000 FLUOROSCOPY <1 HR PHYS/QHP: CPT

## 2024-12-11 PROCEDURE — 86901 BLOOD TYPING SEROLOGIC RH(D): CPT | Performed by: EMERGENCY MEDICINE

## 2024-12-11 PROCEDURE — 87635 SARS-COV-2 COVID-19 AMP PRB: CPT | Performed by: NURSE PRACTITIONER

## 2024-12-11 PROCEDURE — 2500000001 HC RX 250 WO HCPCS SELF ADMINISTERED DRUGS (ALT 637 FOR MEDICARE OP): Performed by: STUDENT IN AN ORGANIZED HEALTH CARE EDUCATION/TRAINING PROGRAM

## 2024-12-11 PROCEDURE — 99222 1ST HOSP IP/OBS MODERATE 55: CPT | Performed by: INTERNAL MEDICINE

## 2024-12-11 PROCEDURE — 70450 CT HEAD/BRAIN W/O DYE: CPT

## 2024-12-11 PROCEDURE — 7100000001 HC RECOVERY ROOM TIME - INITIAL BASE CHARGE: Performed by: STUDENT IN AN ORGANIZED HEALTH CARE EDUCATION/TRAINING PROGRAM

## 2024-12-11 PROCEDURE — 2500000004 HC RX 250 GENERAL PHARMACY W/ HCPCS (ALT 636 FOR OP/ED)

## 2024-12-11 PROCEDURE — 2780000003 HC OR 278 NO HCPCS: Performed by: STUDENT IN AN ORGANIZED HEALTH CARE EDUCATION/TRAINING PROGRAM

## 2024-12-11 PROCEDURE — A27245 PR OPEN FIX INTER/SUBTROCH FX,IMPLNT: Performed by: ANESTHESIOLOGY

## 2024-12-11 PROCEDURE — 93005 ELECTROCARDIOGRAM TRACING: CPT

## 2024-12-11 PROCEDURE — 73502 X-RAY EXAM HIP UNI 2-3 VIEWS: CPT | Mod: LT

## 2024-12-11 PROCEDURE — 70450 CT HEAD/BRAIN W/O DYE: CPT | Performed by: RADIOLOGY

## 2024-12-11 PROCEDURE — 2500000004 HC RX 250 GENERAL PHARMACY W/ HCPCS (ALT 636 FOR OP/ED): Performed by: NURSE PRACTITIONER

## 2024-12-11 DEVICE — SCREW, TFNA, 85MM, STERILE: Type: IMPLANTABLE DEVICE | Site: HIP | Status: FUNCTIONAL

## 2024-12-11 DEVICE — IMPLANTABLE DEVICE: Type: IMPLANTABLE DEVICE | Site: HIP | Status: FUNCTIONAL

## 2024-12-11 DEVICE — NAIL, TFN ADV SHORT, 170MML, DIAMETER 10, 125 DEG: Type: IMPLANTABLE DEVICE | Site: HIP | Status: FUNCTIONAL

## 2024-12-11 RX ORDER — ONDANSETRON HYDROCHLORIDE 2 MG/ML
4 INJECTION, SOLUTION INTRAVENOUS EVERY 8 HOURS PRN
Status: DISCONTINUED | OUTPATIENT
Start: 2024-12-11 | End: 2024-12-13 | Stop reason: HOSPADM

## 2024-12-11 RX ORDER — SODIUM CHLORIDE, SODIUM LACTATE, POTASSIUM CHLORIDE, CALCIUM CHLORIDE 600; 310; 30; 20 MG/100ML; MG/100ML; MG/100ML; MG/100ML
100 INJECTION, SOLUTION INTRAVENOUS CONTINUOUS
Status: DISCONTINUED | OUTPATIENT
Start: 2024-12-11 | End: 2024-12-11 | Stop reason: HOSPADM

## 2024-12-11 RX ORDER — CYCLOBENZAPRINE HCL 10 MG
10 TABLET ORAL 3 TIMES DAILY PRN
Status: DISCONTINUED | OUTPATIENT
Start: 2024-12-11 | End: 2024-12-13 | Stop reason: HOSPADM

## 2024-12-11 RX ORDER — MORPHINE SULFATE 2 MG/ML
1 INJECTION, SOLUTION INTRAMUSCULAR; INTRAVENOUS EVERY 4 HOURS PRN
Status: DISCONTINUED | OUTPATIENT
Start: 2024-12-11 | End: 2024-12-13 | Stop reason: HOSPADM

## 2024-12-11 RX ORDER — ACETAMINOPHEN 325 MG/1
650 TABLET ORAL ONCE
Status: COMPLETED | OUTPATIENT
Start: 2024-12-11 | End: 2024-12-11

## 2024-12-11 RX ORDER — SUCCINYLCHOLINE CHLORIDE 20 MG/ML INJECTION SOLUTION
SOLUTION AS NEEDED
Status: DISCONTINUED | OUTPATIENT
Start: 2024-12-11 | End: 2024-12-11

## 2024-12-11 RX ORDER — KETAMINE HCL IN NACL, ISO-OSM 100MG/10ML
SYRINGE (ML) INJECTION AS NEEDED
Status: DISCONTINUED | OUTPATIENT
Start: 2024-12-11 | End: 2024-12-11

## 2024-12-11 RX ORDER — METOCLOPRAMIDE HYDROCHLORIDE 5 MG/ML
10 INJECTION INTRAMUSCULAR; INTRAVENOUS EVERY 6 HOURS PRN
Status: DISCONTINUED | OUTPATIENT
Start: 2024-12-11 | End: 2024-12-13 | Stop reason: HOSPADM

## 2024-12-11 RX ORDER — METOCLOPRAMIDE 10 MG/1
10 TABLET ORAL EVERY 6 HOURS PRN
Status: DISCONTINUED | OUTPATIENT
Start: 2024-12-11 | End: 2024-12-13 | Stop reason: HOSPADM

## 2024-12-11 RX ORDER — OXYCODONE HYDROCHLORIDE 5 MG/1
5 TABLET ORAL EVERY 6 HOURS PRN
Status: DISCONTINUED | OUTPATIENT
Start: 2024-12-11 | End: 2024-12-13 | Stop reason: HOSPADM

## 2024-12-11 RX ORDER — OLANZAPINE 5 MG/1
2.5 TABLET ORAL EVERY 6 HOURS PRN
Status: DISCONTINUED | OUTPATIENT
Start: 2024-12-11 | End: 2024-12-13 | Stop reason: HOSPADM

## 2024-12-11 RX ORDER — FENTANYL CITRATE 50 UG/ML
INJECTION, SOLUTION INTRAMUSCULAR; INTRAVENOUS AS NEEDED
Status: DISCONTINUED | OUTPATIENT
Start: 2024-12-11 | End: 2024-12-11

## 2024-12-11 RX ORDER — MORPHINE SULFATE 2 MG/ML
1 INJECTION, SOLUTION INTRAMUSCULAR; INTRAVENOUS ONCE
Status: COMPLETED | OUTPATIENT
Start: 2024-12-11 | End: 2024-12-11

## 2024-12-11 RX ORDER — ONDANSETRON 4 MG/1
4 TABLET, FILM COATED ORAL EVERY 8 HOURS PRN
Status: DISCONTINUED | OUTPATIENT
Start: 2024-12-11 | End: 2024-12-13 | Stop reason: HOSPADM

## 2024-12-11 RX ORDER — OLANZAPINE 10 MG/2ML
INJECTION, POWDER, FOR SOLUTION INTRAMUSCULAR
Status: COMPLETED
Start: 2024-12-11 | End: 2024-12-11

## 2024-12-11 RX ORDER — MIDAZOLAM HYDROCHLORIDE 1 MG/ML
INJECTION, SOLUTION INTRAMUSCULAR; INTRAVENOUS AS NEEDED
Status: DISCONTINUED | OUTPATIENT
Start: 2024-12-11 | End: 2024-12-11

## 2024-12-11 RX ORDER — ACETAMINOPHEN 325 MG/1
650 TABLET ORAL EVERY 6 HOURS PRN
Status: DISCONTINUED | OUTPATIENT
Start: 2024-12-11 | End: 2024-12-13 | Stop reason: HOSPADM

## 2024-12-11 RX ORDER — MORPHINE SULFATE 2 MG/ML
2 INJECTION, SOLUTION INTRAMUSCULAR; INTRAVENOUS EVERY 2 HOUR PRN
Status: DISCONTINUED | OUTPATIENT
Start: 2024-12-11 | End: 2024-12-13 | Stop reason: HOSPADM

## 2024-12-11 RX ORDER — OLANZAPINE 10 MG/2ML
2.5 INJECTION, POWDER, FOR SOLUTION INTRAMUSCULAR EVERY 6 HOURS PRN
Status: DISCONTINUED | OUTPATIENT
Start: 2024-12-11 | End: 2024-12-13 | Stop reason: HOSPADM

## 2024-12-11 RX ORDER — SODIUM CHLORIDE 0.9 G/100ML
IRRIGANT IRRIGATION AS NEEDED
Status: DISCONTINUED | OUTPATIENT
Start: 2024-12-11 | End: 2024-12-11 | Stop reason: HOSPADM

## 2024-12-11 RX ORDER — NALOXONE HYDROCHLORIDE 1 MG/ML
0.2 INJECTION INTRAMUSCULAR; INTRAVENOUS; SUBCUTANEOUS EVERY 5 MIN PRN
Status: DISCONTINUED | OUTPATIENT
Start: 2024-12-11 | End: 2024-12-13 | Stop reason: HOSPADM

## 2024-12-11 RX ORDER — ACETAMINOPHEN 325 MG/1
650 TABLET ORAL EVERY 4 HOURS PRN
Status: DISCONTINUED | OUTPATIENT
Start: 2024-12-11 | End: 2024-12-13 | Stop reason: HOSPADM

## 2024-12-11 RX ORDER — LIDOCAINE HYDROCHLORIDE 10 MG/ML
0.1 INJECTION, SOLUTION INFILTRATION; PERINEURAL ONCE
Status: DISCONTINUED | OUTPATIENT
Start: 2024-12-11 | End: 2024-12-11 | Stop reason: HOSPADM

## 2024-12-11 RX ORDER — ASPIRIN 81 MG/1
81 TABLET ORAL 2 TIMES DAILY
Status: DISCONTINUED | OUTPATIENT
Start: 2024-12-12 | End: 2024-12-13 | Stop reason: HOSPADM

## 2024-12-11 RX ORDER — ACETAMINOPHEN 500 MG
5 TABLET ORAL NIGHTLY
Status: DISCONTINUED | OUTPATIENT
Start: 2024-12-11 | End: 2024-12-13 | Stop reason: HOSPADM

## 2024-12-11 RX ORDER — ROCURONIUM BROMIDE 10 MG/ML
INJECTION, SOLUTION INTRAVENOUS AS NEEDED
Status: DISCONTINUED | OUTPATIENT
Start: 2024-12-11 | End: 2024-12-11

## 2024-12-11 RX ORDER — LIDOCAINE HYDROCHLORIDE 20 MG/ML
10 INJECTION, SOLUTION EPIDURAL; INFILTRATION; INTRACAUDAL; PERINEURAL ONCE
Status: DISCONTINUED | OUTPATIENT
Start: 2024-12-11 | End: 2024-12-11

## 2024-12-11 RX ORDER — LIDOCAINE HCL/PF 100 MG/5ML
SYRINGE (ML) INTRAVENOUS AS NEEDED
Status: DISCONTINUED | OUTPATIENT
Start: 2024-12-11 | End: 2024-12-11

## 2024-12-11 RX ORDER — ONDANSETRON HYDROCHLORIDE 2 MG/ML
INJECTION, SOLUTION INTRAVENOUS AS NEEDED
Status: DISCONTINUED | OUTPATIENT
Start: 2024-12-11 | End: 2024-12-11

## 2024-12-11 RX ORDER — PHENYLEPHRINE HCL IN 0.9% NACL 1 MG/10 ML
SYRINGE (ML) INTRAVENOUS AS NEEDED
Status: DISCONTINUED | OUTPATIENT
Start: 2024-12-11 | End: 2024-12-11

## 2024-12-11 RX ORDER — CEFAZOLIN SODIUM 2 G/100ML
2 INJECTION, SOLUTION INTRAVENOUS
Status: COMPLETED | OUTPATIENT
Start: 2024-12-11 | End: 2024-12-11

## 2024-12-11 RX ORDER — BISACODYL 5 MG
10 TABLET, DELAYED RELEASE (ENTERIC COATED) ORAL DAILY PRN
Status: DISCONTINUED | OUTPATIENT
Start: 2024-12-11 | End: 2024-12-13 | Stop reason: HOSPADM

## 2024-12-11 RX ORDER — DIPHENHYDRAMINE HCL 25 MG
25 CAPSULE ORAL NIGHTLY PRN
Status: DISCONTINUED | OUTPATIENT
Start: 2024-12-11 | End: 2024-12-13 | Stop reason: HOSPADM

## 2024-12-11 RX ORDER — POLYETHYLENE GLYCOL 3350 17 G/17G
17 POWDER, FOR SOLUTION ORAL DAILY
Status: DISCONTINUED | OUTPATIENT
Start: 2024-12-11 | End: 2024-12-13 | Stop reason: HOSPADM

## 2024-12-11 RX ORDER — ACETAMINOPHEN 500 MG
1000 TABLET ORAL EVERY 6 HOURS PRN
COMMUNITY
End: 2024-12-13 | Stop reason: HOSPADM

## 2024-12-11 RX ORDER — METOPROLOL SUCCINATE 50 MG/1
50 TABLET, EXTENDED RELEASE ORAL DAILY
Status: DISCONTINUED | OUTPATIENT
Start: 2024-12-11 | End: 2024-12-13 | Stop reason: HOSPADM

## 2024-12-11 RX ORDER — MEPERIDINE HYDROCHLORIDE 25 MG/ML
12.5 INJECTION INTRAMUSCULAR; INTRAVENOUS; SUBCUTANEOUS EVERY 10 MIN PRN
Status: DISCONTINUED | OUTPATIENT
Start: 2024-12-11 | End: 2024-12-11 | Stop reason: HOSPADM

## 2024-12-11 RX ORDER — ACETAMINOPHEN 325 MG/1
650 TABLET ORAL EVERY 6 HOURS
Status: DISCONTINUED | OUTPATIENT
Start: 2024-12-11 | End: 2024-12-13 | Stop reason: HOSPADM

## 2024-12-11 RX ORDER — FENTANYL CITRATE 50 UG/ML
25 INJECTION, SOLUTION INTRAMUSCULAR; INTRAVENOUS EVERY 5 MIN PRN
Status: DISCONTINUED | OUTPATIENT
Start: 2024-12-11 | End: 2024-12-11 | Stop reason: HOSPADM

## 2024-12-11 RX ORDER — ACETAMINOPHEN 500 MG
5 TABLET ORAL NIGHTLY
COMMUNITY

## 2024-12-11 RX ORDER — FENTANYL CITRATE 50 UG/ML
50 INJECTION, SOLUTION INTRAMUSCULAR; INTRAVENOUS EVERY 5 MIN PRN
Status: DISCONTINUED | OUTPATIENT
Start: 2024-12-11 | End: 2024-12-11 | Stop reason: HOSPADM

## 2024-12-11 RX ORDER — CEFAZOLIN SODIUM 2 G/100ML
2 INJECTION, SOLUTION INTRAVENOUS EVERY 8 HOURS
Status: DISPENSED | OUTPATIENT
Start: 2024-12-11 | End: 2024-12-12

## 2024-12-11 RX ORDER — OXYCODONE HYDROCHLORIDE 5 MG/1
10 TABLET ORAL EVERY 4 HOURS PRN
Status: DISCONTINUED | OUTPATIENT
Start: 2024-12-11 | End: 2024-12-13 | Stop reason: HOSPADM

## 2024-12-11 RX ORDER — TRIAMCINOLONE ACETONIDE 40 MG/ML
10 INJECTION, SUSPENSION INTRA-ARTICULAR; INTRAMUSCULAR ONCE
Status: DISCONTINUED | OUTPATIENT
Start: 2024-12-11 | End: 2024-12-11

## 2024-12-11 SDOH — SOCIAL STABILITY: SOCIAL INSECURITY: DO YOU FEEL UNSAFE GOING BACK TO THE PLACE WHERE YOU ARE LIVING?: UNABLE TO ASSESS

## 2024-12-11 SDOH — SOCIAL STABILITY: SOCIAL INSECURITY: DOES ANYONE TRY TO KEEP YOU FROM HAVING/CONTACTING OTHER FRIENDS OR DOING THINGS OUTSIDE YOUR HOME?: UNABLE TO ASSESS

## 2024-12-11 SDOH — ECONOMIC STABILITY: INCOME INSECURITY: IN THE PAST 12 MONTHS HAS THE ELECTRIC, GAS, OIL, OR WATER COMPANY THREATENED TO SHUT OFF SERVICES IN YOUR HOME?: NO

## 2024-12-11 SDOH — SOCIAL STABILITY: SOCIAL INSECURITY: HAS ANYONE EVER THREATENED TO HURT YOUR FAMILY OR YOUR PETS?: UNABLE TO ASSESS

## 2024-12-11 SDOH — ECONOMIC STABILITY: FOOD INSECURITY
HOW HARD IS IT FOR YOU TO PAY FOR THE VERY BASICS LIKE FOOD, HOUSING, MEDICAL CARE, AND HEATING?: PATIENT UNABLE TO ANSWER

## 2024-12-11 SDOH — SOCIAL STABILITY: SOCIAL INSECURITY
WITHIN THE LAST YEAR, HAVE YOU BEEN HUMILIATED OR EMOTIONALLY ABUSED IN OTHER WAYS BY YOUR PARTNER OR EX-PARTNER?: PATIENT UNABLE TO ANSWER

## 2024-12-11 SDOH — SOCIAL STABILITY: SOCIAL INSECURITY
WITHIN THE LAST YEAR, HAVE YOU BEEN KICKED, HIT, SLAPPED, OR OTHERWISE PHYSICALLY HURT BY YOUR PARTNER OR EX-PARTNER?: PATIENT UNABLE TO ANSWER

## 2024-12-11 SDOH — SOCIAL STABILITY: SOCIAL INSECURITY: ARE THERE ANY APPARENT SIGNS OF INJURIES/BEHAVIORS THAT COULD BE RELATED TO ABUSE/NEGLECT?: UNABLE TO ASSESS

## 2024-12-11 SDOH — ECONOMIC STABILITY: FOOD INSECURITY
WITHIN THE PAST 12 MONTHS, THE FOOD YOU BOUGHT JUST DIDN'T LAST AND YOU DIDN'T HAVE MONEY TO GET MORE.: PATIENT UNABLE TO ANSWER

## 2024-12-11 SDOH — SOCIAL STABILITY: SOCIAL INSECURITY: WERE YOU ABLE TO COMPLETE ALL THE BEHAVIORAL HEALTH SCREENINGS?: YES

## 2024-12-11 SDOH — SOCIAL STABILITY: SOCIAL INSECURITY
WITHIN THE LAST YEAR, HAVE YOU BEEN RAPED OR FORCED TO HAVE ANY KIND OF SEXUAL ACTIVITY BY YOUR PARTNER OR EX-PARTNER?: PATIENT UNABLE TO ANSWER

## 2024-12-11 SDOH — SOCIAL STABILITY: SOCIAL INSECURITY: ABUSE: ADULT

## 2024-12-11 SDOH — ECONOMIC STABILITY: HOUSING INSECURITY
IN THE LAST 12 MONTHS, WAS THERE A TIME WHEN YOU WERE NOT ABLE TO PAY THE MORTGAGE OR RENT ON TIME?: PATIENT UNABLE TO ANSWER

## 2024-12-11 SDOH — ECONOMIC STABILITY: FOOD INSECURITY
WITHIN THE PAST 12 MONTHS, YOU WORRIED THAT YOUR FOOD WOULD RUN OUT BEFORE YOU GOT THE MONEY TO BUY MORE.: PATIENT UNABLE TO ANSWER

## 2024-12-11 SDOH — SOCIAL STABILITY: SOCIAL INSECURITY: DO YOU FEEL ANYONE HAS EXPLOITED OR TAKEN ADVANTAGE OF YOU FINANCIALLY OR OF YOUR PERSONAL PROPERTY?: UNABLE TO ASSESS

## 2024-12-11 SDOH — ECONOMIC STABILITY: TRANSPORTATION INSECURITY: IN THE PAST 12 MONTHS, HAS LACK OF TRANSPORTATION KEPT YOU FROM MEDICAL APPOINTMENTS OR FROM GETTING MEDICATIONS?: NO

## 2024-12-11 SDOH — HEALTH STABILITY: MENTAL HEALTH: CURRENT SMOKER: 0

## 2024-12-11 SDOH — SOCIAL STABILITY: SOCIAL INSECURITY: ARE YOU OR HAVE YOU BEEN THREATENED OR ABUSED PHYSICALLY, EMOTIONALLY, OR SEXUALLY BY ANYONE?: UNABLE TO ASSESS

## 2024-12-11 SDOH — SOCIAL STABILITY: SOCIAL INSECURITY: HAVE YOU HAD ANY THOUGHTS OF HARMING ANYONE ELSE?: UNABLE TO ASSESS

## 2024-12-11 SDOH — SOCIAL STABILITY: SOCIAL INSECURITY: WITHIN THE LAST YEAR, HAVE YOU BEEN AFRAID OF YOUR PARTNER OR EX-PARTNER?: PATIENT UNABLE TO ANSWER

## 2024-12-11 SDOH — ECONOMIC STABILITY: HOUSING INSECURITY: IN THE PAST 12 MONTHS, HOW MANY TIMES HAVE YOU MOVED WHERE YOU WERE LIVING?: 1

## 2024-12-11 SDOH — SOCIAL STABILITY: SOCIAL INSECURITY: HAVE YOU HAD THOUGHTS OF HARMING ANYONE ELSE?: UNABLE TO ASSESS

## 2024-12-11 SDOH — ECONOMIC STABILITY: HOUSING INSECURITY: AT ANY TIME IN THE PAST 12 MONTHS, WERE YOU HOMELESS OR LIVING IN A SHELTER (INCLUDING NOW)?: PATIENT UNABLE TO ANSWER

## 2024-12-11 ASSESSMENT — COGNITIVE AND FUNCTIONAL STATUS - GENERAL
MOBILITY SCORE: 6
STANDING UP FROM CHAIR USING ARMS: TOTAL
TOILETING: TOTAL
HELP NEEDED FOR BATHING: TOTAL
CLIMB 3 TO 5 STEPS WITH RAILING: TOTAL
DAILY ACTIVITIY SCORE: 6
MOVING TO AND FROM BED TO CHAIR: TOTAL
EATING MEALS: TOTAL
PATIENT BASELINE BEDBOUND: NO
DRESSING REGULAR LOWER BODY CLOTHING: TOTAL
DRESSING REGULAR UPPER BODY CLOTHING: TOTAL
PERSONAL GROOMING: TOTAL
MOVING FROM LYING ON BACK TO SITTING ON SIDE OF FLAT BED WITH BEDRAILS: TOTAL
WALKING IN HOSPITAL ROOM: TOTAL
TURNING FROM BACK TO SIDE WHILE IN FLAT BAD: TOTAL

## 2024-12-11 ASSESSMENT — ACTIVITIES OF DAILY LIVING (ADL)
ADEQUATE_TO_COMPLETE_ADL: YES
FEEDING YOURSELF: DEPENDENT
HEARING - LEFT EAR: DIFFICULTY WITH NOISE
GROOMING: DEPENDENT
DRESSING YOURSELF: DEPENDENT
HEARING - RIGHT EAR: DIFFICULTY WITH NOISE
BATHING: DEPENDENT
JUDGMENT_ADEQUATE_SAFELY_COMPLETE_DAILY_ACTIVITIES: NO
WALKS IN HOME: DEPENDENT
PATIENT'S MEMORY ADEQUATE TO SAFELY COMPLETE DAILY ACTIVITIES?: NO
TOILETING: DEPENDENT
LACK_OF_TRANSPORTATION: NO
LACK_OF_TRANSPORTATION: NO

## 2024-12-11 ASSESSMENT — LIFESTYLE VARIABLES
HAVE PEOPLE ANNOYED YOU BY CRITICIZING YOUR DRINKING: NO
SKIP TO QUESTIONS 9-10: 0
HOW OFTEN DO YOU HAVE 6 OR MORE DRINKS ON ONE OCCASION: PATIENT UNABLE TO ANSWER
AUDIT-C TOTAL SCORE: -1
AUDIT-C TOTAL SCORE: -1
HOW MANY STANDARD DRINKS CONTAINING ALCOHOL DO YOU HAVE ON A TYPICAL DAY: PATIENT UNABLE TO ANSWER
EVER HAD A DRINK FIRST THING IN THE MORNING TO STEADY YOUR NERVES TO GET RID OF A HANGOVER: NO
HOW OFTEN DO YOU HAVE A DRINK CONTAINING ALCOHOL: PATIENT UNABLE TO ANSWER
EVER FELT BAD OR GUILTY ABOUT YOUR DRINKING: NO
TOTAL SCORE: 0
HAVE YOU EVER FELT YOU SHOULD CUT DOWN ON YOUR DRINKING: NO

## 2024-12-11 ASSESSMENT — PAIN SCALES - WONG BAKER: WONGBAKER_NUMERICALRESPONSE: HURTS LITTLE BIT

## 2024-12-11 ASSESSMENT — PAIN - FUNCTIONAL ASSESSMENT
PAIN_FUNCTIONAL_ASSESSMENT: 0-10

## 2024-12-11 ASSESSMENT — PAIN SCALES - GENERAL
PAINLEVEL_OUTOF10: 1
PAIN_LEVEL: 0
PAINLEVEL_OUTOF10: 2
PAINLEVEL_OUTOF10: 5 - MODERATE PAIN
PAINLEVEL_OUTOF10: 5 - MODERATE PAIN
PAINLEVEL_OUTOF10: 3

## 2024-12-11 ASSESSMENT — COLUMBIA-SUICIDE SEVERITY RATING SCALE - C-SSRS
6. HAVE YOU EVER DONE ANYTHING, STARTED TO DO ANYTHING, OR PREPARED TO DO ANYTHING TO END YOUR LIFE?: NO
2. HAVE YOU ACTUALLY HAD ANY THOUGHTS OF KILLING YOURSELF?: NO
1. IN THE PAST MONTH, HAVE YOU WISHED YOU WERE DEAD OR WISHED YOU COULD GO TO SLEEP AND NOT WAKE UP?: NO

## 2024-12-11 ASSESSMENT — PAIN DESCRIPTION - ORIENTATION: ORIENTATION: LEFT

## 2024-12-11 ASSESSMENT — PATIENT HEALTH QUESTIONNAIRE - PHQ9
1. LITTLE INTEREST OR PLEASURE IN DOING THINGS: NOT AT ALL
2. FEELING DOWN, DEPRESSED OR HOPELESS: NOT AT ALL
SUM OF ALL RESPONSES TO PHQ9 QUESTIONS 1 & 2: 0

## 2024-12-11 ASSESSMENT — PAIN DESCRIPTION - PAIN TYPE: TYPE: ACUTE PAIN

## 2024-12-11 ASSESSMENT — PAIN DESCRIPTION - LOCATION
LOCATION: HIP
LOCATION: HIP

## 2024-12-11 NOTE — H&P
History Of Present Illness  Julissa Plasencia is a 89 y.o. female    with a past medical history of hypertension, confusion, headaches, weakness, falls, and pelvic fracture who presented today after a fall.  HPI is limited due to patient's confusion.  She notes she does recall falling.  Reports she ambulated to the bathroom and it was wet on the floor and she slipped.  She reports she was on the floor for a time and was calling for help.  She admits to head injury but does not recall any dizziness prior to the fall.  It is difficult to ascertain if patient has been ill recently.  She complains of severe left hip pain.  Medical record indicates patient was sent in from nursing home after she was found down on the floor with a head injury so 911 was called.    In the ED lab work, EKG, chest x-ray, head CT, C-spine CT, and hip x-ray were performed. Labs revealed sodium 132 (appears chronic), chloride 94 (105 on July 2, 2024), troponins negative x 2, white blood cell count 20.7, neutrophils 17.81, monocytes 1.14, UA revealed 2+ blood, 1+ bacteria, and 6-10 red blood cells. Head CT was notable for left frontal hematoma, probable left calcified meningioma.  C-spine CT was without acute process.  Chest x-ray noted a possible nodule in the right infrahilar region.  Hip x-ray revealed nondisplaced left intertrochanteric hip fracture.  EKG revealed sinus rhythm, rate 81, PAC, incomplete right bundle branch block, and prolonged QT with QTc 517, unchanged from previous EKG on November 9, 2023.  She arrived with a blood pressure of 186/79, her vitals were otherwise stable.  She was given Tylenol and admitted for further evaluation and treatment under the care of Dr. Andrew Alamo.    Limited review of systems performed due to patient's confusion.    Past Medical History  Past Medical History:   Diagnosis Date    Abnormal uterine and vaginal bleeding, unspecified 12/28/2016    Abnormal vaginal bleeding    Biliary acute  pancreatitis without necrosis or infection ([facility]) 02/02/2017    Acute biliary pancreatitis without infection or necrosis    Brain tumor ([facility])     Motion sickness, initial encounter 10/11/2016    Sea sickness    Other female genital prolapse 06/13/2017    Pelvic relaxation    Other specified cough 12/30/2014    Cough due to ACE inhibitor    Pain in right shoulder 06/01/2017    Acute pain of right shoulder    Personal history of other diseases of the digestive system 06/05/2017    History of acute pancreatitis    Personal history of other diseases of the digestive system 03/06/2017    History of acute pancreatitis    Personal history of urinary (tract) infections 03/21/2017    History of urinary tract infection       Surgical History  Past Surgical History:   Procedure Laterality Date    COLONOSCOPY  08/12/2015    Complete Colonoscopy    ESOPHAGOGASTRODUODENOSCOPY  12/08/2015    Diagnostic Esophagogastroduodenoscopy        Social History  She reports that she has quit smoking. Her smoking use included cigarettes. She has never used smokeless tobacco. She reports that she does not drink alcohol and does not use drugs.    Family History  MI, diabetes  Allergies  Lisinopril     Physical Exam  Constitutional:       Comments: thin   HENT:      Head: Normocephalic.      Ears:      Comments: Left forehead hematoma/abrasion noted     Nose: Nose normal.      Mouth/Throat:      Mouth: Mucous membranes are dry.   Eyes:      Conjunctiva/sclera: Conjunctivae normal.   Cardiovascular:      Rate and Rhythm: Normal rate and regular rhythm.      Heart sounds: Normal heart sounds.   Pulmonary:      Effort: Pulmonary effort is normal.      Breath sounds: Normal breath sounds.      Comments: Anteriorly  Abdominal:      General: Bowel sounds are normal.      Palpations: Abdomen is soft.      Tenderness: There is no abdominal tenderness.   Musculoskeletal:      Cervical back: Neck supple.      Comments: Decreased range of motion to  "left hip, circulation movement sensation intact to left toes   Skin:     General: Skin is warm.      Comments: See head assessment   Neurological:      Mental Status: She is alert. Mental status is at baseline.   Psychiatric:         Mood and Affect: Mood normal.          Last Recorded Vitals  Blood pressure 153/67, pulse 79, temperature 35.8 °C (96.4 °F), resp. rate (!) 30, height 1.6 m (5' 3\"), weight 45.4 kg (100 lb), SpO2 96%.    Relevant Results    No current facility-administered medications on file prior to encounter.     Current Outpatient Medications on File Prior to Encounter   Medication Sig Dispense Refill    metoprolol succinate XL (Toprol-XL) 50 mg 24 hr tablet TAKE 1 TABLET BY MOUTH ONCE  DAILY 90 tablet 3    acetaminophen (Tylenol) 325 mg tablet Take 2 tablets (650 mg) by mouth every 6 hours.      acetaminophen (Tylenol) 500 mg tablet Take 2 tablets (1,000 mg) by mouth every 6 hours if needed for mild pain (1 - 3).      cholecalciferol (Vitamin D-3) 50 mcg (2,000 unit) capsule Take 1 capsule (50 mcg) by mouth early in the morning..      diphenhydrAMINE-acetaminophen (Tylenol PM)  mg per tablet Take 1 tablet by mouth once daily at bedtime.      melatonin 5 mg tablet Take 1 tablet (5 mg) by mouth once daily at bedtime.      polyethylene glycol (Glycolax, Miralax) 17 gram packet Take 17 g by mouth once daily. (Patient taking differently: Take 17 g by mouth once daily as needed.)          Results for orders placed or performed during the hospital encounter of 12/11/24 (from the past 24 hours)   CBC and Auto Differential   Result Value Ref Range    WBC 20.7 (H) 4.4 - 11.3 x10*3/uL    nRBC 0.0 0.0 - 0.0 /100 WBCs    RBC 4.03 4.00 - 5.20 x10*6/uL    Hemoglobin 12.7 12.0 - 16.0 g/dL    Hematocrit 37.6 36.0 - 46.0 %    MCV 93 80 - 100 fL    MCH 31.5 26.0 - 34.0 pg    MCHC 33.8 32.0 - 36.0 g/dL    RDW 13.2 11.5 - 14.5 %    Platelets 296 150 - 450 x10*3/uL    Neutrophils % 86.1 40.0 - 80.0 %    Immature " Granulocytes %, Automated 0.7 0.0 - 0.9 %    Lymphocytes % 7.2 13.0 - 44.0 %    Monocytes % 5.5 2.0 - 10.0 %    Eosinophils % 0.2 0.0 - 6.0 %    Basophils % 0.3 0.0 - 2.0 %    Neutrophils Absolute 17.81 (H) 1.60 - 5.50 x10*3/uL    Immature Granulocytes Absolute, Automated 0.15 0.00 - 0.50 x10*3/uL    Lymphocytes Absolute 1.48 0.80 - 3.00 x10*3/uL    Monocytes Absolute 1.14 (H) 0.05 - 0.80 x10*3/uL    Eosinophils Absolute 0.04 0.00 - 0.40 x10*3/uL    Basophils Absolute 0.06 0.00 - 0.10 x10*3/uL   Comprehensive metabolic panel   Result Value Ref Range    Glucose 105 (H) 74 - 99 mg/dL    Sodium 132 (L) 136 - 145 mmol/L    Potassium 4.1 3.5 - 5.3 mmol/L    Chloride 94 (L) 98 - 107 mmol/L    Bicarbonate 30 21 - 32 mmol/L    Anion Gap 12 10 - 20 mmol/L    Urea Nitrogen 11 6 - 23 mg/dL    Creatinine 0.52 0.50 - 1.05 mg/dL    eGFR 86 >60 mL/min/1.73m*2    Calcium 9.8 8.6 - 10.3 mg/dL    Albumin 4.3 3.4 - 5.0 g/dL    Alkaline Phosphatase 132 33 - 136 U/L    Total Protein 7.5 6.4 - 8.2 g/dL    AST 16 9 - 39 U/L    Bilirubin, Total 0.5 0.0 - 1.2 mg/dL    ALT 11 7 - 45 U/L   Protime-INR   Result Value Ref Range    Protime 11.8 9.8 - 12.8 seconds    INR 1.0 0.9 - 1.1   aPTT   Result Value Ref Range    aPTT 32 27 - 38 seconds   Type And Screen   Result Value Ref Range    ABO TYPE O     Rh TYPE POS     ANTIBODY SCREEN NEG    Troponin I, High Sensitivity, Initial   Result Value Ref Range    Troponin I, High Sensitivity 8 0 - 13 ng/L   Troponin, High Sensitivity, 1 Hour   Result Value Ref Range    Troponin I, High Sensitivity 7 0 - 13 ng/L   VERIFY ABO/Rh Group Test   Result Value Ref Range    ABO TYPE O     Rh TYPE POS    Urinalysis with Reflex Culture and Microscopic   Result Value Ref Range    Color, Urine Light-Yellow Light-Yellow, Yellow, Dark-Yellow    Appearance, Urine Clear Clear    Specific Gravity, Urine 1.012 1.005 - 1.035    pH, Urine 6.5 5.0, 5.5, 6.0, 6.5, 7.0, 7.5, 8.0    Protein, Urine NEGATIVE NEGATIVE, 10 (TRACE),  20 (TRACE) mg/dL    Glucose, Urine Normal Normal mg/dL    Blood, Urine 0.2 (2+) (A) NEGATIVE    Ketones, Urine NEGATIVE NEGATIVE mg/dL    Bilirubin, Urine NEGATIVE NEGATIVE    Urobilinogen, Urine Normal Normal mg/dL    Nitrite, Urine NEGATIVE NEGATIVE    Leukocyte Esterase, Urine NEGATIVE NEGATIVE   Urinalysis Microscopic   Result Value Ref Range    WBC, Urine 1-5 1-5, NONE /HPF    RBC, Urine 6-10 (A) NONE, 1-2, 3-5 /HPF    Bacteria, Urine 1+ (A) NONE SEEN /HPF        XR hip left with pelvis when performed 2 or 3 views    Result Date: 12/11/2024  Interpreted By:  Daniel Painter, STUDY: XR HIP LEFT WITH PELVIS WHEN PERFORMED 2 OR 3 VIEWS; ;  12/11/2024 4:38 am   INDICATION: Signs/Symptoms:fall, L hip pain.     COMPARISON: None.   ACCESSION NUMBER(S): [id number]   ORDERING CLINICIAN: ELYSE KLERMAN   FINDINGS: Nondisplaced intertrochanteric left hip fracture seen. This is best visualized on the lateral view. Mild bilateral hip arthritic degenerative changes.       Nondisplaced left intertrochanteric hip fracture.     MACRO: None   Signed by: Daniel Painter 12/11/2024 5:02 AM Dictation workstation:   [id number]    XR chest 1 view    Result Date: 12/11/2024  Interpreted By:  Daniel Painter, STUDY: XR CHEST 1 VIEW;  12/11/2024 4:38 am   INDICATION: Signs/Symptoms:fall.   COMPARISON: 07/02/2024   ACCESSION NUMBER(S): [id number]   ORDERING CLINICIAN: ELYSE KLERMAN   FINDINGS: Mild cardiomegaly.   Some increased interstitial markings appear unchanged which may relate to underlying COPD.   Potential developing nodule seen projecting right infrahilar region measuring 15 mm.   No additional significant new consolidation.   No significant pneumothorax.       Potential developing pulmonary nodule right infrahilar region. Recommend CT imaging for further evaluation.   MACRO: None   Signed by: Daniel Painter 12/11/2024 5:01 AM Dictation workstation:   [id number]    CT head wo IV contrast    Result Date: 12/11/2024  Interpreted  By:  Gretchen Pal, STUDY: CT HEAD WO IV CONTRAST; CT CERVICAL SPINE WO IV CONTRAST;  12/11/2024 3:47 am   INDICATION: Signs/Symptoms:fall, hit head.   COMPARISON: 11/09/2023   ACCESSION NUMBER(S): [id number]; [id number]   ORDERING CLINICIAN: ELYSE KLERMAN   TECHNIQUE: Noncontrast CT images of head. Axial noncontrast CT images of the cervical spine with coronal and sagittal reconstructed images.   FINDINGS: BRAIN PARENCHYMA: Generalized parenchymal volume loss noted with concordant ventricular enlargement. Non-specific white matter changes noted, which may be related to small vessel disease.  No mass effect or midline shift. Calcific lucency of the carotid arteries. Calcific lesion along the lateral left frontal lobe measuring up to 16 mm compatible with a meningioma.   HEMORRHAGE: No acute intracranial hemorrhage. VENTRICLES and EXTRA-AXIAL SPACES: The ventricles, sulci and basal cisterns enlarged, concordant with parenchymal volume loss. EXTRACRANIAL SOFT TISSUES: Left frontal hematoma. PARANASAL SINUSES/MASTOIDS: The visualized paranasal sinuses and mastoid air cells are aerated. CALVARIUM: No depressed skull fracture. Regions of sclerotic and lucent foci in the calvarium similar to prior imaging.   OTHER FINDINGS: Lens replacement.   CERVICAL SPINE:   ALIGNMENT: Minimal anterolisthesis of C3 on C4 and C4 on C5. VERTEBRAE: No acute fracture. The bones are demineralized. Fusion of C1 and the occipital condyles. SPINAL CANAL: Degenerative changes facet and uncinate arthropathy, as well as disc space narrowing and end plate hypertrophy. Moderate to severe left foraminal narrowing at C3-C4. Moderate left foraminal narrowing at C4-C5. No critical spinal canal stenosis. PREVERTEBRAL SOFT TISSUES: No prevertebral soft tissue swelling. LUNG APICES: Biapical pleuroparenchymal thickening, otherwise imaged portion of the lung apices are within normal limits.   OTHER FINDINGS: Atherosclerotic calcifications of the  right vertebral artery and carotid arteries.       Left frontal hematoma. No acute intracranial hemorrhage or mass effect.   Probable left calcified meningioma.   No acute fracture or traumatic subluxation of the cervical spine.   Degenerative changes   MACRO: None.   Signed by: Gretchen Pal 12/11/2024 4:04 AM Dictation workstation:   [id number]    CT cervical spine wo IV contrast    Result Date: 12/11/2024  Interpreted By:  Gretchen Pal, STUDY: CT HEAD WO IV CONTRAST; CT CERVICAL SPINE WO IV CONTRAST;  12/11/2024 3:47 am   INDICATION: Signs/Symptoms:fall, hit head.   COMPARISON: 11/09/2023   ACCESSION NUMBER(S): [id number]; [id number]   ORDERING CLINICIAN: ELYSE KLERMAN   TECHNIQUE: Noncontrast CT images of head. Axial noncontrast CT images of the cervical spine with coronal and sagittal reconstructed images.   FINDINGS: BRAIN PARENCHYMA: Generalized parenchymal volume loss noted with concordant ventricular enlargement. Non-specific white matter changes noted, which may be related to small vessel disease.  No mass effect or midline shift. Calcific lucency of the carotid arteries. Calcific lesion along the lateral left frontal lobe measuring up to 16 mm compatible with a meningioma.   HEMORRHAGE: No acute intracranial hemorrhage. VENTRICLES and EXTRA-AXIAL SPACES: The ventricles, sulci and basal cisterns enlarged, concordant with parenchymal volume loss. EXTRACRANIAL SOFT TISSUES: Left frontal hematoma. PARANASAL SINUSES/MASTOIDS: The visualized paranasal sinuses and mastoid air cells are aerated. CALVARIUM: No depressed skull fracture. Regions of sclerotic and lucent foci in the calvarium similar to prior imaging.   OTHER FINDINGS: Lens replacement.   CERVICAL SPINE:   ALIGNMENT: Minimal anterolisthesis of C3 on C4 and C4 on C5. VERTEBRAE: No acute fracture. The bones are demineralized. Fusion of C1 and the occipital condyles. SPINAL CANAL: Degenerative changes facet and uncinate arthropathy, as well as  disc space narrowing and end plate hypertrophy. Moderate to severe left foraminal narrowing at C3-C4. Moderate left foraminal narrowing at C4-C5. No critical spinal canal stenosis. PREVERTEBRAL SOFT TISSUES: No prevertebral soft tissue swelling. LUNG APICES: Biapical pleuroparenchymal thickening, otherwise imaged portion of the lung apices are within normal limits.   OTHER FINDINGS: Atherosclerotic calcifications of the right vertebral artery and carotid arteries.       Left frontal hematoma. No acute intracranial hemorrhage or mass effect.   Probable left calcified meningioma.   No acute fracture or traumatic subluxation of the cervical spine.   Degenerative changes   MACRO: None.   Signed by: Gretchen aPl 12/11/2024 4:04 AM Dictation workstation:   [id number]         Assessment/Plan   Assessment & Plan  Closed fracture of left hip, initial encounter    Leukocytosis  Admit to [facility]  Appreciate ortho recs  NPO  Morphine  Bedrest  Add ck  Add urine cx  Check covid  Am CBC, BMP  Leukocytosis may be a result of trauma, will hold antibiotics at this time.    Prolonged QT  Daily EKGs    Suspected Lung nodule  Consider CT chest      Chronic conditions: Hypertension, headaches  Continue home medications as listed above    DVT prophylaxis  SCDs  No chemical prophylaxis until evaluated by Ortho      Missy Shaikh, APRN-CNP

## 2024-12-11 NOTE — ANESTHESIA PREPROCEDURE EVALUATION
Patient: Julissa Plasencia    Procedure Information       Date/Time: 12/11/24 1326    Procedure: ORIF LEFT HIP FRACTURE WITH C-ARM (Left: Hip) - Synthes short TFNA    Location: [facility] / [facility]    Surgeons: Ayush Junior, DO            Relevant Problems   Anesthesia (within normal limits)      Cardiac   (+) Hypertension, essential   (+) Rib pain      Neuro   (+) Occlusion and stenosis of left carotid artery       Clinical information reviewed:    Allergies  Meds               NPO Detail:  NPO/Void Status  Date of Last Liquid: 12/10/24  Date of Last Solid: 12/10/24         Physical Exam    Airway  Mallampati: I  TM distance: >3 FB  Neck ROM: full     Cardiovascular - normal exam     Dental    Pulmonary - normal exam     Abdominal   (+) scaphoid       Other findings: edentulous          Anesthesia Plan    History of general anesthesia?: no  History of complications of general anesthesia?: no    ASA 4     general   (Discussed anesthesia with pt.s son Ibrahima Plasencia in [place].  He wishes to rescind Lachel[facility] DNR status for the OR. )  The patient is not a current smoker.  Patient was previously instructed to abstain from smoking on day of procedure.  Patient did not smoke on day of procedure.    intravenous induction   Postoperative administration of opioids is intended.  Trial extubation is planned.  Anesthetic plan and risks discussed with patient.  Use of blood products discussed with patient who consented to blood products.

## 2024-12-11 NOTE — OP NOTE
ORIF LEFT HIP FRACTURE WITH C-ARM (L) Operative Note     Date: 2024  OR Location: [facility]    Name: Julissa Plasencia, : 3/1/1929, Age: 89 y.o., MRN: [id number], Sex: female    Diagnosis  Pre-op Diagnosis      * Closed fracture of left hip, initial encounter [S72.002A] Post-op Diagnosis     * Closed fracture of left hip, initial encounter [S72.002A]     Procedures  ORIF LEFT HIP FRACTURE WITH C-ARM  68968 - VA TX INTER/VA/SUBTRCHNTRIC FEM FX IMED IMPLTSCREW      Surgeons      * Ayush Junior - Primary    Resident/Fellow/Other Assistant:  Surgeons and Role:  * No surgeons found with a matching role *    Staff:   Adamulator: Grzegorz Blackub Person: Diana  Surgical Assistant: Daniel  Surgical Assistant: Conrado Bass Person: Alisha    Anesthesia Staff: Anesthesiologist: Colt Tinsley MD; Wally Rose MD  CRNA: CROW Cisneros-CRNA  C-AA: JACOB Alvarado    Procedure Summary  Anesthesia: General  ASA: IV  Estimated Blood Loss: 50mL  Intra-op Medications: * Intraprocedure medication information is unavailable because the case start and end events have not been set *           Anesthesia Record               Intraprocedure I/O Totals          Output    Est. Blood Loss 100 mL    Total Output 100 mL          Specimen: No specimens collected              Drains and/or Catheters: * None in log *    Tourniquet Times:         Implants:  Implants       Type Name Action Serial No.      Joint Hip NAIL, TFN ADV SHORT, 170MML, DIAMETER 10, 125 DEG - [id number] Implanted      Screw SCREW, TFNA, 85MM, STERILE - [id number] Implanted      Screw SCREW, LOCKING 5.0MM X 34MM TI - [id number] Implanted               Findings: Non-displaced left intertrochanteric hip fracture    Indications: Julissa Plasencia is an 89 y.o. female who is having surgery for Closed fracture of left hip, initial encounter [S72.002A].     The patient was seen in the preoperative area. The risks, benefits, complications, treatment options, non-operative  alternatives, expected recovery and outcomes were discussed with the patient. The possibilities of reaction to medication, pulmonary aspiration, injury to surrounding structures, bleeding, recurrent infection, the need for additional procedures, failure to diagnose a condition, and creating a complication requiring transfusion or operation were discussed with the patient. The patient concurred with the proposed plan, giving informed consent.  The site of surgery was properly noted/marked if necessary per policy. The patient has been actively warmed in preoperative area. Preoperative antibiotics have been ordered and given within 1 hours of incision. Venous thrombosis prophylaxis have been ordered including bilateral sequential compression devices and chemical prophylaxis    Procedure Details:   The patient  was correctly identified in the preoperative holding area.  The risks, benefits, reasonable expectations and outcomes were discussed with the patient and family.  Verbal and written consent were obtained prior to surgery.  The correct lower extremity was marked.  The patient was then taken to the operating room on the hospital bed in supine  position.  Appropriate sign in was done.  The patient was correctly identified and the hip had been marked preoperatively.  The patient was administered a general anesthetic in the  operating room, and then placed on the fracture table, and then placed on the fracture table with both feet placed in the fracture boots and legs placed in scissor position with no traction placed on the non-operative extremity.  Initial timeout was carried out.  Reduction maneuver was performed on the fracture table achieving appropriate length, alignment, and rotation of the fracture fragments.  This was confirmed on both AP and lateral fluoroscopy. The hip was sterilely  prepped and draped free in the usual fashion.  Appropriate time-out was done.  Superior  incision was made approximately 3 cm  in length just above the level of the greater trochanter.  This was  carried through skin and subcutaneous tissues.  The fascia was split using blunt dissection.  A guide pin was  placed on the tip of the greater trochanter.  Position confirmed in AP and lateral planes.  This was driven down to just below the fracture site.  The entrance reamer was used to  open the greater trochanter and proximal femur.  This was removed along with the guide pin.   A  170 x 10 millimeter 125 degree Synthes TFNA short nail was then passed through the greater troch down through the femoral canal.  This was done under C-arm visualization.  Using the guide attachment, a  second incision was made on the lateral aspect of the femur and a guide pin was passed  through the lateral cortex of the femur up into the femoral head.  Again position  confirmed in AP and lateral planes.  This was measured and a 85mm lag screw was selected and placed. The set screw was then completely tightened within the nail, then loosened 1/2 turn.  Compression was placed across the fracture site and the fracture was noted to reduce once again using C-arm visualization.  Using the distal locking screw guide attachment, another incision was made on the lateral aspect of the femur and the distal locking hole was drilled.  The appropriately sized distal locking screw was placed and visualized using C-arm.   The entrance apparatus and alignment  guide were then removed.  The femur was x-rayed and at the hip, AP and lateral views.  The incisions were copiously irrigated with normal saline.  The fascia was closed with 0  Vicryl ligature and the subcutaneous tissue with close with 2-0 Vicryl ligature.  The skin was finally closed  using staples.  Mepilex dressings were then applied over the incisions.  The patient was then awoken by anesthesia and transferred back to her hospital bed.  The patient was then taken to the recovery room in stable condition.  The patient  tolerated the procedure well.  No drains.  Sponge count was correct.  No  specimen.  Case was clean and urgent.  Postop condition stable.      Complications:  None; patient tolerated the procedure well.    Disposition: PACU - hemodynamically stable.  Condition: stable       Task Performed by RNFA or Surgical Assistant:  Positioning, prepping, instrumentation, closure       Additional Details: None    Attending Attestation: I was present and scrubbed for the entire procedure.      Post-operative Plan:  Dressing: Aquacel x 7 days post op  Abx: Ancef x 24 hrs post op.    Post op DVT proph: ASA 81mg BID x 28 days, SCDs.   Hodges: NA  Activity: WBAT LLE, AROM as tolerated.  PT/OT  D/C Planning for SNF vs home w/ home health  Follow-up in 2 weeks as outpatient for repeat radiographs and staple removal.      Ayush Junior, DO  Orthopaedic Surgery  Sports Medicine & Shoulder  [facility]   [phone]

## 2024-12-11 NOTE — ED TRIAGE NOTES
"Pt arrived via EMS. Reported fall at nursing [place] ( [facility]  ) at approximately 0230. Pt reportedly fell on left side and hit head. Nursing home believes that she had no LOC. She is A/O x 1, but winces when her left hip is touched. Sh has a laceration to the left side of her forehead. When I asked about her pain, she said she is \"in a little bit\". Pt is not on blood thinners. Pt is DNR-[facility], paperwork was brought in with EMS.   "

## 2024-12-11 NOTE — PROGRESS NOTES
Pharmacy Medication History Review    Julissa Plasencia is a 89 y.o. female admitted for Closed fracture of left hip, initial encounter. Pharmacy reviewed the patient's [id number]-mhemjfwqs medications and allergies for accuracy.    The list below reflects the updated PTA list.   Prior to Admission Medications   Prescriptions Last Dose Informant   acetaminophen (Tylenol) 325 mg tablet Unknown Other   Sig: Take 2 tablets (650 mg) by mouth every 6 hours.   acetaminophen (Tylenol) 500 mg tablet Unknown Other   Sig: Take 2 tablets (1,000 mg) by mouth every 6 hours if needed for mild pain (1 - 3).   cholecalciferol (Vitamin D-3) 50 mcg (2,000 unit) capsule Unknown Other   Sig: Take 1 capsule (50 mcg) by mouth early in the morning..   diphenhydrAMINE-acetaminophen (Tylenol PM)  mg per tablet Unknown Other   Sig: Take 1 tablet by mouth once daily at bedtime.   melatonin 5 mg tablet Unknown Other   Sig: Take 1 tablet (5 mg) by mouth once daily at bedtime.   metoprolol succinate XL (Toprol-XL) 50 mg 24 hr tablet Unknown Other   Sig: TAKE 1 TABLET BY MOUTH ONCE  DAILY   polyethylene glycol (Glycolax, Miralax) 17 gram packet Unknown Other   Sig: Take 17 g by mouth once daily.   Patient taking differently: Take 17 g by mouth once daily as needed.      Facility-Administered Medications: None        The list below reflects the updated allergy list. Please review each documented allergy for additional clarification and justification.  Allergies  Reviewed by Noah Fields RN on 12/11/2024        Severity Reactions Comments    Lisinopril Low Cough             Patient was unable to be assessed for M2B at discharge.     Sources:   Facility interview - with BRITTANY Tamayo at [facility] on 12/11/24 at 6AM    Medications ADDED:  Acetaminphen 500mg  Tylenol PM  Melatonin 5mg  Medications CHANGED:  Acetaminophen 325mg - changed frequency from Q6h PRN to 6hh  Polyethylene glycol - frequency changed from every day to Qday  "to Qday PRN  Medications REMOVED/MARKED NOT TAKING:   None     Additional Comments:  None    Ariana Vargas, PharmD  Transitions of Care Pharmacist  12/11/24     Secure Chat preferred   If no response call [phone] or Vocera \"Med Rec\"    "

## 2024-12-11 NOTE — CARE PLAN
The patient's goals for the shift include      The clinical goals for the shift include pain mnagement      Problem: Pain - Adult  Goal: Verbalizes/displays adequate comfort level or baseline comfort level  Outcome: Progressing     Problem: Safety - Adult  Goal: Free from fall injury  Outcome: Progressing     Problem: Discharge Planning  Goal: Discharge to home or other facility with appropriate resources  Outcome: Progressing     Problem: Chronic Conditions and Co-morbidities  Goal: Patient's chronic conditions and co-morbidity symptoms are monitored and maintained or improved  Outcome: Progressing     Problem: Skin  Goal: Decreased wound size/increased tissue granulation at next dressing change  Outcome: Progressing  Flowsheets (Taken 12/11/2024 1119)  Decreased wound size/increased tissue granulation at next dressing change: Promote sleep for wound healing  Goal: Participates in plan/prevention/treatment measures  Outcome: Progressing  Flowsheets (Taken 12/11/2024 1119)  Participates in plan/prevention/treatment measures: Elevate heels  Goal: Promote skin healing  Outcome: Progressing  Flowsheets (Taken 12/11/2024 1119)  Promote skin healing: Turn/reposition every 2 hours/use positioning/transfer devices

## 2024-12-11 NOTE — ED PROVIDER NOTES
HPI   Chief Complaint   Patient presents with    Fall       This is a 89yF BIBEMS from SNF for unwitnessed fall.  Patient fell in the bathroom onto her L side and c/o pain in her L hip.  She also clearly hit her head and has a large hematoma and abrasion on her L anterolateral forehead.  Not on a/c.  She denies LOC but isn't sure what caused her to fall.      History provided by:  Patient  History limited by:  Age   used: No            Patient History   Past Medical History:   Diagnosis Date    Abnormal uterine and vaginal bleeding, unspecified 12/28/2016    Abnormal vaginal bleeding    Biliary acute pancreatitis without necrosis or infection ([facility]) 02/02/2017    Acute biliary pancreatitis without infection or necrosis    Brain tumor ([facility])     Motion sickness, initial encounter 10/11/2016    Sea sickness    Other female genital prolapse 06/13/2017    Pelvic relaxation    Other specified cough 12/30/2014    Cough due to ACE inhibitor    Pain in right shoulder 06/01/2017    Acute pain of right shoulder    Personal history of other diseases of the digestive system 06/05/2017    History of acute pancreatitis    Personal history of other diseases of the digestive system 03/06/2017    History of acute pancreatitis    Personal history of urinary (tract) infections 03/21/2017    History of urinary tract infection     Past Surgical History:   Procedure Laterality Date    COLONOSCOPY  08/12/2015    Complete Colonoscopy    ESOPHAGOGASTRODUODENOSCOPY  12/08/2015    Diagnostic Esophagogastroduodenoscopy     No family history on file.  Social History     Tobacco Use    Smoking status: Former     Types: Cigarettes    Smokeless tobacco: Never   Substance Use Topics    Alcohol use: Never    Drug use: Never       Physical Exam   ED Triage Vitals [12/11/24 0258]   Temperature Heart Rate Respirations BP   35.8 °C (96.4 °F) 84 16 (!) 186/79      Pulse Ox Temp src Heart Rate Source Patient Position   97 % --  Monitor --      BP Location FiO2 (%)     Left arm --       Physical Exam  Vitals and nursing note reviewed.   Constitutional:       General: She is not in acute distress.     Appearance: She is not ill-appearing, toxic-appearing or diaphoretic.   HENT:      Head:      Comments: +hematoma at left lateral forehead with overlying abrasion and dried blood but no clear laceration     Nose: Nose normal. No congestion or rhinorrhea.      Mouth/Throat:      Mouth: Mucous membranes are moist.   Eyes:      General: No scleral icterus.        Right eye: No discharge.         Left eye: No discharge.      Extraocular Movements: Extraocular movements intact.      Conjunctiva/sclera: Conjunctivae normal.   Cardiovascular:      Rate and Rhythm: Normal rate and regular rhythm.      Heart sounds: No murmur heard.     No friction rub. No gallop.   Pulmonary:      Effort: Pulmonary effort is normal. No respiratory distress.      Breath sounds: Normal breath sounds. No stridor. No wheezing, rhonchi or rales.   Abdominal:      General: There is no distension.      Palpations: Abdomen is soft.      Tenderness: There is no abdominal tenderness. There is no guarding or rebound.   Musculoskeletal:         General: Tenderness and signs of injury present. No swelling or deformity. Normal range of motion.      Cervical back: Normal range of motion and neck supple. No rigidity or tenderness.   Skin:     General: Skin is warm and dry.      Coloration: Skin is not jaundiced or pale.      Findings: No bruising, erythema, lesion or rash.   Neurological:      General: No focal deficit present.      Mental Status: She is alert and oriented to person, place, and time.      Cranial Nerves: No cranial nerve deficit.      Sensory: No sensory deficit.      Motor: No weakness.      Coordination: Coordination normal.   Psychiatric:         Mood and Affect: Mood normal.         Behavior: Behavior normal.           ED Course & MDM   ED Course as of 12/13/24  0026   Wed Dec 11, 2024   0406 EKG inter by me normal sinus rhythm questionable incomplete right bundle branch block prolonged QTc no STEMI [EK]   0523 Labs reviewed, notable for leukocytosis WBC 20.7 mild hyponatremia and hypochloremia without NURY [EK]      ED Course User Index  [EK] Elyse H Klerman, MD         Diagnoses as of 12/13/24 0026   Closed fracture of left hip, initial encounter   Hyponatremia   Leukocytosis, unspecified type                 No data recorded     Anjana Coma Scale Score: 15 (12/11/24 0307 : Noah Fields, BRITTANY)                           Medical Decision Making  89yF presents with unwitnessed fall, with forehead hematoma/abrasion and L hip pain.  Patient hypertensive on ED arrival, likely at least partially due to pain, but without resp distress, fever or bleeding wounds.  EKG interpreted by me without STEMI.  Labs with leukocytosis and mild hyponatremia.  Xrays interpreted by me with L hip fracture, confirmed by radiology but no head injury.  Patient discussed with PCP and admitted for further care.    Amount and/or Complexity of Data Reviewed  Labs: ordered. Decision-making details documented in ED Course.  Radiology: ordered and independent interpretation performed.  ECG/medicine tests: ordered and independent interpretation performed. Decision-making details documented in ED Course.    Risk  Decision regarding hospitalization.        Procedure  Procedures     Elyse H Klerman, MD  12/13/24 0030

## 2024-12-11 NOTE — H&P
History Of Present Illness  Julissa Plasencia is a 89 y.o. female    with a past medical history of hypertension, confusion, headaches, weakness, falls, and pelvic fracture who presented today after a fall.  HPI is limited due to patient's confusion.  She notes she does recall falling.  Reports she ambulated to the bathroom and it was wet on the floor and she slipped.  She reports she was on the floor for a time and was calling for help.  She admits to head injury but does not recall any dizziness prior to the fall.  It is difficult to ascertain if patient has been ill recently.  She complains of severe left hip pain.  Medical record indicates patient was sent in from nursing home after she was found down on the floor with a head injury so 911 was called.    In the ED lab work, EKG, chest x-ray, head CT, C-spine CT, and hip x-ray were performed. Labs revealed sodium 132 (appears chronic), chloride 94 (105 on July 2, 2024), troponins negative x 2, white blood cell count 20.7, neutrophils 17.81, monocytes 1.14, UA revealed 2+ blood, 1+ bacteria, and 6-10 red blood cells. Head CT was notable for left frontal hematoma, probable left calcified meningioma.  C-spine CT was without acute process.  Chest x-ray noted a possible nodule in the right infrahilar region.  Hip x-ray revealed nondisplaced left intertrochanteric hip fracture.  EKG revealed sinus rhythm, rate 81, PAC, incomplete right bundle branch block, and prolonged QT with QTc 517, unchanged from previous EKG on November 9, 2023.  She arrived with a blood pressure of 186/79, her vitals were otherwise stable.  She was given Tylenol and admitted for further evaluation and treatment under the care of Dr. Andrew Alamo.    Limited review of systems performed due to patient's confusion.    Past Medical History  Past Medical History:   Diagnosis Date    Abnormal uterine and vaginal bleeding, unspecified 12/28/2016    Abnormal vaginal bleeding    Biliary acute  pancreatitis without necrosis or infection ([facility]) 02/02/2017    Acute biliary pancreatitis without infection or necrosis    Brain tumor ([facility])     Motion sickness, initial encounter 10/11/2016    Sea sickness    Other female genital prolapse 06/13/2017    Pelvic relaxation    Other specified cough 12/30/2014    Cough due to ACE inhibitor    Pain in right shoulder 06/01/2017    Acute pain of right shoulder    Personal history of other diseases of the digestive system 06/05/2017    History of acute pancreatitis    Personal history of other diseases of the digestive system 03/06/2017    History of acute pancreatitis    Personal history of urinary (tract) infections 03/21/2017    History of urinary tract infection       Surgical History  Past Surgical History:   Procedure Laterality Date    COLONOSCOPY  08/12/2015    Complete Colonoscopy    ESOPHAGOGASTRODUODENOSCOPY  12/08/2015    Diagnostic Esophagogastroduodenoscopy        Social History  She reports that she has quit smoking. Her smoking use included cigarettes. She has never used smokeless tobacco. She reports that she does not drink alcohol and does not use drugs.    Family History  MI, diabetes  Allergies  Lisinopril     Physical Exam  Constitutional:       Comments: thin   HENT:      Head: Normocephalic.      Ears:      Comments: Left forehead hematoma/abrasion noted     Nose: Nose normal.      Mouth/Throat:      Mouth: Mucous membranes are dry.   Eyes:      Conjunctiva/sclera: Conjunctivae normal.   Cardiovascular:      Rate and Rhythm: Normal rate and regular rhythm.      Heart sounds: Normal heart sounds.   Pulmonary:      Effort: Pulmonary effort is normal.      Breath sounds: Normal breath sounds.      Comments: Anteriorly  Abdominal:      General: Bowel sounds are normal.      Palpations: Abdomen is soft.      Tenderness: There is no abdominal tenderness.   Musculoskeletal:      Cervical back: Neck supple.      Comments: Decreased range of motion to  "left hip, circulation movement sensation intact to left toes   Skin:     General: Skin is warm.      Comments: See head assessment   Neurological:      Mental Status: She is alert. Mental status is at baseline.   Psychiatric:         Mood and Affect: Mood normal.          Last Recorded Vitals  Blood pressure (!) 147/92, pulse 76, temperature 35.8 °C (96.4 °F), resp. rate (!) 33, height 1.6 m (5' 3\"), weight 45.4 kg (100 lb), SpO2 94%.    Relevant Results    No current facility-administered medications on file prior to encounter.     Current Outpatient Medications on File Prior to Encounter   Medication Sig Dispense Refill    metoprolol succinate XL (Toprol-XL) 50 mg 24 hr tablet TAKE 1 TABLET BY MOUTH ONCE  DAILY 90 tablet 3    acetaminophen (Tylenol) 325 mg tablet Take 2 tablets (650 mg) by mouth every 6 hours.      acetaminophen (Tylenol) 500 mg tablet Take 2 tablets (1,000 mg) by mouth every 6 hours if needed for mild pain (1 - 3).      cholecalciferol (Vitamin D-3) 50 mcg (2,000 unit) capsule Take 1 capsule (50 mcg) by mouth early in the morning..      diphenhydrAMINE-acetaminophen (Tylenol PM)  mg per tablet Take 1 tablet by mouth once daily at bedtime.      melatonin 5 mg tablet Take 1 tablet (5 mg) by mouth once daily at bedtime.      polyethylene glycol (Glycolax, Miralax) 17 gram packet Take 17 g by mouth once daily. (Patient taking differently: Take 17 g by mouth once daily as needed.)          Results for orders placed or performed during the hospital encounter of 12/11/24 (from the past 24 hours)   CBC and Auto Differential   Result Value Ref Range    WBC 20.7 (H) 4.4 - 11.3 x10*3/uL    nRBC 0.0 0.0 - 0.0 /100 WBCs    RBC 4.03 4.00 - 5.20 x10*6/uL    Hemoglobin 12.7 12.0 - 16.0 g/dL    Hematocrit 37.6 36.0 - 46.0 %    MCV 93 80 - 100 fL    MCH 31.5 26.0 - 34.0 pg    MCHC 33.8 32.0 - 36.0 g/dL    RDW 13.2 11.5 - 14.5 %    Platelets 296 150 - 450 x10*3/uL    Neutrophils % 86.1 40.0 - 80.0 %    " Immature Granulocytes %, Automated 0.7 0.0 - 0.9 %    Lymphocytes % 7.2 13.0 - 44.0 %    Monocytes % 5.5 2.0 - 10.0 %    Eosinophils % 0.2 0.0 - 6.0 %    Basophils % 0.3 0.0 - 2.0 %    Neutrophils Absolute 17.81 (H) 1.60 - 5.50 x10*3/uL    Immature Granulocytes Absolute, Automated 0.15 0.00 - 0.50 x10*3/uL    Lymphocytes Absolute 1.48 0.80 - 3.00 x10*3/uL    Monocytes Absolute 1.14 (H) 0.05 - 0.80 x10*3/uL    Eosinophils Absolute 0.04 0.00 - 0.40 x10*3/uL    Basophils Absolute 0.06 0.00 - 0.10 x10*3/uL   Comprehensive metabolic panel   Result Value Ref Range    Glucose 105 (H) 74 - 99 mg/dL    Sodium 132 (L) 136 - 145 mmol/L    Potassium 4.1 3.5 - 5.3 mmol/L    Chloride 94 (L) 98 - 107 mmol/L    Bicarbonate 30 21 - 32 mmol/L    Anion Gap 12 10 - 20 mmol/L    Urea Nitrogen 11 6 - 23 mg/dL    Creatinine 0.52 0.50 - 1.05 mg/dL    eGFR 86 >60 mL/min/1.73m*2    Calcium 9.8 8.6 - 10.3 mg/dL    Albumin 4.3 3.4 - 5.0 g/dL    Alkaline Phosphatase 132 33 - 136 U/L    Total Protein 7.5 6.4 - 8.2 g/dL    AST 16 9 - 39 U/L    Bilirubin, Total 0.5 0.0 - 1.2 mg/dL    ALT 11 7 - 45 U/L   Protime-INR   Result Value Ref Range    Protime 11.8 9.8 - 12.8 seconds    INR 1.0 0.9 - 1.1   aPTT   Result Value Ref Range    aPTT 32 27 - 38 seconds   Type And Screen   Result Value Ref Range    ABO TYPE O     Rh TYPE POS     ANTIBODY SCREEN NEG    Troponin I, High Sensitivity, Initial   Result Value Ref Range    Troponin I, High Sensitivity 8 0 - 13 ng/L   Troponin, High Sensitivity, 1 Hour   Result Value Ref Range    Troponin I, High Sensitivity 7 0 - 13 ng/L   VERIFY ABO/Rh Group Test   Result Value Ref Range    ABO TYPE O     Rh TYPE POS    Creatine Kinase   Result Value Ref Range    Creatine Kinase 38 0 - 215 U/L   Urinalysis with Reflex Culture and Microscopic   Result Value Ref Range    Color, Urine Light-Yellow Light-Yellow, Yellow, Dark-Yellow    Appearance, Urine Clear Clear    Specific Gravity, Urine 1.012 1.005 - 1.035    pH, Urine  6.5 5.0, 5.5, 6.0, 6.5, 7.0, 7.5, 8.0    Protein, Urine NEGATIVE NEGATIVE, 10 (TRACE), 20 (TRACE) mg/dL    Glucose, Urine Normal Normal mg/dL    Blood, Urine 0.2 (2+) (A) NEGATIVE    Ketones, Urine NEGATIVE NEGATIVE mg/dL    Bilirubin, Urine NEGATIVE NEGATIVE    Urobilinogen, Urine Normal Normal mg/dL    Nitrite, Urine NEGATIVE NEGATIVE    Leukocyte Esterase, Urine NEGATIVE NEGATIVE   Urinalysis Microscopic   Result Value Ref Range    WBC, Urine 1-5 1-5, NONE /HPF    RBC, Urine 6-10 (A) NONE, 1-2, 3-5 /HPF    Bacteria, Urine 1+ (A) NONE SEEN /HPF   Sars-CoV-2 PCR   Result Value Ref Range    Coronavirus 2019, PCR Not Detected Not Detected        XR hip left with pelvis when performed 2 or 3 views    Result Date: 12/11/2024  Interpreted By:  Daniel Painter, STUDY: XR HIP LEFT WITH PELVIS WHEN PERFORMED 2 OR 3 VIEWS; ;  12/11/2024 4:38 am   INDICATION: Signs/Symptoms:fall, L hip pain.     COMPARISON: None.   ACCESSION NUMBER(S): [id number]   ORDERING CLINICIAN: ELYSE KLERMAN   FINDINGS: Nondisplaced intertrochanteric left hip fracture seen. This is best visualized on the lateral view. Mild bilateral hip arthritic degenerative changes.       Nondisplaced left intertrochanteric hip fracture.     MACRO: None   Signed by: Daniel Painter 12/11/2024 5:02 AM Dictation workstation:   [id number]    XR chest 1 view    Result Date: 12/11/2024  Interpreted By:  Daniel Painter, STUDY: XR CHEST 1 VIEW;  12/11/2024 4:38 am   INDICATION: Signs/Symptoms:fall.   COMPARISON: 07/02/2024   ACCESSION NUMBER(S): [id number]   ORDERING CLINICIAN: ELYSE KLERMAN   FINDINGS: Mild cardiomegaly.   Some increased interstitial markings appear unchanged which may relate to underlying COPD.   Potential developing nodule seen projecting right infrahilar region measuring 15 mm.   No additional significant new consolidation.   No significant pneumothorax.       Potential developing pulmonary nodule right infrahilar region. Recommend CT imaging for  further evaluation.   MACRO: None   Signed by: Daniel Guillermola 12/11/2024 5:01 AM Dictation workstation:   [id number]    CT head wo IV contrast    Result Date: 12/11/2024  Interpreted By:  Gretchen Pal, STUDY: CT HEAD WO IV CONTRAST; CT CERVICAL SPINE WO IV CONTRAST;  12/11/2024 3:47 am   INDICATION: Signs/Symptoms:fall, hit head.   COMPARISON: 11/09/2023   ACCESSION NUMBER(S): [id number]; [id number]   ORDERING CLINICIAN: ELYSE KLERMAN   TECHNIQUE: Noncontrast CT images of head. Axial noncontrast CT images of the cervical spine with coronal and sagittal reconstructed images.   FINDINGS: BRAIN PARENCHYMA: Generalized parenchymal volume loss noted with concordant ventricular enlargement. Non-specific white matter changes noted, which may be related to small vessel disease.  No mass effect or midline shift. Calcific lucency of the carotid arteries. Calcific lesion along the lateral left frontal lobe measuring up to 16 mm compatible with a meningioma.   HEMORRHAGE: No acute intracranial hemorrhage. VENTRICLES and EXTRA-AXIAL SPACES: The ventricles, sulci and basal cisterns enlarged, concordant with parenchymal volume loss. EXTRACRANIAL SOFT TISSUES: Left frontal hematoma. PARANASAL SINUSES/MASTOIDS: The visualized paranasal sinuses and mastoid air cells are aerated. CALVARIUM: No depressed skull fracture. Regions of sclerotic and lucent foci in the calvarium similar to prior imaging.   OTHER FINDINGS: Lens replacement.   CERVICAL SPINE:   ALIGNMENT: Minimal anterolisthesis of C3 on C4 and C4 on C5. VERTEBRAE: No acute fracture. The bones are demineralized. Fusion of C1 and the occipital condyles. SPINAL CANAL: Degenerative changes facet and uncinate arthropathy, as well as disc space narrowing and end plate hypertrophy. Moderate to severe left foraminal narrowing at C3-C4. Moderate left foraminal narrowing at C4-C5. No critical spinal canal stenosis. PREVERTEBRAL SOFT TISSUES: No prevertebral soft tissue swelling.  LUNG APICES: Biapical pleuroparenchymal thickening, otherwise imaged portion of the lung apices are within normal limits.   OTHER FINDINGS: Atherosclerotic calcifications of the right vertebral artery and carotid arteries.       Left frontal hematoma. No acute intracranial hemorrhage or mass effect.   Probable left calcified meningioma.   No acute fracture or traumatic subluxation of the cervical spine.   Degenerative changes   MACRO: None.   Signed by: Gretchen Pal 12/11/2024 4:04 AM Dictation workstation:   [id number]    CT cervical spine wo IV contrast    Result Date: 12/11/2024  Interpreted By:  Gretchen Pal, STUDY: CT HEAD WO IV CONTRAST; CT CERVICAL SPINE WO IV CONTRAST;  12/11/2024 3:47 am   INDICATION: Signs/Symptoms:fall, hit head.   COMPARISON: 11/09/2023   ACCESSION NUMBER(S): [id number]; [id number]   ORDERING CLINICIAN: ELYSE KLERMAN   TECHNIQUE: Noncontrast CT images of head. Axial noncontrast CT images of the cervical spine with coronal and sagittal reconstructed images.   FINDINGS: BRAIN PARENCHYMA: Generalized parenchymal volume loss noted with concordant ventricular enlargement. Non-specific white matter changes noted, which may be related to small vessel disease.  No mass effect or midline shift. Calcific lucency of the carotid arteries. Calcific lesion along the lateral left frontal lobe measuring up to 16 mm compatible with a meningioma.   HEMORRHAGE: No acute intracranial hemorrhage. VENTRICLES and EXTRA-AXIAL SPACES: The ventricles, sulci and basal cisterns enlarged, concordant with parenchymal volume loss. EXTRACRANIAL SOFT TISSUES: Left frontal hematoma. PARANASAL SINUSES/MASTOIDS: The visualized paranasal sinuses and mastoid air cells are aerated. CALVARIUM: No depressed skull fracture. Regions of sclerotic and lucent foci in the calvarium similar to prior imaging.   OTHER FINDINGS: Lens replacement.   CERVICAL SPINE:   ALIGNMENT: Minimal anterolisthesis of C3 on C4 and C4 on C5.  VERTEBRAE: No acute fracture. The bones are demineralized. Fusion of C1 and the occipital condyles. SPINAL CANAL: Degenerative changes facet and uncinate arthropathy, as well as disc space narrowing and end plate hypertrophy. Moderate to severe left foraminal narrowing at C3-C4. Moderate left foraminal narrowing at C4-C5. No critical spinal canal stenosis. PREVERTEBRAL SOFT TISSUES: No prevertebral soft tissue swelling. LUNG APICES: Biapical pleuroparenchymal thickening, otherwise imaged portion of the lung apices are within normal limits.   OTHER FINDINGS: Atherosclerotic calcifications of the right vertebral artery and carotid arteries.       Left frontal hematoma. No acute intracranial hemorrhage or mass effect.   Probable left calcified meningioma.   No acute fracture or traumatic subluxation of the cervical spine.   Degenerative changes   MACRO: None.   Signed by: Gretchen Pal 12/11/2024 4:04 AM Dictation workstation:   [id number]         Assessment/Plan   Assessment & Plan  Closed fracture of left hip, initial encounter    Left hip fracture  Leukocytosis  Admit to [facility]  Appreciate ortho recs  NPO  Morphine  Bedrest  Add ck  Add urine cx  Check covid  Am CBC, BMP  Leukocytosis may be a result of trauma, will hold antibiotics at this time.    Prolonged QT  Daily EKGs    Suspected Lung nodule  Consider CT chest      Chronic conditions: Hypertension, headaches  Continue home medications as listed above    DVT prophylaxis  SCDs  No chemical prophylaxis until evaluated by Ortho    Patient's Revised Cardiac Risk Index is 0. CXR reviewed, CT chest ordered but overall stable from respiratory stand point. Patient is medically cleared for surgery.     Andrew Alamo DO

## 2024-12-11 NOTE — DISCHARGE INSTRUCTIONS
Hip Fracture Surgery - Post-operative instructions    Activity and Weightbearing Status  You will need to use crutches or a walker, but you are allowed full weight bearing on your operative hip. Let pain be a guide, keeping in mind that you just had surgery.     Assist devices   You will be discharged from the hospital with a walker, crutches or a cane depending on how well you walk with physical therapy as an inpatient. You will typically use these aids anywhere from a few days to a few weeks and stop using them when you are stable and strong on your feet. Some people who have used these devices for years may require prolonged use for reasons unrelated to the surgery.     Dressing Changes  Dressings must be kept clean.  You will be sent home with a special dressing over you incision.  This must stay in place for 7 days.  To remove after 7 days pull down on a corner in line with the dressing to break the adhesive and then remove normally.  You may leave open to air once this is removed.  If this area is sensitive, you may cover with an a small dressing of 4x4s and tape/tegaderm. You may not take a bath or go swimming until the incision is completely healed. If you had staples placed and you are still at skilled nursing facility 3 weeks after surgery, they can be removed at that time.     Swelling and bruising   After surgery swelling and bruising of the operative site is normal and will gradually decrease as the days pass. If activity and exercise worsens your swelling, take time to lie down and apply ice over the area of swelling.    Blood clot prevention  You will be prescribed medication to lower your risk of forming blood clots. This medication is important to take until the prescription is finished. In addition, being active and performing your exercises properly can minimize your risk  Aspirin 81 mg twice daily for 4 weeks after surgery    Diet   Typically, with adequate protein intake for promotion of  healing, there are no special diet restrictions. Make sure you eat a well-balanced meal, drink plenty of fluids and incorporate fiber into your diet as oral pain medications have a tendency to cause constipation. It is also a good idea to take a stool softener such as Colace or Citrucal daily until your system becomes regular after surgery.    Medication - Relieving pain   It is normal to have some pain after surgery. Pain medications have been prescribed and enough pain pills have been given to cover you beyond your next office visit. It should be noted that pain medications take about one-half hour to start working, so take them prior to the pain becoming severe. DO NOT drink alcohol while taking prescribed pain medication. It is dangerous and illegal to drive while taking pain medicine. If you need a refill on pain medication before your first scheduled appointment, please call our office during regular office hours. These refills cannot be called into a pharmacy so they cannot be filled after hours or over the weekend. The office needs advanced notice to accommodate these requests.     Activity  After your hip surgery it is of utmost importance you follow the exercises given from your physical therapist. These will help you progress more quickly. The more active you are the less likely you are to get a blood clot. Let pain be your guide to your activity level.       Call the office if you notice any of the following:   Fever above 101° Fahrenheit   Persistent swelling, redness, or uncontrolled pain in the surgical area   Persistent bleeding or drainage from the wound   Severe calf pain or tenderness   You are unable to do the exercises    Post-operative appointment   You should follow up with Dr. Junior in 2 weeks following your operation if you have gone home from the hospital and approximately 3-4 weeks if you have been in a Skilled Nursing Facility for rehabilitation. If you remain in a nursing facility at the  4 week [place] you should have x-rays, AP/Lat of affected hip to include entire prosthesis, taken and sent to your surgeon's office. As long as x-rays are taken at the nursing facility and reviewed by your surgeon then you do not need to come into the office for an appointment.  Please call to confirm appointment. Do not hesitate to call with any questions or concerns.    Ayush Junior, DO  Orthopaedic Surgery  Sports Medicine & Shoulder  [facility]   [place] Office - [phone]  [place] Office - [phone]

## 2024-12-11 NOTE — ANESTHESIA POSTPROCEDURE EVALUATION
Patient: Julissa Plasencia    Procedure Summary       Date: 12/11/24 Room / Location: [facility] OR 06 / Virtual [facility] OR    Anesthesia Start: 1421 Anesthesia Stop: 1543    Procedure: ORIF LEFT HIP FRACTURE WITH C-ARM (Left: Hip) Diagnosis:       Closed fracture of left hip, initial encounter      (Closed fracture of left hip, initial encounter [S72.002A])    Surgeons: Ayush Junior DO Responsible Provider: Colt Tinsley MD    Anesthesia Type: general ASA Status: 4            Anesthesia Type: general    Vitals Value Taken Time   /75 12/11/24 1540   Temp 37.6 12/11/24 1543   Pulse 103 12/11/24 1541   Resp 12 12/11/24 1543   SpO2 100 % 12/11/24 1541   Vitals shown include unfiled device data.    Anesthesia Post Evaluation    Patient location during evaluation: PACU  Patient participation: complete - patient cannot participate  Level of consciousness: responsive to painful stimuli and sedated  Pain score: 0  Pain management: adequate  Airway patency: patent  Cardiovascular status: acceptable, hypertensive and blood pressure returned to baseline  Respiratory status: acceptable and face mask  Hydration status: acceptable  Postoperative Nausea and Vomiting: none      There were no known notable events for this encounter.

## 2024-12-11 NOTE — CONSULTS
ORTHOPAEDIC SURGERY CONSULT NOTE      Patient Name: Julissa Plasencia  MRN: [id number]  Admission Date: 12/11/2024  Date of Evaluation:  December 11, 2024  Time of Evaluation: 10:09 AM      Reason for Consult: Hip pain  Requesting Physician: [facility]  Primary Care Physician: Andrew Alamo DO    IMPRESSION  1.  Left Intertrochanteric hip fracture    PLAN:  Admission status: Admitted to primary team.  Test(s)/Imaging: X-rays reviewed.  Intervention: None.  PT/OT: NWB LLE.  Diet: Per primary. NPO.  Pain Control: Per primary  DVT Prophylaxis: Hold for OR.  Disposition  - Plan for OR likely today for ORIF left hip - case request order placed  - NPO   - Hodges to be placed   - 2g Ancef on hold to OR   - Pre-op labs: UA, CBC, CMP, PT/INR/ Type and Screen   - Full length femur films     We discussed the pathoanatomy of hip fractures at length and treatment options including both conservative and surgical.  We discussed the benefits of early weightbearing following surgical fixation.  I did recommend surgical fixation with trochanteric femoral nail given the fracture pattern.  The risks and benefits of surgery were reviewed. The risks included but are not limited to the following: infection, blood loss, damage to surrounding structures, loss of function, DVT,  PE, nonunion, malunion, hardware failure, the need for repeat operative interventions and the risks of anesthesia. The patient understands and accepts these risks.    Phone consent was obtained from son, Momo Plasencia, who is medical POA.  Will lift DNR for perioperative period.     SUBJECTIVE  Ms. Plasencia is a 89 y.o. female with a history of HTN, dementia, HA, falls, weakness who presents for left hip pain. History limited 2/2 dementia, but reported fall at facility, did hit head. Patient denies low back pain, numbness/tingling of the affected leg, or injury to other extremities. Patient denies use of bisphosphonates, chronic steroids or anticoagulation.       Past Medical  History:   Past Medical History:   Diagnosis Date    Abnormal uterine and vaginal bleeding, unspecified 12/28/2016    Abnormal vaginal bleeding    Biliary acute pancreatitis without necrosis or infection ([facility]) 02/02/2017    Acute biliary pancreatitis without infection or necrosis    Brain tumor ([facility])     Motion sickness, initial encounter 10/11/2016    Sea sickness    Other female genital prolapse 06/13/2017    Pelvic relaxation    Other specified cough 12/30/2014    Cough due to ACE inhibitor    Pain in right shoulder 06/01/2017    Acute pain of right shoulder    Personal history of other diseases of the digestive system 06/05/2017    History of acute pancreatitis    Personal history of other diseases of the digestive system 03/06/2017    History of acute pancreatitis    Personal history of urinary (tract) infections 03/21/2017    History of urinary tract infection     Past Surgical History:   Past Surgical History:   Procedure Laterality Date    COLONOSCOPY  08/12/2015    Complete Colonoscopy    ESOPHAGOGASTRODUODENOSCOPY  12/08/2015    Diagnostic Esophagogastroduodenoscopy     Family History: No family history on file.  Social History:   Social History     Tobacco Use    Smoking status: Former     Types: Cigarettes    Smokeless tobacco: Never   Substance Use Topics    Alcohol use: Never    Drug use: Never     Medications:  Prior to Admission Medications:  (Not in a hospital admission)     Scheduled medications  acetaminophen, 650 mg, oral, q6h  melatonin, 5 mg, oral, Nightly  metoprolol succinate XL, 50 mg, oral, Daily  OLANZapine, , ,   polyethylene glycol, 17 g, oral, Daily      Continuous medications     PRN medications  PRN medications: acetaminophen, diphenhydrAMINE, morphine, OLANZapine, OLANZapine **OR** OLANZapine    Current Allergies:   Allergies as of 12/11/2024 - Reviewed 12/11/2024   Allergen Reaction Noted    Lisinopril Cough 11/09/2023       Complete Review of Systems:   "  NA    OBJECTIVE  Physical Exam:   Patient Vitals for the past 24 hrs:   BP Temp Pulse Resp SpO2 Height Weight   12/11/24 0930 (!) 147/92 -- 76 -- 94 % -- --   12/11/24 0830 (!) 145/99 -- 81 (!) 33 97 % -- --   12/11/24 0800 153/67 -- 79 (!) 30 96 % -- --   12/11/24 0531 137/60 -- 73 16 98 % -- --   12/11/24 0258 (!) 186/79 35.8 °C (96.4 °F) 84 16 97 % 1.6 m (5' 3\") 45.4 kg (100 lb)     Body mass index is 17.71 kg/m².  GENERAL: NAD. A&Ox1. Cooperative. Pleasant.   Left hip MSK:  - Leg short and externally rotated, no rash, ecchymosis, abrasions or lacerations noted over hip  - +log roll, heel strike  - Wiggles toes; + EHL/TA/TP/GS function intact  - SILT L3-S1; 2+ DP pulse, BCR across all digits   - Compartments soft and compressible   - TTP over groin  Secondary skeletal exam   - No tenderness or obvious deformity to long bones or other major joints    DATA:   Diagnostic tests reviewed for today's visit:      LABORATORY TESTS:  Results for orders placed or performed during the hospital encounter of 12/11/24 (from the past 24 hours)   CBC and Auto Differential   Result Value Ref Range    WBC 20.7 (H) 4.4 - 11.3 x10*3/uL    nRBC 0.0 0.0 - 0.0 /100 WBCs    RBC 4.03 4.00 - 5.20 x10*6/uL    Hemoglobin 12.7 12.0 - 16.0 g/dL    Hematocrit 37.6 36.0 - 46.0 %    MCV 93 80 - 100 fL    MCH 31.5 26.0 - 34.0 pg    MCHC 33.8 32.0 - 36.0 g/dL    RDW 13.2 11.5 - 14.5 %    Platelets 296 150 - 450 x10*3/uL    Neutrophils % 86.1 40.0 - 80.0 %    Immature Granulocytes %, Automated 0.7 0.0 - 0.9 %    Lymphocytes % 7.2 13.0 - 44.0 %    Monocytes % 5.5 2.0 - 10.0 %    Eosinophils % 0.2 0.0 - 6.0 %    Basophils % 0.3 0.0 - 2.0 %    Neutrophils Absolute 17.81 (H) 1.60 - 5.50 x10*3/uL    Immature Granulocytes Absolute, Automated 0.15 0.00 - 0.50 x10*3/uL    Lymphocytes Absolute 1.48 0.80 - 3.00 x10*3/uL    Monocytes Absolute 1.14 (H) 0.05 - 0.80 x10*3/uL    Eosinophils Absolute 0.04 0.00 - 0.40 x10*3/uL    Basophils Absolute 0.06 0.00 - " 0.10 x10*3/uL   Comprehensive metabolic panel   Result Value Ref Range    Glucose 105 (H) 74 - 99 mg/dL    Sodium 132 (L) 136 - 145 mmol/L    Potassium 4.1 3.5 - 5.3 mmol/L    Chloride 94 (L) 98 - 107 mmol/L    Bicarbonate 30 21 - 32 mmol/L    Anion Gap 12 10 - 20 mmol/L    Urea Nitrogen 11 6 - 23 mg/dL    Creatinine 0.52 0.50 - 1.05 mg/dL    eGFR 86 >60 mL/min/1.73m*2    Calcium 9.8 8.6 - 10.3 mg/dL    Albumin 4.3 3.4 - 5.0 g/dL    Alkaline Phosphatase 132 33 - 136 U/L    Total Protein 7.5 6.4 - 8.2 g/dL    AST 16 9 - 39 U/L    Bilirubin, Total 0.5 0.0 - 1.2 mg/dL    ALT 11 7 - 45 U/L   Protime-INR   Result Value Ref Range    Protime 11.8 9.8 - 12.8 seconds    INR 1.0 0.9 - 1.1   aPTT   Result Value Ref Range    aPTT 32 27 - 38 seconds   Type And Screen   Result Value Ref Range    ABO TYPE O     Rh TYPE POS     ANTIBODY SCREEN NEG    Troponin I, High Sensitivity, Initial   Result Value Ref Range    Troponin I, High Sensitivity 8 0 - 13 ng/L   Troponin, High Sensitivity, 1 Hour   Result Value Ref Range    Troponin I, High Sensitivity 7 0 - 13 ng/L   VERIFY ABO/Rh Group Test   Result Value Ref Range    ABO TYPE O     Rh TYPE POS    Creatine Kinase   Result Value Ref Range    Creatine Kinase 38 0 - 215 U/L   Urinalysis with Reflex Culture and Microscopic   Result Value Ref Range    Color, Urine Light-Yellow Light-Yellow, Yellow, Dark-Yellow    Appearance, Urine Clear Clear    Specific Gravity, Urine 1.012 1.005 - 1.035    pH, Urine 6.5 5.0, 5.5, 6.0, 6.5, 7.0, 7.5, 8.0    Protein, Urine NEGATIVE NEGATIVE, 10 (TRACE), 20 (TRACE) mg/dL    Glucose, Urine Normal Normal mg/dL    Blood, Urine 0.2 (2+) (A) NEGATIVE    Ketones, Urine NEGATIVE NEGATIVE mg/dL    Bilirubin, Urine NEGATIVE NEGATIVE    Urobilinogen, Urine Normal Normal mg/dL    Nitrite, Urine NEGATIVE NEGATIVE    Leukocyte Esterase, Urine NEGATIVE NEGATIVE   Urinalysis Microscopic   Result Value Ref Range    WBC, Urine 1-5 1-5, NONE /HPF    RBC, Urine 6-10 (A)  NONE, 1-2, 3-5 /HPF    Bacteria, Urine 1+ (A) NONE SEEN /HPF   Sars-CoV-2 PCR   Result Value Ref Range    Coronavirus 2019, PCR Not Detected Not Detected           IMAGING TESTS:  XR hip left with pelvis when performed 2 or 3 views 12/11/2024    Narrative  Interpreted By:  Daniel Painter,  STUDY:  XR HIP LEFT WITH PELVIS WHEN PERFORMED 2 OR 3 VIEWS; ;  12/11/2024  4:38 am    INDICATION:  Signs/Symptoms:fall, L hip pain.      COMPARISON:  None.    ACCESSION NUMBER(S):  [id number]    ORDERING CLINICIAN:  ELYSE KLERMAN    FINDINGS:  Nondisplaced intertrochanteric left hip fracture seen. This is best  visualized on the lateral view. Mild bilateral hip arthritic  degenerative changes.    Impression  Nondisplaced left intertrochanteric hip fracture.      MACRO:  None    Signed by: Daniel Painter 12/11/2024 5:02 AM  Dictation workstation:   [id number]      PROCEDURE:  None    Thank you for this consultation.  I appreciate the opportunity to be involved in the patient's care.     Ayush Junior, DO  Orthopaedic Surgery  Sports Medicine & Shoulder  [facility]   [phone]

## 2024-12-12 ENCOUNTER — APPOINTMENT (OUTPATIENT)
Dept: CARDIOLOGY | Facility: HOSPITAL | Age: 89
DRG: 481 | End: 2024-12-12
Payer: MEDICARE

## 2024-12-12 LAB
ANION GAP SERPL CALC-SCNC: 12 MMOL/L (ref 10–20)
BACTERIA UR CULT: NO GROWTH
BUN SERPL-MCNC: 14 MG/DL (ref 6–23)
CALCIUM SERPL-MCNC: 8.9 MG/DL (ref 8.6–10.3)
CHLORIDE SERPL-SCNC: 94 MMOL/L (ref 98–107)
CO2 SERPL-SCNC: 28 MMOL/L (ref 21–32)
CREAT SERPL-MCNC: 0.56 MG/DL (ref 0.5–1.05)
EGFRCR SERPLBLD CKD-EPI 2021: 84 ML/MIN/1.73M*2
ERYTHROCYTE [DISTWIDTH] IN BLOOD BY AUTOMATED COUNT: 13.2 % (ref 11.5–14.5)
GLUCOSE SERPL-MCNC: 113 MG/DL (ref 74–99)
HCT VFR BLD AUTO: 29.9 % (ref 36–46)
HGB BLD-MCNC: 10.1 G/DL (ref 12–16)
MCH RBC QN AUTO: 31.1 PG (ref 26–34)
MCHC RBC AUTO-ENTMCNC: 33.8 G/DL (ref 32–36)
MCV RBC AUTO: 92 FL (ref 80–100)
NRBC BLD-RTO: 0 /100 WBCS (ref 0–0)
PLATELET # BLD AUTO: 221 X10*3/UL (ref 150–450)
POTASSIUM SERPL-SCNC: 4.1 MMOL/L (ref 3.5–5.3)
RBC # BLD AUTO: 3.25 X10*6/UL (ref 4–5.2)
SODIUM SERPL-SCNC: 130 MMOL/L (ref 136–145)
WBC # BLD AUTO: 14.3 X10*3/UL (ref 4.4–11.3)

## 2024-12-12 PROCEDURE — 97162 PT EVAL MOD COMPLEX 30 MIN: CPT | Mod: GP

## 2024-12-12 PROCEDURE — 97165 OT EVAL LOW COMPLEX 30 MIN: CPT | Mod: GO

## 2024-12-12 PROCEDURE — 36415 COLL VENOUS BLD VENIPUNCTURE: CPT | Performed by: STUDENT IN AN ORGANIZED HEALTH CARE EDUCATION/TRAINING PROGRAM

## 2024-12-12 PROCEDURE — 1210000001 HC SEMI-PRIVATE ROOM DAILY

## 2024-12-12 PROCEDURE — 2500000001 HC RX 250 WO HCPCS SELF ADMINISTERED DRUGS (ALT 637 FOR MEDICARE OP): Performed by: STUDENT IN AN ORGANIZED HEALTH CARE EDUCATION/TRAINING PROGRAM

## 2024-12-12 PROCEDURE — 80048 BASIC METABOLIC PNL TOTAL CA: CPT | Performed by: STUDENT IN AN ORGANIZED HEALTH CARE EDUCATION/TRAINING PROGRAM

## 2024-12-12 PROCEDURE — 2500000004 HC RX 250 GENERAL PHARMACY W/ HCPCS (ALT 636 FOR OP/ED): Performed by: STUDENT IN AN ORGANIZED HEALTH CARE EDUCATION/TRAINING PROGRAM

## 2024-12-12 PROCEDURE — 85027 COMPLETE CBC AUTOMATED: CPT | Performed by: STUDENT IN AN ORGANIZED HEALTH CARE EDUCATION/TRAINING PROGRAM

## 2024-12-12 PROCEDURE — 93005 ELECTROCARDIOGRAM TRACING: CPT

## 2024-12-12 PROCEDURE — 99232 SBSQ HOSP IP/OBS MODERATE 35: CPT | Performed by: INTERNAL MEDICINE

## 2024-12-12 ASSESSMENT — COGNITIVE AND FUNCTIONAL STATUS - GENERAL
STANDING UP FROM CHAIR USING ARMS: A LOT
MOVING TO AND FROM BED TO CHAIR: TOTAL
DRESSING REGULAR UPPER BODY CLOTHING: A LITTLE
EATING MEALS: A LITTLE
TOILETING: TOTAL
MOVING FROM LYING ON BACK TO SITTING ON SIDE OF FLAT BED WITH BEDRAILS: A LOT
CLIMB 3 TO 5 STEPS WITH RAILING: TOTAL
MOVING FROM LYING ON BACK TO SITTING ON SIDE OF FLAT BED WITH BEDRAILS: TOTAL
HELP NEEDED FOR BATHING: TOTAL
WALKING IN HOSPITAL ROOM: TOTAL
PERSONAL GROOMING: A LITTLE
HELP NEEDED FOR BATHING: A LITTLE
TURNING FROM BACK TO SIDE WHILE IN FLAT BAD: TOTAL
MOVING TO AND FROM BED TO CHAIR: A LOT
DRESSING REGULAR UPPER BODY CLOTHING: A LOT
TOILETING: A LOT
TURNING FROM BACK TO SIDE WHILE IN FLAT BAD: A LOT
DAILY ACTIVITIY SCORE: 11
PERSONAL GROOMING: A LITTLE
MOBILITY SCORE: 7
DRESSING REGULAR LOWER BODY CLOTHING: TOTAL
CLIMB 3 TO 5 STEPS WITH RAILING: TOTAL
DRESSING REGULAR LOWER BODY CLOTHING: A LOT
WALKING IN HOSPITAL ROOM: A LOT
STANDING UP FROM CHAIR USING ARMS: A LOT
DAILY ACTIVITIY SCORE: 16
EATING MEALS: A LITTLE
MOBILITY SCORE: 11

## 2024-12-12 ASSESSMENT — PAIN SCALES - PAIN ASSESSMENT IN ADVANCED DEMENTIA (PAINAD)
BODYLANGUAGE: TENSE, DISTRESSED PACING, FIDGETING
FACIALEXPRESSION: FACIAL GRIMACING
TOTALSCORE: 4
FACIALEXPRESSION: FACIAL GRIMACING
CONSOLABILITY: DISTRACTED OR REASSURED BY VOICE/TOUCH
BREATHING: NORMAL
TOTALSCORE: 5
TOTALSCORE: MEDICATION (SEE MAR)
CONSOLABILITY: NO NEED TO CONSOLE
BREATHING: NORMAL
NEGVOCALIZATION: OCCASIONAL MOAN/GROAN, LOW SPEECH, NEGATIVE/DISAPPROVING QUALITY
NEGVOCALIZATION: OCCASIONAL MOAN/GROAN, LOW SPEECH, NEGATIVE/DISAPPROVING QUALITY
TOTALSCORE: MEDICATION (SEE MAR)
BODYLANGUAGE: TENSE, DISTRESSED PACING, FIDGETING

## 2024-12-12 ASSESSMENT — PAIN - FUNCTIONAL ASSESSMENT: PAIN_FUNCTIONAL_ASSESSMENT: 0-10

## 2024-12-12 ASSESSMENT — PAIN SCALES - GENERAL: PAINLEVEL_OUTOF10: 0 - NO PAIN

## 2024-12-12 NOTE — PROGRESS NOTES
Julissa Plasencia is a 89 y.o. female on day 1 of admission presenting with Closed fracture of left hip, initial encounter.      Subjective   Patient underwent ORIF left hip yesterday. Patient seen and examined at bedside. She is alert and oriented to name and place but still appears confused. She denies any issues.       Objective     Last Recorded Vitals  /82   Pulse 98   Temp 35.1 °C (95.2 °F)   Resp 16   Wt 45.4 kg (100 lb 1.4 oz)   SpO2 99%   Intake/Output last 3 Shifts:    Intake/Output Summary (Last 24 hours) at 12/12/2024 1341  Last data filed at 12/12/2024 0125  Gross per 24 hour   Intake 750 ml   Output 125 ml   Net 625 ml       Admission Weight  Weight: 45.4 kg (100 lb) (12/11/24 0258)    Daily Weight  12/11/24 : 45.4 kg (100 lb 1.4 oz)    Image Results  ECG 12 lead  Sinus rhythm  Atrial premature complex  Incomplete right bundle branch block  Low voltage, extremity leads  Prolonged QT interval  CT chest w IV contrast  Narrative: Interpreted By:  Asaf Mortensen,   STUDY:  CT CHEST W IV CONTRAST;  12/11/2024 8:42 pm      INDICATION:  Signs/Symptoms:nodule vs pna.      COMPARISON:  Chest film of 12/11/2024      ACCESSION NUMBER(S):  [id number]      ORDERING CLINICIAN:  JUSTIN MORA      TECHNIQUE:  Helical data acquisition of the chest was obtained  without IV  contrast material.  Images were reformatted in axial, coronal, and  sagittal planes.      FINDINGS:  LUNGS and AIRWAYS:  There is some tracheal secretions.  Lung parenchymal detail is degraded due to respiratory motion and  beam hardening from the patient's overlying arms.Considering this  there is an 8 mm calcified granuloma at the middle lobe, which may  account for the nodule described on the recent chest film. There are  several coarse bands of atelectasis and/or fibrosis in the left lower  lung, including with mild compressive atelectasis at the base  posteriorly.      MEDIASTINUM and KALIE, LOWER NECK AND AXILLA:  The visualized thyroid  gland is within normal limits.      No evidence of thoracic lymphadenopathy by CT criteria.      HEART and VESSELS:  The thoracic aorta is of normal course and caliber , but with  moderate atherosclerotic calcification .      Main pulmonary artery and its branches are normal in caliber.      There is mild-to-moderate coronary artery atherosclerotic  calcification primarily involving the LAD.      The cardiac chambers are not enlarged.      UPPER ABDOMEN:  The visualized subdiaphragmatic structures demonstrate no remarkable  findings.      CHEST WALL, OSSEOUS STRUCTURES AND OTHER FINDINGS:  Moderate dextroscoliosis with mild spondylosis. Osteoarthritis at the  glenohumeral joints, moderate greater on the left. Otherwise no  suspicious osseous lesions.      Impression: 1.  Calcified middle lobe granuloma.  2. Additional nonacute findings as above.      Signed by: Asaf Mortensen 12/12/2024 8:21 AM  Dictation workstation:   [id number]      Physical Exam  Constitutional:       Comments: thin   HENT:      Head: Normocephalic.      Ears:      Comments: Left forehead hematoma/abrasion noted     Nose: Nose normal.      Mouth/Throat:      Mouth: Mucous membranes are dry.   Eyes:      Conjunctiva/sclera: Conjunctivae normal.   Cardiovascular:      Rate and Rhythm: Normal rate and regular rhythm.      Heart sounds: Normal heart sounds.   Pulmonary:      Effort: Pulmonary effort is normal.      Breath sounds: Normal breath sounds.      Comments: Anteriorly  Abdominal:      General: Bowel sounds are normal.      Palpations: Abdomen is soft.      Tenderness: There is no abdominal tenderness.   Musculoskeletal:      Cervical back: Neck supple.      Comments: Decreased range of motion to left hip, circulation movement sensation intact to left toes   Skin:     General: Skin is warm.      Comments: See head assessment   Neurological:      Mental Status: She is alert. Mental status is at baseline.   Psychiatric:         Mood and Affect:  Mood normal.      Relevant Results               Assessment/Plan                  Assessment & Plan  Closed fracture of left hip, initial encounter    Left hip fracture  Leukocytosis  Admit to [facility]  Appreciate ortho recs  NPO  Morphine  Bedrest  Add ck  Add urine cx  Check covid  Am CBC, BMP  Leukocytosis may be a result of trauma, will hold antibiotics at this time.     Prolonged QT  Daily EKGs     Suspected Lung nodule  Consider CT chest        Chronic conditions: Hypertension, headaches  Continue home medications as listed above     DVT prophylaxis  SCDs  No chemical prophylaxis until evaluated by Ortho     Patient's Revised Cardiac Risk Index is 0. CXR reviewed, CT chest ordered but overall stable from respiratory stand point. Patient is medically cleared for surgery.     12/12: S/p ORIF left hip. Hb trending down, will monitor. Leukocytosis improved from 20 down to 14. Continue to monitor off antibiotics as there have been no fevers. PT/OT eval pending. Anticipate patient will need SNF.              Andrew Alamo, DO

## 2024-12-12 NOTE — CARE PLAN
The patient's goals for the shift include  comfort and safety    The clinical goals for the shift include comfort and safety

## 2024-12-12 NOTE — PROGRESS NOTES
Occupational Therapy    Evaluation    Patient Name: Julissa Plasencia  MRN: [id number]  Department: [facility]  Room: 729/729-B  Today's Date: 12/12/2024  Time Calculation  Start Time: 1009  Stop Time: 1022  Time Calculation (min): 13 min        Assessment:  End of Session Communication: Bedside nurse  End of Session Patient Position: Bed, 2 rail up, Alarm on (call light in reach, all needs met)  OT Assessment Results: Decreased ADL status, Decreased safe judgment during ADL, Decreased cognition, Decreased endurance, Decreased functional mobility  Strengths: Living arrangement secure  Barriers to Participation: Comorbidities  Plan:  Treatment Interventions: ADL retraining, Functional transfer training, UE strengthening/ROM, Endurance training, Equipment evaluation/education, Compensatory technique education, Fine motor coordination activities  OT Frequency: 3 times per week  OT Discharge Recommendations: Moderate intensity level of continued care  OT - OK to Discharge: Yes (once medically appropriate to next level of care)  Treatment Interventions: ADL retraining, Functional transfer training, UE strengthening/ROM, Endurance training, Equipment evaluation/education, Compensatory technique education, Fine motor coordination activities    Subjective   Current Problem:  1. Closed fracture of left hip, initial encounter  Case Request Operating Room: Hip Fracture ORIF w/ Nail Trochanteric    Case Request Operating Room: Hip Fracture ORIF w/ Nail Trochanteric      2. Hyponatremia        3. Leukocytosis, unspecified type          General:  General  Reason for Referral: OT eval and tx; ADLs. 12/11: s/p ORIF LEFT HIP FRACTURE. CT Head: left frontal hematoma. Chest x-ray: potential developing pulmonary nodule  R infrahilar region. xray hip: L nondisplaced intertrochanteric fracture  Referred By: Jasmeet  Past Medical History Relevant to Rehab: 89 y.o. female    with a past medical history of hypertension, confusion, headaches, weakness,  falls, and pelvic fracture who presented today after a fall.  HPI is limited due to patient's confusion.  She notes she does recall falling.  Reports she ambulated to the bathroom and it was wet on the floor and she slipped.  She reports she was on the floor for a time and was calling for help.  She admits to head injury but does not recall any dizziness prior to the fall.  It is difficult to ascertain if patient has been ill recently.  She complains of severe left hip pain.  Medical record indicates patient was sent in from nursing home after she was found down on the floor with a head injury so 911 was called.  Co-Treatment: PT  Co-Treatment Reason: for safety and to maximize therapeutic performance  Prior to Session Communication: Bedside nurse  Patient Position Received: Bed, 2 rail up, Alarm on  General Comment: patient pleasant and cooperative to participate  Precautions:  Medical Precautions: Fall precautions (alarms, per 12/12 ortho note: WBAT. 02 via NC in place throughout, fall precautions.)            Pain:  Pain Assessment  Pain Assessment:  (did not report pain; however appears to be in L hip pain with movement; repositioned EOS for comfort)    Objective   Cognition:  Overall Cognitive Status: Impaired (oriented to name and birth date; unable to report birth year or other orientation info)           Home Living:  Type of Home:  (per chart review; patient lives at [facility]. patient reports assist for ADLs. use of WW. patient unable to report further PLOF info due to cog deficits)    ADL:  LE Dressing Deficit:  (dependent assistance to abel non skid socks seated EOB)  Activity Tolerance:  Endurance: Decreased tolerance for upright activites  Bed Mobility/Transfers: Bed Mobility  Bed Mobility:  (completed supine <-->sit with dependent assist x 2. patient able to sit EOB with MIN A)    Transfers  Transfer:  (completed STS with MOD A x 2 with WW)      Functional Mobility:  Functional  Mobility  Functional Mobility Performed:  (unable to complete side steps or simple mobility this date)     Sensation:  Light Touch: No apparent deficits  Strength:  Strength Comments: BUE ROM/MMT WFL for patient age as seen through bed mobility/transfers this date      Outcome Measures:[facility] Daily Activity  Putting on and taking off regular lower body clothing: Total  Bathing (including washing, rinsing, drying): Total  Putting on and taking off regular upper body clothing: A lot  Toileting, which includes using toilet, bedpan or urinal: Total  Taking care of personal grooming such as brushing teeth: A little  Eating Meals: A little  Daily Activity - Total Score: 11        Education Documentation  Body Mechanics, taught by Macarena Mcnulty OT at 12/12/2024  2:04 PM.  Learner: Patient  Readiness: Acceptance  Method: Explanation  Response: Needs Reinforcement, Demonstrated Understanding    Precautions, taught by Macarena Mcnulty OT at 12/12/2024  2:04 PM.  Learner: Patient  Readiness: Acceptance  Method: Explanation  Response: Needs Reinforcement, Demonstrated Understanding    ADL Training, taught by Macarena Mcnulty OT at 12/12/2024  2:04 PM.  Learner: Patient  Readiness: Acceptance  Method: Explanation  Response: Needs Reinforcement, Demonstrated Understanding    Education Comments  No comments found.        OP EDUCATION:       Goals:  Encounter Problems       Encounter Problems (Active)       OT Goals       STG- patient will complete LB dressing at MOD A with use of ae/ad/dme prn  (Progressing)       Start:  12/12/24    Expected End:  12/26/24            STG- patient will complete toileting at MOD A with use of ae/ad/dme prn (Progressing)       Start:  12/12/24    Expected End:  12/26/24            STG- patient will complete transfers to/from bed, chair, commode with MIN A with use of ae/ad/dme prn (Progressing)       Start:  12/12/24    Expected End:  12/26/24            STG- patient will complete simple mobility  with MOD A with use of ae/ad/dme prn (Progressing)       Start:  12/12/24    Expected End:  12/26/24            STG- patient will complete grooming with CGA with use of ae/ad/dme prn (Progressing)       Start:  12/12/24    Expected End:  12/26/24

## 2024-12-12 NOTE — CONSULTS
"Nutrition Initial Assessment:   Nutrition Assessment    Reason for Assessment: Provider consult order    Patient is a 89 y.o. female presenting with closed fracture of left hip. Pt sleeping at time of attempted assessment and did not wake up to her name being called. Also noted to be confused.       Nutrition History:  Food and Nutrient History: No meal intakes documented in EMR. Pt lunch tray observed, 0% consumed. Unable to assess for presence of GI symptoms or difficulty chewing/swallowing.  Vitamin/Herbal Supplement Use: vitamin D3 per home med list  Food Allergies/Intolerances:  None  GI Symptoms:  KRISTINA  Oral Problems:  KRISTINA       Anthropometrics:  Height: 160 cm (5' 2.99\")   Weight: 45.4 kg (100 lb 1.4 oz)   BMI (Calculated): 17.73  IBW/kg (Dietitian Calculated): 52.3 kg          Weight History:   Wt Readings from Last 10 Encounters:   12/11/24 45.4 kg (100 lb 1.4 oz)   07/01/24 45.4 kg (100 lb)   11/09/23 49.9 kg (110 lb)   11/03/22 37.2 kg (82 lb)   05/18/22 37 kg (81 lb 8 oz)   04/04/22 37.2 kg (82 lb)   02/15/22 38.2 kg (84 lb 4 oz)   11/16/21 38.6 kg (85 lb)   10/12/21 38.6 kg (85 lb 2 oz)   10/04/21 39.2 kg (86 lb 6.7 oz)      Weight Change %:  Weight History / % Weight Change: Based on available wt records in EMR, pt appears to have gained weight since 2022. Wt stable x 5 months. Pt is underweight based on BMI of 17.7 kg/m2.  Significant Weight Loss: No    Nutrition Focused Physical Exam Findings:  defer: pt sleeping, confused  Subcutaneous Fat Loss:   Orbital Fat Pads: Defer  Muscle Wasting:  Temporalis: Defer  Edema:  Edema: none  Physical Findings:  Skin: Positive (left eye abrasion, left leg wound per nursing assessment)    Nutrition Significant Labs:    Reviewed   Nutrition Specific Medications:  Reviewed     I/O:    ;      Dietary Orders (From admission, onward)       Start     Ordered    12/12/24 1434  Oral nutritional supplements  Until discontinued        Question Answer Comment   Deliver with " Lunch    Select supplement: Magic Cup        12/12/24 1433    12/12/24 1434  Oral nutritional supplements  Until discontinued        Question Answer Comment   Deliver with Breakfast    Deliver with Dinner    Select supplement: Ensure Plus High Protein        12/12/24 1433    12/11/24 1721  Adult diet Regular  Diet effective now        Question:  Diet type  Answer:  Regular    12/11/24 1720    12/11/24 1111  May Not Participate in Room Service  ( ROOM SERVICE MAY NOT PARTICIPATE)  Once        Question:  .  Answer:  Yes    12/11/24 1110                     Estimated Needs:   Total Energy Estimated Needs (kCal): 1464 kCal  Method for Estimating Needs: 28 kcal/kg IBW  Total Protein Estimated Needs (g): 52 g  Method for Estimating Needs: 1 g/kg IBW  Total Fluid Estimated Needs (mL): 1464 mL  Method for Estimating Needs: 1 ml/kcal or per MD        Nutrition Diagnosis   Malnutrition Diagnosis  Patient has Malnutrition Diagnosis:  (unable to determine at this time)    Nutrition Diagnosis  Patient has Nutrition Diagnosis: Yes  Diagnosis Status (1): New  Nutrition Diagnosis 1: Predicted inadequate energy intake  Related to (1): current medical condition  As Evidenced by (1): pt confused, sleeping through meals       Nutrition Interventions/Recommendations         Nutrition Prescription:  Individualized Nutrition Prescription Provided for : 1464 kcal, 52 g protein, and 1464 ml fluid via regular diet with ONS        Nutrition Interventions:   Interventions: Meals and snacks, Medical food supplement  Meals and Snacks: General healthful diet  Goal: Consumes 3 meals per day  Medical Food Supplement: Commercial beverage  Goal: Ensure Plus High Protein BID (provides 350 kcal, 20 g protein per serving).  Magic cup daily (290 kcal, 9 g protein per serving).         Nutrition Education:   N/A - pt sleeping/confused       Nutrition Monitoring and Evaluation   Food/Nutrient Related History Monitoring  Monitoring and Evaluation Plan:  Energy intake, Amount of food, Fluid intake  Energy Intake: Estimated energy intake  Criteria: Pt meets >75% of estimated energy needs  Fluid Intake: Estimated fluid intake  Criteria: Meets >75% of estimated fluid needs  Amount of Food: Estimated amout of food, Medical food intake  Criteria: Pt consumes >75% of meals and supplements    Body Composition/Growth/Weight History  Monitoring and Evaluation Plan: Weight  Weight: Measured weight  Criteria: Maintains stable weight                   Time Spent (min): 30 minutes

## 2024-12-12 NOTE — PROGRESS NOTES
12/12/24 1459   Discharge Planning   Living Arrangements Other (Comment)  (AL)   Support Systems Children   Assistance Needed ADL's   Type of Residence Assisted living   Do you have animals or pets at home? No   Care Facility Name [facility]   Who is requesting discharge planning? Provider   Home or Post Acute Services In home services   Type of Home Care Services Home OT;Home PT   Expected Discharge Disposition Home H   Does the patient need discharge transport arranged? Yes   RoundTrip coordination needed? Yes   Has discharge transport been arranged? No   Intensity of Service   Intensity of Service 0-30 min     Spoke with son on the phone with number provided in the chart. Introduced self and role as care coordinator. Demographics verified. Patient PCP is FREDRICK Alamo. Medicare and [place] is the insurance. Patient is a resident at [facility]. Patient needs assistance with her ADL's. Patient uses a walker and a w/c. Son would like patient to discharge to [facility] and there PT program. Care coordinator will follow for discharge planning.

## 2024-12-12 NOTE — PROGRESS NOTES
Physical Therapy    Physical Therapy Evaluation    Patient Name: Julissa Plasencia  MRN: [id number]  Today's Date: 12/12/2024   Time Calculation  Start Time: 1009  Stop Time: 1022  Time Calculation (min): 13 min  729/729-B    Assessment/Plan   PT Assessment  PT Assessment Results: Decreased strength, Decreased range of motion, Decreased endurance, Impaired balance, Decreased mobility, Decreased cognition, Impaired judgement, Decreased safety awareness, Pain  Rehab Prognosis: Fair  Evaluation/Treatment Tolerance: Patient limited by fatigue, Patient limited by pain  Barriers to Participation: Comorbidities  End of Session Communication: Bedside nurse  Assessment Comment: Pt admitted with a fall and is s/p ORIF L hip with WBAT. Pt demos decreased safety, endurance and strength. Recommend moderate intensity therapy.  End of Session Patient Position: Bed, 2 rail up, Alarm on  IP OR SWING BED PT PLAN  Inpatient or Swing Bed: Inpatient  PT Plan  Treatment/Interventions: Bed mobility, Transfer training, Gait training, Balance training, Strengthening, Endurance training, Range of motion, Therapeutic exercise, Therapeutic activity, Home exercise program, Positioning  PT Plan: Ongoing PT  PT Frequency: 3 times per week  PT Discharge Recommendations: Moderate intensity level of continued care  PT - OK to Discharge: Yes    Subjective     Current Problem:  1. Closed fracture of left hip, initial encounter  Case Request Operating Room: Hip Fracture ORIF w/ Nail Trochanteric    Case Request Operating Room: Hip Fracture ORIF w/ Nail Trochanteric      2. Hyponatremia        3. Leukocytosis, unspecified type          Patient Active Problem List   Diagnosis    Hypertension, essential    Confusion    Occlusion and stenosis of left carotid artery    Vitamin D deficiency    Headaches due to old head injury    Weakness    Acute pain of right knee    Fall    Rib pain    Other closed fracture of left pubis, initial encounter    Slow transit  constipation    Primary insomnia    Closed fracture of left hip, initial encounter       General Visit Information:  General  Reason for Referral: PT eval and treat; impaired mobility  Referred By: Dr. Alamo  Past Medical History Relevant to Rehab: 89 y.o. female    with a past medical history of hypertension, confusion, headaches, weakness, falls, and pelvic fracture who presented today after a fall.  HPI is limited due to patient's confusion.  She notes she does recall falling.  Reports she ambulated to the bathroom and it was wet on the floor and she slipped.  She reports she was on the floor for a time and was calling for help.  She admits to head injury but does not recall any dizziness prior to the fall.  It is difficult to ascertain if patient has been ill recently.  She complains of severe left hip pain.  Medical record indicates patient was sent in from nursing home after she was found down on the floor with a head injury so 911 was called.  Co-Treatment: OT  Co-Treatment Reason: for safety and to maximize therapeutic performance  Prior to Session Communication: Bedside nurse  Patient Position Received: Bed, 2 rail up, Alarm on  General Comment: patient pleasant and cooperative to participate    Home Living:  Home Living  Type of Home:  (per chart review; patient lives at [facility]. patient reports assist for ADLs. use of WW. patient unable to report further PLOF info due to cog deficits)    Precautions:  Precautions  Medical Precautions: Fall precautions (alarms, per 12/12 ortho note: WBAT. 02 via NC in place throughout, fall precautions.)     Objective     Pain:  Pain Assessment  Pain Assessment: 0-10  0-10 (Numeric) Pain Score:  (none at rest; increased with mobility did not rate)    Cognition:  Cognition  Overall Cognitive Status: Impaired (oriented to name and birth date; unable to report birth year or other orientation info)    General Assessments:      Activity Tolerance  Endurance: Decreased  tolerance for upright activites  Sensation  Light Touch: No apparent deficits  Strength  Strength Comments: BLE grossly decreased <3/5 in LLE     Functional Assessments:     Bed Mobility  Bed Mobility:  (completed supine <> sit with dep x2. Denies dizziness. Very fearful of movement due to pain.)  Transfers  Transfer:  (completed from EOB to FWW with modA x2 and cues for hand placement. Denies dizziness. Limted by pain and unable to take any steps)           Extremity/Trunk Assessments:        RLE   RLE : Within Functional Limits       Outcome Measures:     [facility] Basic Mobility  Turning from your back to your side while in a flat bed without using bedrails: Total  Moving from lying on your back to sitting on the side of a flat bed without using bedrails: Total  Moving to and from bed to chair (including a wheelchair): Total  Standing up from a chair using your arms (e.g. wheelchair or bedside chair): A lot  To walk in hospital room: Total  Climbing 3-5 steps with railing: Total  Basic Mobility - Total Score: 7    Goals:  Encounter Problems       Encounter Problems (Active)       PT Problem       PT Goal 1 STG - Pt will transition supine <> sitting with ModA x1  (Progressing)       Start:  12/12/24    Expected End:  12/26/24            PT Goal 2 STG - Pt will transfer STS with ModA x1  (Progressing)       Start:  12/12/24    Expected End:  12/26/24            PT Goal 3 STG - Pt will amb 25' using FWW with Larisa  (Progressing)       Start:  12/12/24    Expected End:  12/26/24            PT Goal 4 STG - Pt will perform a B LE ther ex program of 2-3 sets of 10  (Progressing)       Start:  12/12/24    Expected End:  12/26/24               Pain - Adult            Education Documentation  Precautions, taught by Francisca Elam PT at 12/12/2024  4:38 PM.  Learner: Patient  Readiness: Acceptance  Method: Explanation  Response: Verbalizes Understanding, Needs Reinforcement    Mobility Training, taught by Francisca Elam PT at  12/12/2024  4:38 PM.  Learner: Patient  Readiness: Acceptance  Method: Explanation  Response: Verbalizes Understanding, Needs Reinforcement    Education Comments  No comments found.

## 2024-12-12 NOTE — CARE PLAN
The patient's goals for the shift include  comfort and safety    The clinical goals for the shift include pain mnagement

## 2024-12-13 ENCOUNTER — HOSPITAL ENCOUNTER (OUTPATIENT)
Dept: CARDIOLOGY | Facility: HOSPITAL | Age: 89
Discharge: HOME | DRG: 481 | End: 2024-12-13
Payer: MEDICARE

## 2024-12-13 VITALS
HEIGHT: 63 IN | RESPIRATION RATE: 18 BRPM | HEART RATE: 102 BPM | TEMPERATURE: 98.6 F | WEIGHT: 100.09 LBS | DIASTOLIC BLOOD PRESSURE: 60 MMHG | BODY MASS INDEX: 17.73 KG/M2 | OXYGEN SATURATION: 97 % | SYSTOLIC BLOOD PRESSURE: 127 MMHG

## 2024-12-13 LAB
ANION GAP SERPL CALC-SCNC: 13 MMOL/L (ref 10–20)
ATRIAL RATE: 111 BPM
BUN SERPL-MCNC: 20 MG/DL (ref 6–23)
CALCIUM SERPL-MCNC: 8.9 MG/DL (ref 8.6–10.3)
CHLORIDE SERPL-SCNC: 96 MMOL/L (ref 98–107)
CO2 SERPL-SCNC: 29 MMOL/L (ref 21–32)
CREAT SERPL-MCNC: 0.52 MG/DL (ref 0.5–1.05)
EGFRCR SERPLBLD CKD-EPI 2021: 86 ML/MIN/1.73M*2
ERYTHROCYTE [DISTWIDTH] IN BLOOD BY AUTOMATED COUNT: 13.3 % (ref 11.5–14.5)
GLUCOSE SERPL-MCNC: 87 MG/DL (ref 74–99)
HCT VFR BLD AUTO: 30.6 % (ref 36–46)
HGB BLD-MCNC: 9.8 G/DL (ref 12–16)
MCH RBC QN AUTO: 30.6 PG (ref 26–34)
MCHC RBC AUTO-ENTMCNC: 32 G/DL (ref 32–36)
MCV RBC AUTO: 96 FL (ref 80–100)
NRBC BLD-RTO: 0 /100 WBCS (ref 0–0)
P AXIS: 50 DEGREES
P OFFSET: 162 MS
P ONSET: 143 MS
PLATELET # BLD AUTO: 183 X10*3/UL (ref 150–450)
POTASSIUM SERPL-SCNC: 4.5 MMOL/L (ref 3.5–5.3)
PR INTERVAL: 146 MS
Q ONSET: 216 MS
QRS COUNT: 24 BEATS
QRS DURATION: 84 MS
QT INTERVAL: 340 MS
QTC CALCULATION(BAZETT): 526 MS
QTC FREDERICIA: 455 MS
R AXIS: 22 DEGREES
RBC # BLD AUTO: 3.2 X10*6/UL (ref 4–5.2)
SODIUM SERPL-SCNC: 133 MMOL/L (ref 136–145)
T AXIS: 31 DEGREES
T OFFSET: 386 MS
VENTRICULAR RATE: 144 BPM
WBC # BLD AUTO: 12.7 X10*3/UL (ref 4.4–11.3)

## 2024-12-13 PROCEDURE — 80048 BASIC METABOLIC PNL TOTAL CA: CPT | Performed by: STUDENT IN AN ORGANIZED HEALTH CARE EDUCATION/TRAINING PROGRAM

## 2024-12-13 PROCEDURE — 97535 SELF CARE MNGMENT TRAINING: CPT | Mod: CO,GO

## 2024-12-13 PROCEDURE — 93005 ELECTROCARDIOGRAM TRACING: CPT

## 2024-12-13 PROCEDURE — 85027 COMPLETE CBC AUTOMATED: CPT | Performed by: STUDENT IN AN ORGANIZED HEALTH CARE EDUCATION/TRAINING PROGRAM

## 2024-12-13 PROCEDURE — 97116 GAIT TRAINING THERAPY: CPT | Mod: CQ,GP

## 2024-12-13 PROCEDURE — 36415 COLL VENOUS BLD VENIPUNCTURE: CPT | Performed by: STUDENT IN AN ORGANIZED HEALTH CARE EDUCATION/TRAINING PROGRAM

## 2024-12-13 PROCEDURE — 2500000004 HC RX 250 GENERAL PHARMACY W/ HCPCS (ALT 636 FOR OP/ED): Performed by: STUDENT IN AN ORGANIZED HEALTH CARE EDUCATION/TRAINING PROGRAM

## 2024-12-13 PROCEDURE — 97530 THERAPEUTIC ACTIVITIES: CPT | Mod: CO,GO

## 2024-12-13 PROCEDURE — 2500000001 HC RX 250 WO HCPCS SELF ADMINISTERED DRUGS (ALT 637 FOR MEDICARE OP): Performed by: STUDENT IN AN ORGANIZED HEALTH CARE EDUCATION/TRAINING PROGRAM

## 2024-12-13 PROCEDURE — 97535 SELF CARE MNGMENT TRAINING: CPT | Mod: CQ,GP

## 2024-12-13 PROCEDURE — 99238 HOSP IP/OBS DSCHRG MGMT 30/<: CPT | Performed by: INTERNAL MEDICINE

## 2024-12-13 RX ORDER — TRAMADOL HYDROCHLORIDE 50 MG/1
50 TABLET ORAL EVERY 8 HOURS PRN
Qty: 20 TABLET | Refills: 0 | Status: SHIPPED | OUTPATIENT
Start: 2024-12-13

## 2024-12-13 RX ORDER — ASPIRIN 81 MG/1
81 TABLET ORAL 2 TIMES DAILY
Qty: 53 TABLET | Refills: 0 | Status: SHIPPED | OUTPATIENT
Start: 2024-12-13 | End: 2025-01-09

## 2024-12-13 ASSESSMENT — COGNITIVE AND FUNCTIONAL STATUS - GENERAL
DRESSING REGULAR LOWER BODY CLOTHING: TOTAL
DRESSING REGULAR UPPER BODY CLOTHING: A LOT
WALKING IN HOSPITAL ROOM: TOTAL
TOILETING: TOTAL
DRESSING REGULAR UPPER BODY CLOTHING: A LOT
MOVING TO AND FROM BED TO CHAIR: A LOT
HELP NEEDED FOR BATHING: A LOT
TOILETING: TOTAL
CLIMB 3 TO 5 STEPS WITH RAILING: TOTAL
HELP NEEDED FOR BATHING: TOTAL
DAILY ACTIVITIY SCORE: 9
MOBILITY SCORE: 10
DAILY ACTIVITIY SCORE: 12
EATING MEALS: A LOT
TURNING FROM BACK TO SIDE WHILE IN FLAT BAD: A LOT
MOVING FROM LYING ON BACK TO SITTING ON SIDE OF FLAT BED WITH BEDRAILS: A LOT
STANDING UP FROM CHAIR USING ARMS: A LOT
PERSONAL GROOMING: A LOT
EATING MEALS: A LITTLE
PERSONAL GROOMING: A LITTLE
MOVING FROM LYING ON BACK TO SITTING ON SIDE OF FLAT BED WITH BEDRAILS: A LITTLE
DRESSING REGULAR LOWER BODY CLOTHING: TOTAL

## 2024-12-13 ASSESSMENT — PAIN - FUNCTIONAL ASSESSMENT: PAIN_FUNCTIONAL_ASSESSMENT: 0-10

## 2024-12-13 ASSESSMENT — ACTIVITIES OF DAILY LIVING (ADL): HOME_MANAGEMENT_TIME_ENTRY: 15

## 2024-12-13 ASSESSMENT — PAIN SCALES - GENERAL: PAINLEVEL_OUTOF10: 0 - NO PAIN

## 2024-12-13 NOTE — PROGRESS NOTES
Physical Therapy    Physical Therapy Treatment    Patient Name: Julissa Plasencia  MRN: [id number]  Department: [facility]  Room: [address] [place] [address]  Today's Date: 12/13/2024  Time Calculation  Start Time: 0846  Stop Time: 0916  Time Calculation (min): 30 min         Assessment/Plan         PT Plan  Treatment/Interventions: Bed mobility, Transfer training, Gait training, Balance training, Strengthening, Endurance training, Range of motion, Therapeutic exercise, Therapeutic activity, Home exercise program, Positioning  PT Plan: Ongoing PT  PT Frequency: 3 times per week  PT Discharge Recommendations: Moderate intensity level of continued care  PT - OK to Discharge: Yes      General Visit Information:   PT  Visit  PT Received On: 12/13/24       Subjective                  Objective   Pain: no complaints                    Treatments:     Confused    Bed Mobility  Bed Mobility:  (supine to sit mod a x 2   sat unsupported on edge of bed 10 minutes cga)    Ambulation/Gait Training  Ambulation/Gait Training Performed:  (ambulated 2 feet x 2 from bed to chair and chair to toilet using fixed wheeled walker max a x 2   max verbal and manual cues   lob  feet slide forward)  Transfers  Transfer:  (sit to stand max a x 2    toilet to chair stand/pivot max a x 2)         Outcome Measures:  [facility] Basic Mobility  Turning from your back to your side while in a flat bed without using bedrails: A lot  Moving from lying on your back to sitting on the side of a flat bed without using bedrails: A lot  Moving to and from bed to chair (including a wheelchair): A lot  Standing up from a chair using your arms (e.g. wheelchair or bedside chair): A lot  To walk in hospital room: Total  Climbing 3-5 steps with railing: Total  Basic Mobility - Total Score: 10    Education Documentation  Mobility Training, taught by Luisa Jurado PTA at 12/13/2024  9:23 AM.  Learner: Patient  Readiness: Acceptance  Method: Demonstration  Response: Demonstrated  Understanding    Education Comments  No comments found.               Encounter Problems       Encounter Problems (Active)       PT Problem       PT Goal 1 STG - Pt will transition supine <> sitting with ModA x1  (Progressing)       Start:  12/12/24    Expected End:  12/26/24            PT Goal 2 STG - Pt will transfer STS with ModA x1  (Progressing)       Start:  12/12/24    Expected End:  12/26/24            PT Goal 3 STG - Pt will amb 25' using FWW with Larisa  (Progressing)       Start:  12/12/24    Expected End:  12/26/24            PT Goal 4 STG - Pt will perform a B LE ther ex program of 2-3 sets of 10  (Progressing)       Start:  12/12/24    Expected End:  12/26/24               Pain - Adult

## 2024-12-13 NOTE — PROGRESS NOTES
Occupational Therapy    OT Treatment    Patient Name: Julissa Plasencia  MRN: [id number]  Department: [facility]  Room: 72/729-B  Today's Date: 12/13/2024  Time Calculation  Start Time: 0843  Stop Time: 0910  Time Calculation (min): 27 min        Assessment:  End of Session Communication: PCT/NA/CTA  End of Session Patient Position: Up in chair, Alarm on     Plan:  Treatment Interventions: ADL retraining, Functional transfer training, UE strengthening/ROM, Endurance training, Equipment evaluation/education, Compensatory technique education, Fine motor coordination activities  OT Frequency: 3 times per week  OT Discharge Recommendations: Moderate intensity level of continued care  OT - OK to Discharge: Yes (once medically appropriate to next level of care)  Treatment Interventions: ADL retraining, Functional transfer training, UE strengthening/ROM, Endurance training, Equipment evaluation/education, Compensatory technique education, Fine motor coordination activities    Subjective   Previous Visit Info:  OT Last Visit  OT Received On: 12/13/24  General:  General  Co-Treatment: PT  Co-Treatment Reason: for safety and to maximize therapeutic performance  Prior to Session Communication: Bedside nurse  Patient Position Received: Bed, 2 rail up, Alarm on  General Comment: pleasantly confused and in agreement to complete therapy session  Precautions:  LE Weight Bearing Status: Weight Bearing as Tolerated  Medical Precautions: Fall precautions            Pain:  Pain Assessment  Pain Assessment:  (c/o pain in L LE with WB but unable to rate on pain scale)    Objective    Cognition:  Cognition  Orientation Level: Disoriented to place, Disoriented to time, Disoriented to situation       Activities of Daily Living: Grooming  Grooming Level of Assistance: Minimum assistance  Grooming Where Assessed: Chair  Grooming Comments: pt able to don dentures after orientation and brush hair    LE Dressing  LE Dressing: Yes  Adult Briefs Level of  Assistance: Maximum assistance (x2)  LE Dressing Where Assessed: Bedside commode    Toileting  Toileting Level of Assistance: Maximum assistance (x2)  Where Assessed: Bedside commode  Functional Standing Tolerance:  Time: less than :30  Bed Mobility/Transfers: Bed Mobility  Bed Mobility: Yes  Bed Mobility 1  Bed Mobility 1: Supine to sitting  Level of Assistance 1: Moderate assistance, +2    Transfers  Transfer: Yes  Transfer 1  Technique 1: Sit to stand, Stand to sit  Transfer Device 1: Walker  Transfer Level of Assistance 1: Maximum assistance, +2    Toilet Transfers  Toilet Transfer From: Rolling walker  Toilet Transfer Type: To and from  Toilet Transfer to: Standard bedside commode  Toilet Transfer Technique: Stand pivot  Toilet Transfers: Maximal assistance (x2)    Functional Mobility:  Functional Mobility  Functional Mobility Performed: Yes  Functional Mobility 1  Device 1: Rolling walker  Assistance 1: Maximum assistance (x2)  Comments 1: pt able to take a couple steps from EOB to commode and from commode to chair with arms      Outcome Measures:[facility] Daily Activity  Putting on and taking off regular lower body clothing: Total  Bathing (including washing, rinsing, drying): A lot  Putting on and taking off regular upper body clothing: A lot  Toileting, which includes using toilet, bedpan or urinal: Total  Taking care of personal grooming such as brushing teeth: A little  Eating Meals: A little  Daily Activity - Total Score: 12        Education Documentation  Body Mechanics, taught by RIANA Quevedo at 12/13/2024  2:18 PM.  Learner: Patient  Readiness: Acceptance  Method: Explanation  Response: Verbalizes Understanding, Needs Reinforcement    Precautions, taught by RIANA Quevedo at 12/13/2024  2:18 PM.  Learner: Patient  Readiness: Acceptance  Method: Explanation  Response: Verbalizes Understanding, Needs Reinforcement    ADL Training, taught by RIANA Quevedo at 12/13/2024  2:18  PM.  Learner: Patient  Readiness: Acceptance  Method: Explanation  Response: Verbalizes Understanding, Needs Reinforcement    Education Comments  No comments found.               Goals:  Encounter Problems       Encounter Problems (Active)       OT Goals       STG- patient will complete LB dressing at MOD A with use of ae/ad/dme prn  (Progressing)       Start:  12/12/24    Expected End:  12/26/24            STG- patient will complete toileting at MOD A with use of ae/ad/dme prn (Progressing)       Start:  12/12/24    Expected End:  12/26/24            STG- patient will complete transfers to/from bed, chair, commode with MIN A with use of ae/ad/dme prn (Progressing)       Start:  12/12/24    Expected End:  12/26/24            STG- patient will complete simple mobility with MOD A with use of ae/ad/dme prn (Progressing)       Start:  12/12/24    Expected End:  12/26/24            STG- patient will complete grooming with CGA with use of ae/ad/dme prn (Progressing)       Start:  12/12/24    Expected End:  12/26/24

## 2024-12-13 NOTE — CARE PLAN
The patient's goals for the shift include  comfort and safety    The clinical goals for the shift include maintain comfort and safety

## 2024-12-13 NOTE — CARE PLAN
Problem: Pain - Adult  Goal: Verbalizes/displays adequate comfort level or baseline comfort level  Outcome: Progressing     Problem: Safety - Adult  Goal: Free from fall injury  Outcome: Progressing     Problem: Discharge Planning  Goal: Discharge to home or other facility with appropriate resources  Outcome: Progressing     Problem: Chronic Conditions and Co-morbidities  Goal: Patient's chronic conditions and co-morbidity symptoms are monitored and maintained or improved  Outcome: Progressing     Problem: Skin  Goal: Decreased wound size/increased tissue granulation at next dressing change  Outcome: Progressing  Flowsheets (Taken 12/13/2024 1447)  Decreased wound size/increased tissue granulation at next dressing change: Promote sleep for wound healing  Goal: Participates in plan/prevention/treatment measures  Outcome: Progressing  Flowsheets (Taken 12/13/2024 1447)  Participates in plan/prevention/treatment measures: Elevate heels  Goal: Promote skin healing  Outcome: Progressing  Flowsheets (Taken 12/13/2024 1447)  Promote skin healing: Turn/reposition every 2 hours/use positioning/transfer devices     Problem: Nutrition  Goal: Oral intake greater than 50%  Outcome: Progressing  Goal: Oral intake greater 75%  Outcome: Progressing  Goal: Consume prescribed supplement  Outcome: Progressing  Goal: Adequate PO fluid intake  Outcome: Progressing  Goal: BG  mg/dL  Outcome: Progressing  Goal: Electrolytes WNL  Outcome: Progressing  Goal: Promote healing  Outcome: Progressing  Goal: Maintain stable weight  Outcome: Progressing   The patient's goals for the shift include      The clinical goals for the shift include comfort & safety

## 2024-12-13 NOTE — PROGRESS NOTES
"ORTHOPAEDIC SURGERY POST-OP PROGRESS NOTE       SERVICE DATE: 12/13/2024   SERVICE TIME:  10:31 AM    Subjective   Seen and examined at bedside, resting comfortably, confused.  Left hip pain appears conntrolled.  No overnight issues.        Objective   VITAL SIGNS: /60 (BP Location: Left arm, Patient Position: Lying)   Pulse 102   Temp 37 °C (98.6 °F) (Temporal)   Resp 18   Ht 1.6 m (5' 2.99\")   Wt 45.4 kg (100 lb 1.4 oz)   SpO2 97%   BMI 17.73 kg/m²   INTAKE AND OUTPUT:     Intake/Output Summary (Last 24 hours) at 12/13/2024 1031  Last data filed at 12/13/2024 0945  Gross per 24 hour   Intake 60 ml   Output --   Net 60 ml            PHYSICAL EXAMINATION:  Left Lower Extremity:    Dorsalis pedis pulses palpable.    Posterior tibial pulses palpable.    Dorsi flexion 5/5.    Plantar flexion 5/5.    Extensor hallucis extension: 5/5.    Sensory intact to light touch L1-S1.    Dressing intact.    Surgical site no drainage.       Problem Review and Assessment: Patient monitored, no new events overnight.    Patient Active Problem List   Diagnosis    Hypertension, essential    Confusion    Occlusion and stenosis of left carotid artery    Vitamin D deficiency    Headaches due to old head injury    Weakness    Acute pain of right knee    Fall    Rib pain    Other closed fracture of left pubis, initial encounter    Slow transit constipation    Primary insomnia    Closed fracture of left hip, initial encounter       LABS:  Results for orders placed or performed during the hospital encounter of 12/11/24 (from the past 24 hours)   CBC   Result Value Ref Range    WBC 12.7 (H) 4.4 - 11.3 x10*3/uL    nRBC 0.0 0.0 - 0.0 /100 WBCs    RBC 3.20 (L) 4.00 - 5.20 x10*6/uL    Hemoglobin 9.8 (L) 12.0 - 16.0 g/dL    Hematocrit 30.6 (L) 36.0 - 46.0 %    MCV 96 80 - 100 fL    MCH 30.6 26.0 - 34.0 pg    MCHC 32.0 32.0 - 36.0 g/dL    RDW 13.3 11.5 - 14.5 %    Platelets 183 150 - 450 x10*3/uL   Basic metabolic panel   Result Value Ref " Range    Glucose 87 74 - 99 mg/dL    Sodium 133 (L) 136 - 145 mmol/L    Potassium 4.5 3.5 - 5.3 mmol/L    Chloride 96 (L) 98 - 107 mmol/L    Bicarbonate 29 21 - 32 mmol/L    Anion Gap 13 10 - 20 mmol/L    Urea Nitrogen 20 6 - 23 mg/dL    Creatinine 0.52 0.50 - 1.05 mg/dL    eGFR 86 >60 mL/min/1.73m*2    Calcium 8.9 8.6 - 10.3 mg/dL       POST OPERATIVE COMPLICATIONS:  Complicated by: None         ASSESSMENT:   S/P Procedure(s) (LRB):  ORIF LEFT HIP FRACTURE WITH C-ARM (Left) on 12/11/2024    POST-OP PLAN:   Physical Therapy evaluation - WBAT LLE  DVT prophylaxis - ASA 81mg BID x 28 days , IPDs  Dressing - Mepilex x 7 days  Pain control - Multimodal  Antibiotics - Completed   Case Management for discharge planning - Plan for discharge to SNF vs. Home with [facility]  Follow up outpatient in 2 weeks for repeat radiographs and wound check/staple removal     Ayush Junior, DO  Orthopaedic Surgery  Sports Medicine & Shoulder  [facility]   [phone]

## 2024-12-13 NOTE — DOCUMENTATION CLARIFICATION NOTE
"    PATIENT:               SOILA LÓPEZ  ACCT #:                  [id number]  MRN:                       [id number]  :                       3/1/1929  ADMIT DATE:       2024 2:46 AM  DISCH DATE:  RESPONDING PROVIDER #:        [id number]          PROVIDER RESPONSE TEXT:    I agree with dietician diagnosis of Underweight on 2024    CDI QUERY TEXT:    Clarification        Instruction:    Based on your assessment of the patient and the clinical information, please provide the requested documentation by clicking on the appropriate radio button and enter any additional information if prompted.    Question: Please further clarify this patient nutritional status as    When answering this query, please exercise your independent professional judgment. The fact that a question is being asked, does not imply that any particular answer is desired or expected.    The patient's clinical indicators include:  Clinical Information:  89 year old female presented with a left hip fracture.    A Nutrition Consult was done on w 2024.    Clinical Indicators:  2024  Nutrition Consult:  \"Pt is underweight based on BMI of 17.7\"    Treatment:  - Nutrition Consult  - Supplements:  Ensure Plus High Protein BID, Magic cup daily    Risk Factors:  89 year old with dementia, s/p hip fracture with repair  Options provided:  -- I agree with dietician diagnosis of Underweight on 2024  -- Other - I will add my own diagnosis  -- Refer to Clinical Documentation Reviewer    Query created by: Darling Rubio on 2024 9:19 AM      Electronically signed by:  ROBERT BYNUM DO 2024 10:35 AM          "

## 2024-12-13 NOTE — PROGRESS NOTES
Call placed to DON Fiordaliza at [facility] to discuss discharge plans. VM left.      12/13 1000  Spoke with Fiordaliza Patient is going to return to [facility] with acute PT /OT. Patient will have a hospital bed. Provider notified. Patient medically ready.      I will START or STAY ON the medications listed below when I get home from the hospital:  None

## 2024-12-15 LAB
ATRIAL RATE: 75 BPM
P AXIS: 82 DEGREES
PR INTERVAL: 201 MS
Q ONSET: 252 MS
QRS COUNT: 12 BEATS
QRS DURATION: 114 MS
QT INTERVAL: 445 MS
QTC CALCULATION(BAZETT): 517 MS
QTC FREDERICIA: 491 MS
R AXIS: 59 DEGREES
T AXIS: 36 DEGREES
T OFFSET: 474 MS
VENTRICULAR RATE: 81 BPM

## 2024-12-15 NOTE — DISCHARGE SUMMARY
Discharge Diagnosis  Closed fracture of left hip, initial encounter    Issues Requiring Follow-Up  Fracture     Discharge Meds     Medication List      START taking these medications     aspirin 81 mg EC tablet; Take 1 tablet (81 mg) by mouth 2 times a day   for 53 doses.   traMADol 50 mg tablet; Commonly known as: Ultram; Take 1 tablet (50 mg)   by mouth every 8 hours if needed for moderate pain (4 - 6).     CHANGE how you take these medications     acetaminophen 325 mg tablet; Commonly known as: Tylenol; What changed:   Another medication with the same name was removed. Continue taking this   medication, and follow the directions you see here.   polyethylene glycol 17 gram packet; Commonly known as: Glycolax,   Miralax; Take 17 g by mouth once daily.     CONTINUE taking these medications     cholecalciferol 50 mcg (2,000 unit) capsule; Commonly known as: Vitamin   D-3   diphenhydrAMINE-acetaminophen  mg per tablet; Commonly known as:   Tylenol PM   melatonin 5 mg tablet   metoprolol succinate XL 50 mg 24 hr tablet; Commonly known as:   Toprol-XL; TAKE 1 TABLET BY MOUTH ONCE  DAILY       Test Results Pending At Discharge  Pending Labs       No current pending labs.            Hospital Course   Left hip fracture  Leukocytosis  Admit to F  Appreciate ortho recs  NPO  Morphine  Bedrest  Add ck  Add urine cx  Check covid  Am CBC, BMP  Leukocytosis may be a result of trauma, will hold antibiotics at this time.     Prolonged QT  Daily EKGs     Suspected Lung nodule  Consider CT chest        Chronic conditions: Hypertension, headaches  Continue home medications as listed above     DVT prophylaxis  SCDs  No chemical prophylaxis until evaluated by Ortho     Patient's Revised Cardiac Risk Index is 0. CXR reviewed, CT chest ordered but overall stable from respiratory stand point. Patient is medically cleared for surgery.      12/12: S/p ORIF left hip. Hb trending down, will monitor. Leukocytosis improved from 20 down  to 14. Continue to monitor off antibiotics as there have been no fevers. PT/OT eval pending. Anticipate patient will need SNF.     12/13: Patient discharged back to [place].    Pertinent Physical Exam At Time of Discharge  Physical Exam  Constitutional:       Comments: thin   HENT:      Head: Normocephalic.      Ears:      Comments: Left forehead hematoma/abrasion noted     Nose: Nose normal.      Mouth/Throat:      Mouth: Mucous membranes are dry.   Eyes:      Conjunctiva/sclera: Conjunctivae normal.   Cardiovascular:      Rate and Rhythm: Normal rate and regular rhythm.      Heart sounds: Normal heart sounds.   Pulmonary:      Effort: Pulmonary effort is normal.      Breath sounds: Normal breath sounds.      Comments: Anteriorly  Abdominal:      General: Bowel sounds are normal.      Palpations: Abdomen is soft.      Tenderness: There is no abdominal tenderness.   Musculoskeletal:      Cervical back: Neck supple.      Comments: Decreased range of motion to left hip, circulation movement sensation intact to left toes   Skin:     General: Skin is warm.      Comments: See head assessment   Neurological:      Mental Status: She is alert. Mental status is at baseline.   Psychiatric:         Mood and Affect: Mood normal.      Outpatient Follow-Up  No future appointments.      Andrew Alamo, DO

## 2024-12-16 LAB
ATRIAL RATE: 120 BPM
PR INTERVAL: 160 MS
Q ONSET: 207 MS
QRS COUNT: 25 BEATS
QRS DURATION: 100 MS
QT INTERVAL: 334 MS
QTC CALCULATION(BAZETT): 524 MS
QTC FREDERICIA: 451 MS
R AXIS: -12 DEGREES
T AXIS: 24 DEGREES
T OFFSET: 374 MS
VENTRICULAR RATE: 148 BPM

## 2024-12-18 ENCOUNTER — NURSING HOME VISIT (OUTPATIENT)
Dept: POST ACUTE CARE | Facility: EXTERNAL LOCATION | Age: 89
End: 2024-12-18
Payer: MEDICARE

## 2024-12-18 DIAGNOSIS — I10 HYPERTENSION, ESSENTIAL: ICD-10-CM

## 2024-12-18 DIAGNOSIS — Z87.81 S/P LEFT HIP FRACTURE: Primary | ICD-10-CM
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
## 2024-12-18 LAB
ATRIAL RATE: 111 BPM
P AXIS: 50 DEGREES
P OFFSET: 162 MS
P ONSET: 143 MS
PR INTERVAL: 146 MS
Q ONSET: 216 MS
QRS COUNT: 24 BEATS
QRS DURATION: 84 MS
QT INTERVAL: 340 MS
QTC CALCULATION(BAZETT): 526 MS
QTC FREDERICIA: 455 MS
R AXIS: 22 DEGREES
T AXIS: 31 DEGREES
T OFFSET: 386 MS
VENTRICULAR RATE: 144 BPM

## 2024-12-18 PROCEDURE — 99348 HOME/RES VST EST LOW MDM 30: CPT

## 2024-12-18 NOTE — LETTER
Patient: Julissa Plasencia  : 3/1/1929    Encounter Date: 2024    PROGRESS NOTE    Subjective  Chief complaint: Julissa Plasencia is a 89 y.o. female who is an assisted living facility patient being seen and evaluated for general medical care and monthly follow-up    HPI:  Patient seen and evaluated for general medical care monthly follow-up.  Patient at baseline mentation, pleasant, cooperative.  Recent left hip fracture with surgical repair - left lateral hip staples in place.  Incision line CDI with surrounding ecchymosis.  Nursing reports multiple falls recently as patient tries to get up without assistance.  Pain managed with current therapy.  Safety measures in place.      Objective  Vital signs:   142/64, 96.5, 94, 80, 16, 98%    Physical Exam  Constitutional:       Appearance: Normal appearance.   HENT:      Head: Normocephalic.      Mouth/Throat:      Mouth: Mucous membranes are moist.   Eyes:      Extraocular Movements: Extraocular movements intact.   Cardiovascular:      Rate and Rhythm: Normal rate and regular rhythm.      Pulses: Normal pulses.      Heart sounds: Normal heart sounds.   Pulmonary:      Effort: Pulmonary effort is normal.      Breath sounds: Normal breath sounds.   Abdominal:      General: Bowel sounds are normal.      Palpations: Abdomen is soft.   Musculoskeletal:         General: Normal range of motion.      Cervical back: Normal range of motion and neck supple.      Comments:  left lateral hip incision CDI - staples intact   Skin:     General: Skin is warm and dry.      Capillary Refill: Capillary refill takes less than 2 seconds.   Neurological:      Mental Status: She is alert. Mental status is at baseline.   Psychiatric:         Mood and Affect: Mood normal.         Behavior: Behavior normal.         Assessment/Plan  Problem List Items Addressed This Visit       Hypertension, essential      stable   Continue Metoprolol   monitor           Fall      multiple falls recently   Reinforced  calling for assistance without trying to get up myself   Patient to be in common area during day with frequent checks at night   bed alarms   maintain adequate lighting, proper footwear, clear pathways         S/p left hip fracture - Primary      incision CDI - staples intact   pain management   Therapy   WBAT          Medications, treatments, and labs reviewed  Continue medications and treatments as listed in EMR    MEETA Newton      Electronically Signed By: MEETA Newton   12/29/24  6:20 PM

## 2024-12-25 DIAGNOSIS — I10 HYPERTENSION, ESSENTIAL: ICD-10-CM

## 2024-12-26 RX ORDER — METOPROLOL SUCCINATE 50 MG/1
50 TABLET, EXTENDED RELEASE ORAL DAILY
Qty: 90 TABLET | Refills: 3 | OUTPATIENT
Start: 2025-01-21

## 2024-12-29 PROBLEM — Z87.81 S/P LEFT HIP FRACTURE: Status: ACTIVE | Noted: 2024-12-29

## 2024-12-29 NOTE — ASSESSMENT & PLAN NOTE
multiple falls recently   Reinforced calling for assistance without trying to get up myself   Patient to be in common area during day with frequent checks at night   bed alarms   maintain adequate lighting, proper footwear, clear pathways

## 2024-12-29 NOTE — PROGRESS NOTES
PROGRESS NOTE    Subjective   Chief complaint: Julissa Plasencia is a 89 y.o. female who is an assisted living facility patient being seen and evaluated for general medical care and monthly follow-up    HPI:  Patient seen and evaluated for general medical care monthly follow-up.  Patient at baseline mentation, pleasant, cooperative.  Recent left hip fracture with surgical repair - left lateral hip staples in place.  Incision line CDI with surrounding ecchymosis.  Nursing reports multiple falls recently as patient tries to get up without assistance.  Pain managed with current therapy.  Safety measures in place.      Objective   Vital signs:   142/64, 96.5, 94, 80, 16, 98%    Physical Exam  Constitutional:       Appearance: Normal appearance.   HENT:      Head: Normocephalic.      Mouth/Throat:      Mouth: Mucous membranes are moist.   Eyes:      Extraocular Movements: Extraocular movements intact.   Cardiovascular:      Rate and Rhythm: Normal rate and regular rhythm.      Pulses: Normal pulses.      Heart sounds: Normal heart sounds.   Pulmonary:      Effort: Pulmonary effort is normal.      Breath sounds: Normal breath sounds.   Abdominal:      General: Bowel sounds are normal.      Palpations: Abdomen is soft.   Musculoskeletal:         General: Normal range of motion.      Cervical back: Normal range of motion and neck supple.      Comments:  left lateral hip incision CDI - staples intact   Skin:     General: Skin is warm and dry.      Capillary Refill: Capillary refill takes less than 2 seconds.   Neurological:      Mental Status: She is alert. Mental status is at baseline.   Psychiatric:         Mood and Affect: Mood normal.         Behavior: Behavior normal.         Assessment/Plan   Problem List Items Addressed This Visit       Hypertension, essential      stable   Continue Metoprolol   monitor           Fall      multiple falls recently   Reinforced calling for assistance without trying to get up myself   Patient  to be in common area during day with frequent checks at night   bed alarms   maintain adequate lighting, proper footwear, clear pathways         S/p left hip fracture - Primary      incision CDI - staples intact   pain management   Therapy   WBAT          Medications, treatments, and labs reviewed  Continue medications and treatments as listed in EMR    Eli Sanchez, APRN-CNP

## 2025-03-14 ENCOUNTER — APPOINTMENT (OUTPATIENT)
Dept: RADIOLOGY | Facility: HOSPITAL | Age: OVER 89
DRG: 481 | End: 2025-03-14
Payer: MEDICARE

## 2025-03-14 ENCOUNTER — APPOINTMENT (OUTPATIENT)
Dept: CARDIOLOGY | Facility: HOSPITAL | Age: OVER 89
DRG: 481 | End: 2025-03-14
Payer: MEDICARE

## 2025-03-14 ENCOUNTER — HOSPITAL ENCOUNTER (INPATIENT)
Facility: HOSPITAL | Age: OVER 89
DRG: 481 | End: 2025-03-14
Attending: EMERGENCY MEDICINE | Admitting: INTERNAL MEDICINE
Payer: MEDICARE
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
## 2025-03-14 DIAGNOSIS — I48.91 UNSPECIFIED ATRIAL FIBRILLATION (MULTI): ICD-10-CM

## 2025-03-14 DIAGNOSIS — I10 HYPERTENSION, ESSENTIAL: ICD-10-CM

## 2025-03-14 DIAGNOSIS — S72.141A CLOSED INTERTROCHANTERIC FRACTURE OF RIGHT FEMUR, INITIAL ENCOUNTER: ICD-10-CM

## 2025-03-14 DIAGNOSIS — I48.0 AF (PAROXYSMAL ATRIAL FIBRILLATION) (MULTI): ICD-10-CM

## 2025-03-14 DIAGNOSIS — S72.141A CLOSED INTERTROCHANTERIC FRACTURE OF HIP, RIGHT, INITIAL ENCOUNTER: ICD-10-CM

## 2025-03-14 PROBLEM — R94.31 PROLONGED Q-T INTERVAL ON ECG: Status: ACTIVE | Noted: 2025-03-14

## 2025-03-14 PROBLEM — D72.829 LEUKOCYTOSIS: Status: ACTIVE | Noted: 2025-03-14

## 2025-03-14 PROBLEM — E46 PROTEIN CALORIE MALNUTRITION (MULTI): Status: ACTIVE | Noted: 2025-03-14

## 2025-03-14 LAB
ALBUMIN SERPL BCP-MCNC: 3.8 G/DL (ref 3.4–5)
ALP SERPL-CCNC: 128 U/L (ref 33–136)
ALT SERPL W P-5'-P-CCNC: 12 U/L (ref 7–45)
ANION GAP SERPL CALC-SCNC: 14 MMOL/L (ref 10–20)
APPEARANCE UR: CLEAR
AST SERPL W P-5'-P-CCNC: 17 U/L (ref 9–39)
BASOPHILS # BLD AUTO: 0.03 X10*3/UL (ref 0–0.1)
BASOPHILS NFR BLD AUTO: 0.2 %
BILIRUB SERPL-MCNC: 0.4 MG/DL (ref 0–1.2)
BILIRUB UR STRIP.AUTO-MCNC: NEGATIVE MG/DL
BUN SERPL-MCNC: 11 MG/DL (ref 6–23)
CALCIUM SERPL-MCNC: 9.2 MG/DL (ref 8.6–10.3)
CARDIAC TROPONIN I PNL SERPL HS: 15 NG/L (ref 0–13)
CARDIAC TROPONIN I PNL SERPL HS: 17 NG/L (ref 0–13)
CARDIAC TROPONIN I PNL SERPL HS: 18 NG/L (ref 0–13)
CHLORIDE SERPL-SCNC: 97 MMOL/L (ref 98–107)
CO2 SERPL-SCNC: 28 MMOL/L (ref 21–32)
COLOR UR: NORMAL
CREAT SERPL-MCNC: 0.52 MG/DL (ref 0.5–1.05)
EGFRCR SERPLBLD CKD-EPI 2021: 85 ML/MIN/1.73M*2
EOSINOPHIL # BLD AUTO: 0 X10*3/UL (ref 0–0.4)
EOSINOPHIL NFR BLD AUTO: 0 %
ERYTHROCYTE [DISTWIDTH] IN BLOOD BY AUTOMATED COUNT: 13.7 % (ref 11.5–14.5)
FLUAV RNA RESP QL NAA+PROBE: NOT DETECTED
FLUBV RNA RESP QL NAA+PROBE: NOT DETECTED
GLUCOSE SERPL-MCNC: 148 MG/DL (ref 74–99)
GLUCOSE UR STRIP.AUTO-MCNC: NORMAL MG/DL
HCT VFR BLD AUTO: 35.1 % (ref 36–46)
HGB BLD-MCNC: 11.4 G/DL (ref 12–16)
HOLD SPECIMEN: NORMAL
IMM GRANULOCYTES # BLD AUTO: 0.08 X10*3/UL (ref 0–0.5)
IMM GRANULOCYTES NFR BLD AUTO: 0.6 % (ref 0–0.9)
KETONES UR STRIP.AUTO-MCNC: NEGATIVE MG/DL
LEUKOCYTE ESTERASE UR QL STRIP.AUTO: NEGATIVE
LYMPHOCYTES # BLD AUTO: 0.69 X10*3/UL (ref 0.8–3)
LYMPHOCYTES NFR BLD AUTO: 5.2 %
MAGNESIUM SERPL-MCNC: 1.6 MG/DL (ref 1.6–2.4)
MCH RBC QN AUTO: 30.5 PG (ref 26–34)
MCHC RBC AUTO-ENTMCNC: 32.5 G/DL (ref 32–36)
MCV RBC AUTO: 94 FL (ref 80–100)
MONOCYTES # BLD AUTO: 0.75 X10*3/UL (ref 0.05–0.8)
MONOCYTES NFR BLD AUTO: 5.7 %
NEUTROPHILS # BLD AUTO: 11.72 X10*3/UL (ref 1.6–5.5)
NEUTROPHILS NFR BLD AUTO: 88.3 %
NITRITE UR QL STRIP.AUTO: NEGATIVE
NRBC BLD-RTO: 0 /100 WBCS (ref 0–0)
PH UR STRIP.AUTO: 7 [PH]
PLATELET # BLD AUTO: 330 X10*3/UL (ref 150–450)
POTASSIUM SERPL-SCNC: 3.9 MMOL/L (ref 3.5–5.3)
PROT SERPL-MCNC: 6.9 G/DL (ref 6.4–8.2)
PROT UR STRIP.AUTO-MCNC: NORMAL MG/DL
RBC # BLD AUTO: 3.74 X10*6/UL (ref 4–5.2)
RBC # UR STRIP.AUTO: NEGATIVE MG/DL
RBC #/AREA URNS AUTO: ABNORMAL /HPF
SARS-COV-2 RNA RESP QL NAA+PROBE: NOT DETECTED
SODIUM SERPL-SCNC: 135 MMOL/L (ref 136–145)
SP GR UR STRIP.AUTO: 1.01
UROBILINOGEN UR STRIP.AUTO-MCNC: NORMAL MG/DL
WBC # BLD AUTO: 13.3 X10*3/UL (ref 4.4–11.3)
WBC #/AREA URNS AUTO: ABNORMAL /HPF

## 2025-03-14 PROCEDURE — 73502 X-RAY EXAM HIP UNI 2-3 VIEWS: CPT | Mod: RIGHT SIDE | Performed by: RADIOLOGY

## 2025-03-14 PROCEDURE — 85025 COMPLETE CBC W/AUTO DIFF WBC: CPT | Performed by: EMERGENCY MEDICINE

## 2025-03-14 PROCEDURE — 36415 COLL VENOUS BLD VENIPUNCTURE: CPT | Performed by: NURSE PRACTITIONER

## 2025-03-14 PROCEDURE — 93005 ELECTROCARDIOGRAM TRACING: CPT

## 2025-03-14 PROCEDURE — 84484 ASSAY OF TROPONIN QUANT: CPT | Performed by: EMERGENCY MEDICINE

## 2025-03-14 PROCEDURE — 96374 THER/PROPH/DIAG INJ IV PUSH: CPT | Mod: 59

## 2025-03-14 PROCEDURE — 80053 COMPREHEN METABOLIC PANEL: CPT | Performed by: EMERGENCY MEDICINE

## 2025-03-14 PROCEDURE — 84484 ASSAY OF TROPONIN QUANT: CPT | Performed by: NURSE PRACTITIONER

## 2025-03-14 PROCEDURE — 73502 X-RAY EXAM HIP UNI 2-3 VIEWS: CPT | Mod: RT

## 2025-03-14 PROCEDURE — 72125 CT NECK SPINE W/O DYE: CPT | Performed by: STUDENT IN AN ORGANIZED HEALTH CARE EDUCATION/TRAINING PROGRAM

## 2025-03-14 PROCEDURE — 2500000004 HC RX 250 GENERAL PHARMACY W/ HCPCS (ALT 636 FOR OP/ED): Performed by: EMERGENCY MEDICINE

## 2025-03-14 PROCEDURE — 99223 1ST HOSP IP/OBS HIGH 75: CPT | Performed by: NURSE PRACTITIONER

## 2025-03-14 PROCEDURE — 1200000002 HC GENERAL ROOM WITH TELEMETRY DAILY

## 2025-03-14 PROCEDURE — 83735 ASSAY OF MAGNESIUM: CPT | Performed by: NURSE PRACTITIONER

## 2025-03-14 PROCEDURE — 71045 X-RAY EXAM CHEST 1 VIEW: CPT

## 2025-03-14 PROCEDURE — 72125 CT NECK SPINE W/O DYE: CPT

## 2025-03-14 PROCEDURE — 51798 US URINE CAPACITY MEASURE: CPT

## 2025-03-14 PROCEDURE — 81001 URINALYSIS AUTO W/SCOPE: CPT | Performed by: EMERGENCY MEDICINE

## 2025-03-14 PROCEDURE — 2500000001 HC RX 250 WO HCPCS SELF ADMINISTERED DRUGS (ALT 637 FOR MEDICARE OP): Performed by: NURSE PRACTITIONER

## 2025-03-14 PROCEDURE — 36415 COLL VENOUS BLD VENIPUNCTURE: CPT | Performed by: EMERGENCY MEDICINE

## 2025-03-14 PROCEDURE — 99221 1ST HOSP IP/OBS SF/LOW 40: CPT | Performed by: ORTHOPAEDIC SURGERY

## 2025-03-14 PROCEDURE — 99285 EMERGENCY DEPT VISIT HI MDM: CPT | Mod: 25 | Performed by: EMERGENCY MEDICINE

## 2025-03-14 PROCEDURE — 51702 INSERT TEMP BLADDER CATH: CPT

## 2025-03-14 PROCEDURE — 71045 X-RAY EXAM CHEST 1 VIEW: CPT | Mod: FOREIGN READ | Performed by: RADIOLOGY

## 2025-03-14 PROCEDURE — 70450 CT HEAD/BRAIN W/O DYE: CPT

## 2025-03-14 PROCEDURE — 96375 TX/PRO/DX INJ NEW DRUG ADDON: CPT | Mod: 59

## 2025-03-14 PROCEDURE — 2500000004 HC RX 250 GENERAL PHARMACY W/ HCPCS (ALT 636 FOR OP/ED): Performed by: NURSE PRACTITIONER

## 2025-03-14 PROCEDURE — 70450 CT HEAD/BRAIN W/O DYE: CPT | Performed by: STUDENT IN AN ORGANIZED HEALTH CARE EDUCATION/TRAINING PROGRAM

## 2025-03-14 PROCEDURE — 87636 SARSCOV2 & INF A&B AMP PRB: CPT | Performed by: NURSE PRACTITIONER

## 2025-03-14 RX ORDER — POLYETHYLENE GLYCOL 3350 17 G/17G
17 POWDER, FOR SOLUTION ORAL DAILY
Status: DISCONTINUED | OUTPATIENT
Start: 2025-03-14 | End: 2025-03-20 | Stop reason: HOSPADM

## 2025-03-14 RX ORDER — ACETAMINOPHEN 325 MG/1
650 TABLET ORAL EVERY 6 HOURS PRN
Status: DISCONTINUED | OUTPATIENT
Start: 2025-03-14 | End: 2025-03-20 | Stop reason: HOSPADM

## 2025-03-14 RX ORDER — HEPARIN SODIUM 5000 [USP'U]/ML
5000 INJECTION, SOLUTION INTRAVENOUS; SUBCUTANEOUS EVERY 8 HOURS
Status: DISCONTINUED | OUTPATIENT
Start: 2025-03-14 | End: 2025-03-15

## 2025-03-14 RX ORDER — MORPHINE SULFATE 4 MG/ML
4 INJECTION, SOLUTION INTRAMUSCULAR; INTRAVENOUS ONCE
Status: COMPLETED | OUTPATIENT
Start: 2025-03-14 | End: 2025-03-14

## 2025-03-14 RX ORDER — MORPHINE SULFATE 2 MG/ML
1 INJECTION, SOLUTION INTRAMUSCULAR; INTRAVENOUS EVERY 4 HOURS PRN
Status: DISCONTINUED | OUTPATIENT
Start: 2025-03-14 | End: 2025-03-15

## 2025-03-14 RX ORDER — ONDANSETRON HYDROCHLORIDE 2 MG/ML
4 INJECTION, SOLUTION INTRAVENOUS ONCE
Status: COMPLETED | OUTPATIENT
Start: 2025-03-14 | End: 2025-03-14

## 2025-03-14 RX ORDER — METOPROLOL SUCCINATE 50 MG/1
50 TABLET, EXTENDED RELEASE ORAL DAILY
Status: DISCONTINUED | OUTPATIENT
Start: 2025-03-14 | End: 2025-03-20 | Stop reason: HOSPADM

## 2025-03-14 RX ORDER — ACETAMINOPHEN 500 MG
5 TABLET ORAL NIGHTLY
Status: DISCONTINUED | OUTPATIENT
Start: 2025-03-14 | End: 2025-03-20 | Stop reason: HOSPADM

## 2025-03-14 RX ORDER — TRAMADOL HYDROCHLORIDE 50 MG/1
50 TABLET ORAL EVERY 8 HOURS PRN
Status: DISCONTINUED | OUTPATIENT
Start: 2025-03-14 | End: 2025-03-15

## 2025-03-14 RX ADMIN — Medication 5 MG: at 21:19

## 2025-03-14 RX ADMIN — ONDANSETRON 4 MG: 2 INJECTION INTRAMUSCULAR; INTRAVENOUS at 12:02

## 2025-03-14 RX ADMIN — MORPHINE SULFATE 4 MG: 4 INJECTION, SOLUTION INTRAMUSCULAR; INTRAVENOUS at 12:02

## 2025-03-14 RX ADMIN — HEPARIN SODIUM 5000 UNITS: 5000 INJECTION, SOLUTION INTRAVENOUS; SUBCUTANEOUS at 17:32

## 2025-03-14 RX ADMIN — TRAMADOL HYDROCHLORIDE 50 MG: 50 TABLET, COATED ORAL at 21:19

## 2025-03-14 SDOH — SOCIAL STABILITY: SOCIAL INSECURITY: WERE YOU ABLE TO COMPLETE ALL THE BEHAVIORAL HEALTH SCREENINGS?: NO

## 2025-03-14 SDOH — SOCIAL STABILITY: SOCIAL INSECURITY: DO YOU FEEL ANYONE HAS EXPLOITED OR TAKEN ADVANTAGE OF YOU FINANCIALLY OR OF YOUR PERSONAL PROPERTY?: UNABLE TO ASSESS

## 2025-03-14 SDOH — SOCIAL STABILITY: SOCIAL INSECURITY: ARE YOU OR HAVE YOU BEEN THREATENED OR ABUSED PHYSICALLY, EMOTIONALLY, OR SEXUALLY BY ANYONE?: UNABLE TO ASSESS

## 2025-03-14 SDOH — SOCIAL STABILITY: SOCIAL INSECURITY: WITHIN THE LAST YEAR, HAVE YOU BEEN AFRAID OF YOUR PARTNER OR EX-PARTNER?: PATIENT UNABLE TO ANSWER

## 2025-03-14 SDOH — SOCIAL STABILITY: SOCIAL INSECURITY: HAVE YOU HAD ANY THOUGHTS OF HARMING ANYONE ELSE?: UNABLE TO ASSESS

## 2025-03-14 SDOH — SOCIAL STABILITY: SOCIAL INSECURITY: ABUSE: ADULT

## 2025-03-14 SDOH — SOCIAL STABILITY: SOCIAL INSECURITY: ARE THERE ANY APPARENT SIGNS OF INJURIES/BEHAVIORS THAT COULD BE RELATED TO ABUSE/NEGLECT?: NO

## 2025-03-14 SDOH — SOCIAL STABILITY: SOCIAL INSECURITY: HAS ANYONE EVER THREATENED TO HURT YOUR FAMILY OR YOUR PETS?: UNABLE TO ASSESS

## 2025-03-14 SDOH — ECONOMIC STABILITY: INCOME INSECURITY
IN THE PAST 12 MONTHS HAS THE ELECTRIC, GAS, OIL, OR WATER COMPANY THREATENED TO SHUT OFF SERVICES IN YOUR HOME?: PATIENT UNABLE TO ANSWER

## 2025-03-14 SDOH — SOCIAL STABILITY: SOCIAL INSECURITY: HAVE YOU HAD THOUGHTS OF HARMING ANYONE ELSE?: UNABLE TO ASSESS

## 2025-03-14 SDOH — SOCIAL STABILITY: SOCIAL INSECURITY: DO YOU FEEL UNSAFE GOING BACK TO THE PLACE WHERE YOU ARE LIVING?: UNABLE TO ASSESS

## 2025-03-14 SDOH — SOCIAL STABILITY: SOCIAL INSECURITY: DOES ANYONE TRY TO KEEP YOU FROM HAVING/CONTACTING OTHER FRIENDS OR DOING THINGS OUTSIDE YOUR HOME?: UNABLE TO ASSESS

## 2025-03-14 ASSESSMENT — PAIN - FUNCTIONAL ASSESSMENT
PAIN_FUNCTIONAL_ASSESSMENT: 0-10

## 2025-03-14 ASSESSMENT — COGNITIVE AND FUNCTIONAL STATUS - GENERAL
HELP NEEDED FOR BATHING: A LOT
EATING MEALS: A LITTLE
PERSONAL GROOMING: A LITTLE
TURNING FROM BACK TO SIDE WHILE IN FLAT BAD: A LOT
MOVING FROM LYING ON BACK TO SITTING ON SIDE OF FLAT BED WITH BEDRAILS: A LOT
TOILETING: A LOT
PATIENT BASELINE BEDBOUND: NO
MOBILITY SCORE: 12
MOVING TO AND FROM BED TO CHAIR: A LOT
DAILY ACTIVITIY SCORE: 14
DRESSING REGULAR LOWER BODY CLOTHING: A LOT
STANDING UP FROM CHAIR USING ARMS: A LOT
DRESSING REGULAR UPPER BODY CLOTHING: A LOT
CLIMB 3 TO 5 STEPS WITH RAILING: A LOT
WALKING IN HOSPITAL ROOM: A LOT

## 2025-03-14 ASSESSMENT — LIFESTYLE VARIABLES
TOTAL SCORE: 0
HOW OFTEN DO YOU HAVE A DRINK CONTAINING ALCOHOL: PATIENT UNABLE TO ANSWER
HOW OFTEN DO YOU HAVE 6 OR MORE DRINKS ON ONE OCCASION: PATIENT UNABLE TO ANSWER
EVER FELT BAD OR GUILTY ABOUT YOUR DRINKING: NO
HAVE YOU EVER FELT YOU SHOULD CUT DOWN ON YOUR DRINKING: NO
AUDIT-C TOTAL SCORE: -1
SKIP TO QUESTIONS 9-10: 0
EVER HAD A DRINK FIRST THING IN THE MORNING TO STEADY YOUR NERVES TO GET RID OF A HANGOVER: NO
HAVE PEOPLE ANNOYED YOU BY CRITICIZING YOUR DRINKING: NO
AUDIT-C TOTAL SCORE: -1
HOW MANY STANDARD DRINKS CONTAINING ALCOHOL DO YOU HAVE ON A TYPICAL DAY: PATIENT UNABLE TO ANSWER
PRESCIPTION_ABUSE_PAST_12_MONTHS: NO
SUBSTANCE_ABUSE_PAST_12_MONTHS: NO

## 2025-03-14 ASSESSMENT — ACTIVITIES OF DAILY LIVING (ADL)
HEARING - LEFT EAR: DIFFICULTY WITH NOISE
GROOMING: NEEDS ASSISTANCE
FEEDING YOURSELF: NEEDS ASSISTANCE
PATIENT'S MEMORY ADEQUATE TO SAFELY COMPLETE DAILY ACTIVITIES?: NO
BATHING: NEEDS ASSISTANCE
DRESSING YOURSELF: NEEDS ASSISTANCE
JUDGMENT_ADEQUATE_SAFELY_COMPLETE_DAILY_ACTIVITIES: NO
TOILETING: NEEDS ASSISTANCE
LACK_OF_TRANSPORTATION: PATIENT UNABLE TO ANSWER
HEARING - RIGHT EAR: DIFFICULTY WITH NOISE
ADEQUATE_TO_COMPLETE_ADL: YES
ASSISTIVE_DEVICE: WALKER;WHEELCHAIR
WALKS IN HOME: NEEDS ASSISTANCE

## 2025-03-14 ASSESSMENT — COLUMBIA-SUICIDE SEVERITY RATING SCALE - C-SSRS
6. HAVE YOU EVER DONE ANYTHING, STARTED TO DO ANYTHING, OR PREPARED TO DO ANYTHING TO END YOUR LIFE?: NO
1. IN THE PAST MONTH, HAVE YOU WISHED YOU WERE DEAD OR WISHED YOU COULD GO TO SLEEP AND NOT WAKE UP?: NO
2. HAVE YOU ACTUALLY HAD ANY THOUGHTS OF KILLING YOURSELF?: NO
6. HAVE YOU EVER DONE ANYTHING, STARTED TO DO ANYTHING, OR PREPARED TO DO ANYTHING TO END YOUR LIFE?: NO
1. IN THE PAST MONTH, HAVE YOU WISHED YOU WERE DEAD OR WISHED YOU COULD GO TO SLEEP AND NOT WAKE UP?: NO
2. HAVE YOU ACTUALLY HAD ANY THOUGHTS OF KILLING YOURSELF?: NO

## 2025-03-14 ASSESSMENT — PATIENT HEALTH QUESTIONNAIRE - PHQ9
1. LITTLE INTEREST OR PLEASURE IN DOING THINGS: NOT AT ALL
SUM OF ALL RESPONSES TO PHQ9 QUESTIONS 1 & 2: 0
2. FEELING DOWN, DEPRESSED OR HOPELESS: NOT AT ALL

## 2025-03-14 ASSESSMENT — PAIN DESCRIPTION - LOCATION
LOCATION: HIP
LOCATION: HIP

## 2025-03-14 ASSESSMENT — PAIN DESCRIPTION - DESCRIPTORS: DESCRIPTORS: ACHING

## 2025-03-14 ASSESSMENT — PAIN DESCRIPTION - PAIN TYPE: TYPE: ACUTE PAIN

## 2025-03-14 ASSESSMENT — PAIN DESCRIPTION - ORIENTATION: ORIENTATION: RIGHT

## 2025-03-14 ASSESSMENT — PAIN SCALES - GENERAL
PAINLEVEL_OUTOF10: 9
PAINLEVEL_OUTOF10: 8

## 2025-03-14 NOTE — ED TRIAGE NOTES
AYDE Olson from [facility] post fall. Per ems patient had an unwitnessed fall unk if hit head patient states she did but patient is poor historian per ems. Patient was xrayed at [facility] and showing to have a right hip fracture. -zander

## 2025-03-14 NOTE — H&P
History Of Present Illness  Julissa Plasencia is a 89 y.o. female  with a past medical history of hypertension, confusion, headaches, weakness, falls, L hip and pelvic fracture who presented today from [facility] assisted living with hip fracture.  It was noted last night the patient had an unwitnessed fall at the assisted living a hip x-ray was done and revealed a right hip fracture so patient was sent to the emergency room for evaluation.  Patient herself is a poor historian and does not provide any meaningful information.  Reviewed DNR CCA form at bedside.    In the ED lab work, EKG, head CT, C-spine CT, CXR, and bilateral hip x-rays were performed.  Labs revealed , WBC 13.3, neutrophils 11.72, lymphocytes .69. UA had 11-20 RBC. EKG per ER physician impression:  A-fib with a ventricular rate of 101 bpm. Imaging positive for acute intertrochanteric fracture of the right hip. /94, VS otherwise stable.  Patient was given Zofran and morphine and Ortho was contacted.  She was admitted for further evaluation and treatment under the care of Dr. Andrew Alamo.    Unable to perform review of systems due to patient's confusion       Past Medical History  Past Medical History:   Diagnosis Date    Abnormal uterine and vaginal bleeding, unspecified 12/28/2016    Abnormal vaginal bleeding    Biliary acute pancreatitis without necrosis or infection ([facility]) 02/02/2017    Acute biliary pancreatitis without infection or necrosis    Brain tumor (Multi)     Motion sickness, initial encounter 10/11/2016    Sea sickness    Other female genital prolapse 06/13/2017    Pelvic relaxation    Other specified cough 12/30/2014    Cough due to ACE inhibitor    Pain in right shoulder 06/01/2017    Acute pain of right shoulder    Personal history of other diseases of the digestive system 06/05/2017    History of acute pancreatitis    Personal history of other diseases of the digestive system 03/06/2017    History of acute  "pancreatitis    Personal history of urinary (tract) infections 03/21/2017    History of urinary tract infection       Surgical History  Past Surgical History:   Procedure Laterality Date    COLONOSCOPY  08/12/2015    Complete Colonoscopy    ESOPHAGOGASTRODUODENOSCOPY  12/08/2015    Diagnostic Esophagogastroduodenoscopy      L hip fx repair     Social History  She reports that she has quit smoking. Her smoking use included cigarettes. She has never used smokeless tobacco. She reports that she does not drink alcohol and does not use drugs.    Family History  No family history on file.     Allergies  Lisinopril    Physical Exam  Constitutional:       Comments: thin   HENT:      Head: Normocephalic.      Nose: Nose normal.      Mouth/Throat:      Mouth: Mucous membranes are moist.   Eyes:      Conjunctiva/sclera: Conjunctivae normal.   Cardiovascular:      Rate and Rhythm: Normal rate. Rhythm irregular.      Heart sounds: Normal heart sounds.   Pulmonary:      Effort: Pulmonary effort is normal.      Breath sounds: Rales present.   Abdominal:      General: Bowel sounds are normal.      Palpations: Abdomen is soft.      Tenderness: There is abdominal tenderness.   Musculoskeletal:      Cervical back: Neck supple.      Comments: Generalized weakness right lower extremity shortened and slightly internally rotated   Skin:     General: Skin is warm and dry.   Neurological:      Mental Status: She is alert. Mental status is at baseline.   Psychiatric:         Mood and Affect: Mood normal.          Last Recorded Vitals  Blood pressure (!) 204/94, pulse 95, temperature 36.3 °C (97.3 °F), temperature source Temporal, resp. rate 16, height 1.575 m (5' 2\"), weight 45.4 kg (100 lb), SpO2 96%.    Relevant Results    No current facility-administered medications on file prior to encounter.     Current Outpatient Medications on File Prior to Encounter   Medication Sig Dispense Refill    acetaminophen (Tylenol) 325 mg tablet Take 2 " tablets (650 mg) by mouth every 6 hours if needed for mild pain (1 - 3) or fever (temp greater than 38.0 C).      cholecalciferol (Vitamin D-3) 50 mcg (2,000 unit) capsule Take 1 capsule (50 mcg) by mouth once daily.      diphenhydrAMINE-acetaminophen (Tylenol PM)  mg per tablet Take 1 tablet by mouth once daily at bedtime.      melatonin 5 mg tablet Take 1 tablet (5 mg) by mouth once daily at bedtime.      metoprolol succinate XL (Toprol-XL) 50 mg 24 hr tablet TAKE 1 TABLET BY MOUTH ONCE  DAILY 90 tablet 3    polyethylene glycol (Glycolax, Miralax) 17 gram packet Take 17 g by mouth once daily.      traMADol (Ultram) 50 mg tablet Take 1 tablet (50 mg) by mouth every 8 hours if needed for moderate pain (4 - 6). 20 tablet 0      Results for orders placed or performed during the hospital encounter of 03/14/25 (from the past 24 hours)   CBC and Auto Differential   Result Value Ref Range    WBC 13.3 (H) 4.4 - 11.3 x10*3/uL    nRBC 0.0 0.0 - 0.0 /100 WBCs    RBC 3.74 (L) 4.00 - 5.20 x10*6/uL    Hemoglobin 11.4 (L) 12.0 - 16.0 g/dL    Hematocrit 35.1 (L) 36.0 - 46.0 %    MCV 94 80 - 100 fL    MCH 30.5 26.0 - 34.0 pg    MCHC 32.5 32.0 - 36.0 g/dL    RDW 13.7 11.5 - 14.5 %    Platelets 330 150 - 450 x10*3/uL    Neutrophils % 88.3 40.0 - 80.0 %    Immature Granulocytes %, Automated 0.6 0.0 - 0.9 %    Lymphocytes % 5.2 13.0 - 44.0 %    Monocytes % 5.7 2.0 - 10.0 %    Eosinophils % 0.0 0.0 - 6.0 %    Basophils % 0.2 0.0 - 2.0 %    Neutrophils Absolute 11.72 (H) 1.60 - 5.50 x10*3/uL    Immature Granulocytes Absolute, Automated 0.08 0.00 - 0.50 x10*3/uL    Lymphocytes Absolute 0.69 (L) 0.80 - 3.00 x10*3/uL    Monocytes Absolute 0.75 0.05 - 0.80 x10*3/uL    Eosinophils Absolute 0.00 0.00 - 0.40 x10*3/uL    Basophils Absolute 0.03 0.00 - 0.10 x10*3/uL   Comprehensive metabolic panel   Result Value Ref Range    Glucose 148 (H) 74 - 99 mg/dL    Sodium 135 (L) 136 - 145 mmol/L    Potassium 3.9 3.5 - 5.3 mmol/L    Chloride 97 (L)  98 - 107 mmol/L    Bicarbonate 28 21 - 32 mmol/L    Anion Gap 14 10 - 20 mmol/L    Urea Nitrogen 11 6 - 23 mg/dL    Creatinine 0.52 0.50 - 1.05 mg/dL    eGFR 85 >60 mL/min/1.73m*2    Calcium 9.2 8.6 - 10.3 mg/dL    Albumin 3.8 3.4 - 5.0 g/dL    Alkaline Phosphatase 128 33 - 136 U/L    Total Protein 6.9 6.4 - 8.2 g/dL    AST 17 9 - 39 U/L    Bilirubin, Total 0.4 0.0 - 1.2 mg/dL    ALT 12 7 - 45 U/L   Troponin I, High Sensitivity   Result Value Ref Range    Troponin I, High Sensitivity 15 (H) 0 - 13 ng/L   Urinalysis with Reflex Culture and Microscopic   Result Value Ref Range    Color, Urine Light-Yellow Light-Yellow, Yellow, Dark-Yellow    Appearance, Urine Clear Clear    Specific Gravity, Urine 1.014 1.005 - 1.035    pH, Urine 7.0 5.0, 5.5, 6.0, 6.5, 7.0, 7.5, 8.0    Protein, Urine 20 (TRACE) NEGATIVE, 10 (TRACE), 20 (TRACE) mg/dL    Glucose, Urine Normal Normal mg/dL    Blood, Urine NEGATIVE NEGATIVE mg/dL    Ketones, Urine NEGATIVE NEGATIVE mg/dL    Bilirubin, Urine NEGATIVE NEGATIVE mg/dL    Urobilinogen, Urine Normal Normal mg/dL    Nitrite, Urine NEGATIVE NEGATIVE    Leukocyte Esterase, Urine NEGATIVE NEGATIVE   Urinalysis Microscopic   Result Value Ref Range    WBC, Urine NONE 1-5, NONE /HPF    RBC, Urine 11-20 (A) NONE, 1-2, 3-5 /HPF        CT head wo IV contrast    Result Date: 3/14/2025  Interpreted By:  Thais Bennett, STUDY: CT HEAD WO IV CONTRAST; CT CERVICAL SPINE WO IV CONTRAST;  3/14/2025 2:02 pm   INDICATION: Signs/Symptoms:head injury; Signs/Symptoms:fall.   COMPARISON: CT head 12/11/2024, CT C-spine 12/11/2024   ACCESSION NUMBER(S): [id number]; [id number]   ORDERING CLINICIAN: LEIDY GAMBLE   TECHNIQUE: CT scan of the head and cervical spine was performed without intravenous contrast.   FINDINGS: CT HEAD:   Parenchyma: No intracranial hemorrhage. The grey-white differentiation is intact. No mass effect or midline shift. Similar scattered periventricular white matter hypodensities, likely  chronic microvascular ischemic change. Similar moderate to severe parenchymal atrophy. Unchanged 1.3 cm left superior frontal calcified meningioma.   CSF Spaces: The ventricles, sulci and basal cisterns are proportional to the degree of parenchymal volume loss.   Extra-Axial Fluid: None.   Calvarium: No acute depressed fracture.   Paranasal sinuses: Clear.   Mastoids: Clear.   Orbits: Bilateral lens replacements.   Soft tissues: No scalp hematoma.     CT CERVICAL SPINE:   Prevertebral/Paraspinal Soft Tissues: No acute traumatic injury.   Hardware: None. Fracture: None. Vertebral Body Heights: Normal. Alignment: No traumatic listhesis. Spinal canal and neural foramina: No high-grade canal stenosis or neural foraminal narrowing.       No acute intracranial hemorrhage or depressed calvarial fracture.   No acute fracture or traumatic malalignment of the cervical spine.   MACRO None     Signed by: Thais Bennett 3/14/2025 2:25 PM Dictation workstation:   [id number]    CT cervical spine wo IV contrast    Result Date: 3/14/2025  Interpreted By:  Thais Bennett, STUDY: CT HEAD WO IV CONTRAST; CT CERVICAL SPINE WO IV CONTRAST;  3/14/2025 2:02 pm   INDICATION: Signs/Symptoms:head injury; Signs/Symptoms:fall.   COMPARISON: CT head 12/11/2024, CT C-spine 12/11/2024   ACCESSION NUMBER(S): [id number]; [id number]   ORDERING CLINICIAN: LEIDY GAMBLE   TECHNIQUE: CT scan of the head and cervical spine was performed without intravenous contrast.   FINDINGS: CT HEAD:   Parenchyma: No intracranial hemorrhage. The grey-white differentiation is intact. No mass effect or midline shift. Similar scattered periventricular white matter hypodensities, likely chronic microvascular ischemic change. Similar moderate to severe parenchymal atrophy. Unchanged 1.3 cm left superior frontal calcified meningioma.   CSF Spaces: The ventricles, sulci and basal cisterns are proportional to the degree of parenchymal volume loss.   Extra-Axial Fluid:  None.   Calvarium: No acute depressed fracture.   Paranasal sinuses: Clear.   Mastoids: Clear.   Orbits: Bilateral lens replacements.   Soft tissues: No scalp hematoma.     CT CERVICAL SPINE:   Prevertebral/Paraspinal Soft Tissues: No acute traumatic injury.   Hardware: None. Fracture: None. Vertebral Body Heights: Normal. Alignment: No traumatic listhesis. Spinal canal and neural foramina: No high-grade canal stenosis or neural foraminal narrowing.       No acute intracranial hemorrhage or depressed calvarial fracture.   No acute fracture or traumatic malalignment of the cervical spine.   MACRO None     Signed by: Thais Bennett 3/14/2025 2:25 PM Dictation workstation:   [id number]    XR chest 1 view    Result Date: 3/14/2025  STUDY: Chest Radiograph;  3/14/25 at 11:23 AM INDICATION: Medical clearance. COMPARISON: CT chest and chest XR 12/11/24. ACCESSION NUMBER(S): [id number] ORDERING CLINICIAN: LEIDY GAMBLE TECHNIQUE:  Frontal chest was obtained at 1122 hours. FINDINGS: CARDIOMEDIASTINAL SILHOUETTE: Cardiomediastinal silhouette is normal in size and configuration.  LUNGS: There are emphysematous changes. Calcified granuloma in the right base.  ABDOMEN: No remarkable upper abdominal findings.  BONES: No acute osseous changes.    Emphysema. No acute process. Signed by Ruiz Chaparro MD    XR hip right with pelvis when performed 2 or 3 views    Result Date: 3/14/2025  STUDY: Pelvis and Right Hip Radiographs; 3/14/2025 11:22 AM. INDICATION: Hip fracture. COMPARISON: Pelvis and left hip radiographs 12/11/2024. ACCESSION NUMBER(S): [id number] ORDERING CLINICIAN: LEIDY GAMBLE TECHNIQUE:  AP view of the pelvis and two views of the right hip (four images obtained). FINDINGS:  PELVIS: The pelvic ring is intact.  There is no acute fracture. RIGHT HIP: There is an acute intertrochanteric fracture of the right hip. Postsurgical changes of the left hip.  No soft tissue abnormality is seen.    Acute  intertrochanteric fracture of the right hip. Signed by Ruiz Chaparro MD         Assessment/Plan   Assessment & Plan  Fall, initial encounter    Closed intertrochanteric fracture of right femur, initial encounter    New onset a-fib (Multi)    Leukocytosis    Prolonged Q-T interval on ECG    Protein calorie malnutrition (Multi)      Admit to tele  Appreciate ortho recs  NPO after mn  Morphine for breakthrough pain  Bedrest  Check covid/flu  Am CBC, BMP  Add mag  Leukocytosis may be a result of trauma, will hold antibiotics at this time.  Dietician consult    New Afib    Appreciate cardio recs  Check trops  Echo  Continue home lopressor  [id number], however with surgery likely and frequent fall will defer to cardiology for possible AC     Prolonged QT  Monitor on tele     Chronic conditions: Hypertension, headaches  Continue home medications as listed above     DVT prophylaxis  SCDs  Heparin, hold am dose        Missy Shaikh, APRN-CNP

## 2025-03-14 NOTE — CONSULTS
Reason For Consult  Right hip fracture    History Of Present Illness  Julissa Plasencia is a 89 y.o. female presenting with right hip pain after a fall in a skilled nursing facility.  She is in a skilled nursing's facility full-time.  She does have a history of dementia.  She was found down and was unable to ambulate.  X-rays taken at the facility showed a right hip fracture and she was then brought to [facility].  X-rays at the hospital again revealed a right femur intertrochanteric hip fracture.  There was no report of any other portions of her body that she was noticing pain in.  She was admitted to medicine orthopedics was consulted because of the hip fracture..     Past Medical History  She has a past medical history of Abnormal uterine and vaginal bleeding, unspecified (12/28/2016), Biliary acute pancreatitis without necrosis or infection ([facility]) (02/02/2017), Brain tumor (Multi), Motion sickness, initial encounter (10/11/2016), Other female genital prolapse (06/13/2017), Other specified cough (12/30/2014), Pain in right shoulder (06/01/2017), Personal history of other diseases of the digestive system (06/05/2017), Personal history of other diseases of the digestive system (03/06/2017), and Personal history of urinary (tract) infections (03/21/2017).    Surgical History  She has a past surgical history that includes Colonoscopy (08/12/2015) and Esophagogastroduodenoscopy (12/08/2015).     Social History  She reports that she has quit smoking. Her smoking use included cigarettes. She has never used smokeless tobacco. She reports that she does not drink alcohol and does not use drugs.    Family History  No family history on file.     Allergies  Lisinopril    Review of Systems  Noncontributory     Physical Exam  Patient is alert and orient x 1 in her hospital [facility].  Her right lower extremity is shortened and externally rotated.  She did grimace with pain with rotation of her hip.  Capillary refill is less than  "2 seconds distally.  The skin envelope in her right lower extremity was intact.     Last Recorded Vitals  Blood pressure (!) 204/94, pulse 95, temperature 36.3 °C (97.3 °F), temperature source Temporal, resp. rate 16, height 1.575 m (5' 2\"), weight 45.4 kg (100 lb), SpO2 96%.    Relevant Results  X-rays of the patient's right hip and femur were reviewed.  She has a minimally displaced right femoral intertrochanteric hip fracture.  Scheduled medications    Continuous medications    PRN medications    Results for orders placed or performed during the hospital encounter of 03/14/25 (from the past 24 hours)   CBC and Auto Differential   Result Value Ref Range    WBC 13.3 (H) 4.4 - 11.3 x10*3/uL    nRBC 0.0 0.0 - 0.0 /100 WBCs    RBC 3.74 (L) 4.00 - 5.20 x10*6/uL    Hemoglobin 11.4 (L) 12.0 - 16.0 g/dL    Hematocrit 35.1 (L) 36.0 - 46.0 %    MCV 94 80 - 100 fL    MCH 30.5 26.0 - 34.0 pg    MCHC 32.5 32.0 - 36.0 g/dL    RDW 13.7 11.5 - 14.5 %    Platelets 330 150 - 450 x10*3/uL    Neutrophils % 88.3 40.0 - 80.0 %    Immature Granulocytes %, Automated 0.6 0.0 - 0.9 %    Lymphocytes % 5.2 13.0 - 44.0 %    Monocytes % 5.7 2.0 - 10.0 %    Eosinophils % 0.0 0.0 - 6.0 %    Basophils % 0.2 0.0 - 2.0 %    Neutrophils Absolute 11.72 (H) 1.60 - 5.50 x10*3/uL    Immature Granulocytes Absolute, Automated 0.08 0.00 - 0.50 x10*3/uL    Lymphocytes Absolute 0.69 (L) 0.80 - 3.00 x10*3/uL    Monocytes Absolute 0.75 0.05 - 0.80 x10*3/uL    Eosinophils Absolute 0.00 0.00 - 0.40 x10*3/uL    Basophils Absolute 0.03 0.00 - 0.10 x10*3/uL   Comprehensive metabolic panel   Result Value Ref Range    Glucose 148 (H) 74 - 99 mg/dL    Sodium 135 (L) 136 - 145 mmol/L    Potassium 3.9 3.5 - 5.3 mmol/L    Chloride 97 (L) 98 - 107 mmol/L    Bicarbonate 28 21 - 32 mmol/L    Anion Gap 14 10 - 20 mmol/L    Urea Nitrogen 11 6 - 23 mg/dL    Creatinine 0.52 0.50 - 1.05 mg/dL    eGFR 85 >60 mL/min/1.73m*2    Calcium 9.2 8.6 - 10.3 mg/dL    Albumin 3.8 3.4 - 5.0 " g/dL    Alkaline Phosphatase 128 33 - 136 U/L    Total Protein 6.9 6.4 - 8.2 g/dL    AST 17 9 - 39 U/L    Bilirubin, Total 0.4 0.0 - 1.2 mg/dL    ALT 12 7 - 45 U/L   Troponin I, High Sensitivity   Result Value Ref Range    Troponin I, High Sensitivity 15 (H) 0 - 13 ng/L   Urinalysis with Reflex Culture and Microscopic   Result Value Ref Range    Color, Urine Light-Yellow Light-Yellow, Yellow, Dark-Yellow    Appearance, Urine Clear Clear    Specific Gravity, Urine 1.014 1.005 - 1.035    pH, Urine 7.0 5.0, 5.5, 6.0, 6.5, 7.0, 7.5, 8.0    Protein, Urine 20 (TRACE) NEGATIVE, 10 (TRACE), 20 (TRACE) mg/dL    Glucose, Urine Normal Normal mg/dL    Blood, Urine NEGATIVE NEGATIVE mg/dL    Ketones, Urine NEGATIVE NEGATIVE mg/dL    Bilirubin, Urine NEGATIVE NEGATIVE mg/dL    Urobilinogen, Urine Normal Normal mg/dL    Nitrite, Urine NEGATIVE NEGATIVE    Leukocyte Esterase, Urine NEGATIVE NEGATIVE   Urinalysis Microscopic   Result Value Ref Range    WBC, Urine NONE 1-5, NONE /HPF    RBC, Urine 11-20 (A) NONE, 1-2, 3-5 /HPF        Assessment/Plan     The patient had an unwitnessed fall and sustained a right femur intertrochanteric hip fracture.  She was admitted to the medical service.  I spoke with the patient's son, Momo, who is also the power of  over the phone as he lives in [place] and explained the situation.  I explained to him that his mom would require surgical stabilization of the fracture and it would likely be done the following day.  The patient understands he will be the one who needs to provide informed consent at the time of surgery.  At this time the patient undergo medical optimization and plan is for surgery on the following day.    Momo Allan MD

## 2025-03-14 NOTE — ED PROVIDER NOTES
HPI   Chief Complaint   Patient presents with    Fall     AYDE Olson from [facility] post fall patient states she slipped on water. Per ems patient had an unwitnessed fall unk if hit head patient states she did but patient is poor historian per ems. Patient was xrayed at [facility] and showing to have a right hip fracture. -thinners    Hip Pain       89-year-old female presenting from nursing facility.  She states she slipped in water last night had a unwitnessed fall.  She is not on any blood thinners.  She is unable to tell me what month or year it is.  She is unsure if she hit her head.  Patient is a poor informant.  She does complain of pain to the right hip.  Records indicate that she has a intertrochanteric right hip fracture.               Patient History   Past Medical History:   Diagnosis Date    Abnormal uterine and vaginal bleeding, unspecified 12/28/2016    Abnormal vaginal bleeding    Biliary acute pancreatitis without necrosis or infection ([facility]) 02/02/2017    Acute biliary pancreatitis without infection or necrosis    Brain tumor (Multi)     Motion sickness, initial encounter 10/11/2016    Sea sickness    Other female genital prolapse 06/13/2017    Pelvic relaxation    Other specified cough 12/30/2014    Cough due to ACE inhibitor    Pain in right shoulder 06/01/2017    Acute pain of right shoulder    Personal history of other diseases of the digestive system 06/05/2017    History of acute pancreatitis    Personal history of other diseases of the digestive system 03/06/2017    History of acute pancreatitis    Personal history of urinary (tract) infections 03/21/2017    History of urinary tract infection     Past Surgical History:   Procedure Laterality Date    COLONOSCOPY  08/12/2015    Complete Colonoscopy    ESOPHAGOGASTRODUODENOSCOPY  12/08/2015    Diagnostic Esophagogastroduodenoscopy     No family history on file.  Social History     Tobacco Use    Smoking status: Former      Types: Cigarettes    Smokeless tobacco: Never   Vaping Use    Vaping status: Never Used   Substance Use Topics    Alcohol use: Never    Drug use: Never       Physical Exam   ED Triage Vitals   Temperature Heart Rate Respirations BP   03/14/25 1018 03/14/25 1018 03/14/25 1018 03/14/25 1024   36.3 °C (97.3 °F) 95 16 (!) 204/94      Pulse Ox Temp Source Heart Rate Source Patient Position   03/14/25 1018 03/14/25 1018 03/14/25 1018 03/14/25 1018   96 % Temporal Monitor Lying      BP Location FiO2 (%)     03/14/25 1018 --     Right arm        Physical Exam  Vitals and nursing note reviewed.   Constitutional:       General: She is not in acute distress.     Appearance: She is well-developed.   HENT:      Head: Normocephalic and atraumatic.   Eyes:      Conjunctiva/sclera: Conjunctivae normal.   Cardiovascular:      Rate and Rhythm: Normal rate and regular rhythm.      Heart sounds: No murmur heard.  Pulmonary:      Effort: Pulmonary effort is normal. No respiratory distress.      Breath sounds: Normal breath sounds.   Abdominal:      Palpations: Abdomen is soft.      Tenderness: There is no abdominal tenderness.   Musculoskeletal:      Cervical back: Neck supple.      Right hip: Deformity, tenderness and bony tenderness present. Decreased range of motion.   Skin:     General: Skin is warm and dry.      Capillary Refill: Capillary refill takes less than 2 seconds.   Neurological:      Mental Status: She is alert.   Psychiatric:         Mood and Affect: Mood normal.           ED Course & MDM   Diagnoses as of 03/14/25 1511   Fall, initial encounter   Closed intertrochanteric fracture of hip, right, initial encounter                 No data recorded     Carney Coma Scale Score: 14 (03/14/25 1022 : Concha Mccormack, BRITTANY)                           Medical Decision Making  Patient presenting with right hip fracture which was confirmed by x-ray as an outpatient.  I cannot see this so I we will repeat this.  Patient is a poor  informant.  She is unsure if she hit her head.  For this reason we did do lab work and CT of the brain and cervical spine will be added to her workup.  Hodges catheter was placed as she is going to be unable to use the restroom.  Urinalysis will be obtained.  Patient was medicated with morphine and Zofran.  CBC shows a slight leukocytosis of 13.3.  Hemoglobin 11.4.  Platelets are normal.  Renal function and electrolytes unremarkable.  High-sensitivity troponin is 15.  Urinalysis negative for infection.  EKG interpreted by myself shows A-fib with a ventricular rate of 101 bpm.  CT brain and cervical spine negative for acute findings.   discussed with Cricket Alamo for admission.  Also discussed with Dr. Allan.  Patient admitted in stable condition.          Procedure  Procedures     Arash Patel,   03/14/25 4773

## 2025-03-14 NOTE — PROGRESS NOTES
Pharmacy Medication History Review    Julissa Plasencia is a 89 y.o. female admitted for No Principal Problem: There is no principal problem currently on the Problem List. Please update the Problem List and refresh.. Pharmacy reviewed the patient's poorc-he-xtnxwyonw medications and allergies for accuracy.    The list below reflectives the updated PTA list. Please review each medication in order reconciliation for additional clarification and justification.  Prior to Admission medications    Medication Sig Start Date End Date Authorizing Provider   acetaminophen (Tylenol) 325 mg tablet Take 2 tablets (650 mg) by mouth every 6 hours if needed for mild pain (1 - 3) or fever (temp greater than 38.0 C).   Historical Provider, MD   cholecalciferol (Vitamin D-3) 50 mcg (2,000 unit) capsule Take 1 capsule (50 mcg) by mouth once daily.   Historical Provider, MD   diphenhydrAMINE-acetaminophen (Tylenol PM)  mg per tablet Take 1 tablet by mouth once daily at bedtime.   Historical Provider, MD   melatonin 5 mg tablet Take 1 tablet (5 mg) by mouth once daily at bedtime.   Historical Provider, MD   metoprolol succinate XL (Toprol-XL) 50 mg 24 hr tablet TAKE 1 TABLET BY MOUTH ONCE  DAILY   Hakan Bond, DO   polyethylene glycol (Glycolax, Miralax) 17 gram packet Take 17 g by mouth once daily.   Andrew Alamo, DO   traMADol (Ultram) 50 mg tablet Take 1 tablet (50 mg) by mouth every 8 hours if needed for moderate pain (4 - 6).   Andrew Alamo, DO        The list below reflectives the updated allergy list. Please review each documented allergy for additional clarification and justification.  Allergies  Reviewed by Concha Zavala RN on 3/14/2025        Severity Reactions Comments    Lisinopril Low Cough             Below are additional concerns with the patient's PTA list.      Marisela Allison

## 2025-03-15 ENCOUNTER — ANESTHESIA (OUTPATIENT)
Dept: OPERATING ROOM | Facility: HOSPITAL | Age: OVER 89
End: 2025-03-15
Payer: MEDICARE

## 2025-03-15 ENCOUNTER — ANESTHESIA EVENT (OUTPATIENT)
Dept: OPERATING ROOM | Facility: HOSPITAL | Age: OVER 89
End: 2025-03-15
Payer: MEDICARE

## 2025-03-15 ENCOUNTER — APPOINTMENT (OUTPATIENT)
Dept: RADIOLOGY | Facility: HOSPITAL | Age: OVER 89
DRG: 481 | End: 2025-03-15
Payer: MEDICARE

## 2025-03-15 PROBLEM — D64.9 ANEMIA: Status: ACTIVE | Noted: 2025-03-15

## 2025-03-15 PROBLEM — R79.89 ELEVATED LIVER FUNCTION TESTS: Status: ACTIVE | Noted: 2025-03-15

## 2025-03-15 PROBLEM — M16.0 OSTEOARTHRITIS OF BOTH HIPS: Status: ACTIVE | Noted: 2025-03-15

## 2025-03-15 PROBLEM — N93.8 DYSFUNCTIONAL UTERINE BLEEDING: Status: ACTIVE | Noted: 2025-03-15

## 2025-03-15 PROBLEM — R51.9 CHRONIC HEADACHES: Status: ACTIVE | Noted: 2025-03-15

## 2025-03-15 PROBLEM — K76.9 LIVER DISEASE: Status: ACTIVE | Noted: 2025-03-15

## 2025-03-15 PROBLEM — F41.9 ANXIETY: Status: ACTIVE | Noted: 2025-03-15

## 2025-03-15 PROBLEM — G89.29 CHRONIC HEADACHES: Status: ACTIVE | Noted: 2025-03-15

## 2025-03-15 PROBLEM — F03.90 DEMENTIA: Status: ACTIVE | Noted: 2025-03-15

## 2025-03-15 PROBLEM — N39.0 URINARY TRACT INFECTION: Status: ACTIVE | Noted: 2025-03-15

## 2025-03-15 LAB
ANION GAP SERPL CALC-SCNC: 14 MMOL/L (ref 10–20)
BUN SERPL-MCNC: 16 MG/DL (ref 6–23)
CALCIUM SERPL-MCNC: 8.9 MG/DL (ref 8.6–10.3)
CHLORIDE SERPL-SCNC: 97 MMOL/L (ref 98–107)
CO2 SERPL-SCNC: 28 MMOL/L (ref 21–32)
CREAT SERPL-MCNC: 0.49 MG/DL (ref 0.5–1.05)
EGFRCR SERPLBLD CKD-EPI 2021: 86 ML/MIN/1.73M*2
ERYTHROCYTE [DISTWIDTH] IN BLOOD BY AUTOMATED COUNT: 13.7 % (ref 11.5–14.5)
GLUCOSE SERPL-MCNC: 114 MG/DL (ref 74–99)
HCT VFR BLD AUTO: 32.6 % (ref 36–46)
HGB BLD-MCNC: 10.8 G/DL (ref 12–16)
MCH RBC QN AUTO: 30.6 PG (ref 26–34)
MCHC RBC AUTO-ENTMCNC: 33.1 G/DL (ref 32–36)
MCV RBC AUTO: 92 FL (ref 80–100)
NRBC BLD-RTO: 0 /100 WBCS (ref 0–0)
PLATELET # BLD AUTO: 299 X10*3/UL (ref 150–450)
POTASSIUM SERPL-SCNC: 4.5 MMOL/L (ref 3.5–5.3)
RBC # BLD AUTO: 3.53 X10*6/UL (ref 4–5.2)
SODIUM SERPL-SCNC: 134 MMOL/L (ref 136–145)
WBC # BLD AUTO: 13.1 X10*3/UL (ref 4.4–11.3)

## 2025-03-15 PROCEDURE — 3600000004 HC OR TIME - INITIAL BASE CHARGE - PROCEDURE LEVEL FOUR: Performed by: ORTHOPAEDIC SURGERY

## 2025-03-15 PROCEDURE — 1200000002 HC GENERAL ROOM WITH TELEMETRY DAILY

## 2025-03-15 PROCEDURE — 2500000005 HC RX 250 GENERAL PHARMACY W/O HCPCS: Performed by: ORTHOPAEDIC SURGERY

## 2025-03-15 PROCEDURE — 3700000001 HC GENERAL ANESTHESIA TIME - INITIAL BASE CHARGE: Performed by: ORTHOPAEDIC SURGERY

## 2025-03-15 PROCEDURE — 73501 X-RAY EXAM HIP UNI 1 VIEW: CPT | Mod: RT

## 2025-03-15 PROCEDURE — 2720000007 HC OR 272 NO HCPCS: Performed by: ORTHOPAEDIC SURGERY

## 2025-03-15 PROCEDURE — 0QS636Z REPOSITION RIGHT UPPER FEMUR WITH INTRAMEDULLARY INTERNAL FIXATION DEVICE, PERCUTANEOUS APPROACH: ICD-10-PCS | Performed by: ORTHOPAEDIC SURGERY

## 2025-03-15 PROCEDURE — 2500000004 HC RX 250 GENERAL PHARMACY W/ HCPCS (ALT 636 FOR OP/ED): Performed by: NURSE ANESTHETIST, CERTIFIED REGISTERED

## 2025-03-15 PROCEDURE — 27245 TREAT THIGH FRACTURE: CPT | Performed by: ORTHOPAEDIC SURGERY

## 2025-03-15 PROCEDURE — 51702 INSERT TEMP BLADDER CATH: CPT

## 2025-03-15 PROCEDURE — 7100000002 HC RECOVERY ROOM TIME - EACH INCREMENTAL 1 MINUTE: Performed by: ORTHOPAEDIC SURGERY

## 2025-03-15 PROCEDURE — 2500000001 HC RX 250 WO HCPCS SELF ADMINISTERED DRUGS (ALT 637 FOR MEDICARE OP): Performed by: ORTHOPAEDIC SURGERY

## 2025-03-15 PROCEDURE — C1769 GUIDE WIRE: HCPCS | Performed by: ORTHOPAEDIC SURGERY

## 2025-03-15 PROCEDURE — 3700000002 HC GENERAL ANESTHESIA TIME - EACH INCREMENTAL 1 MINUTE: Performed by: ORTHOPAEDIC SURGERY

## 2025-03-15 PROCEDURE — C1713 ANCHOR/SCREW BN/BN,TIS/BN: HCPCS | Performed by: ORTHOPAEDIC SURGERY

## 2025-03-15 PROCEDURE — 85027 COMPLETE CBC AUTOMATED: CPT | Performed by: NURSE PRACTITIONER

## 2025-03-15 PROCEDURE — 2500000004 HC RX 250 GENERAL PHARMACY W/ HCPCS (ALT 636 FOR OP/ED): Performed by: ANESTHESIOLOGY

## 2025-03-15 PROCEDURE — 2780000003 HC OR 278 NO HCPCS: Performed by: ORTHOPAEDIC SURGERY

## 2025-03-15 PROCEDURE — 3600000009 HC OR TIME - EACH INCREMENTAL 1 MINUTE - PROCEDURE LEVEL FOUR: Performed by: ORTHOPAEDIC SURGERY

## 2025-03-15 PROCEDURE — 7100000001 HC RECOVERY ROOM TIME - INITIAL BASE CHARGE: Performed by: ORTHOPAEDIC SURGERY

## 2025-03-15 PROCEDURE — 76000 FLUOROSCOPY <1 HR PHYS/QHP: CPT

## 2025-03-15 PROCEDURE — 80048 BASIC METABOLIC PNL TOTAL CA: CPT | Performed by: NURSE PRACTITIONER

## 2025-03-15 PROCEDURE — 2500000004 HC RX 250 GENERAL PHARMACY W/ HCPCS (ALT 636 FOR OP/ED): Performed by: ORTHOPAEDIC SURGERY

## 2025-03-15 PROCEDURE — A6213 FOAM DRG >16<=48 SQ IN W/BDR: HCPCS | Performed by: ORTHOPAEDIC SURGERY

## 2025-03-15 PROCEDURE — 73501 X-RAY EXAM HIP UNI 1 VIEW: CPT | Mod: RIGHT SIDE | Performed by: RADIOLOGY

## 2025-03-15 PROCEDURE — 36415 COLL VENOUS BLD VENIPUNCTURE: CPT | Performed by: NURSE PRACTITIONER

## 2025-03-15 DEVICE — SCREW, LOCKING 5.0MM X 34MM TI STERILE: Type: IMPLANTABLE DEVICE | Site: HIP | Status: FUNCTIONAL

## 2025-03-15 DEVICE — HELICAL BLADES, TFNA FENESTRATED, 85MM, STERILE: Type: IMPLANTABLE DEVICE | Site: HIP | Status: FUNCTIONAL

## 2025-03-15 DEVICE — NAIL, TFN ADV SHORT, 170MML, DIAMETER 11, 125 DEG: Type: IMPLANTABLE DEVICE | Site: HIP | Status: FUNCTIONAL

## 2025-03-15 RX ORDER — SENNOSIDES 8.8 MG/5ML
10 LIQUID ORAL 2 TIMES DAILY
Status: DISCONTINUED | OUTPATIENT
Start: 2025-03-15 | End: 2025-03-20 | Stop reason: HOSPADM

## 2025-03-15 RX ORDER — FENTANYL CITRATE 50 UG/ML
12.5 INJECTION, SOLUTION INTRAMUSCULAR; INTRAVENOUS EVERY 5 MIN PRN
Status: DISCONTINUED | OUTPATIENT
Start: 2025-03-15 | End: 2025-03-15 | Stop reason: HOSPADM

## 2025-03-15 RX ORDER — ENOXAPARIN SODIUM 100 MG/ML
30 INJECTION SUBCUTANEOUS EVERY 12 HOURS SCHEDULED
Status: DISCONTINUED | OUTPATIENT
Start: 2025-03-15 | End: 2025-03-20 | Stop reason: HOSPADM

## 2025-03-15 RX ORDER — ALBUTEROL SULFATE 0.83 MG/ML
2.5 SOLUTION RESPIRATORY (INHALATION) ONCE AS NEEDED
Status: DISCONTINUED | OUTPATIENT
Start: 2025-03-15 | End: 2025-03-15 | Stop reason: HOSPADM

## 2025-03-15 RX ORDER — DROPERIDOL 2.5 MG/ML
0.62 INJECTION, SOLUTION INTRAMUSCULAR; INTRAVENOUS ONCE AS NEEDED
Status: DISCONTINUED | OUTPATIENT
Start: 2025-03-15 | End: 2025-03-15 | Stop reason: RX

## 2025-03-15 RX ORDER — OXYCODONE AND ACETAMINOPHEN 5; 325 MG/1; MG/1
1 TABLET ORAL EVERY 4 HOURS PRN
Status: DISCONTINUED | OUTPATIENT
Start: 2025-03-15 | End: 2025-03-15 | Stop reason: HOSPADM

## 2025-03-15 RX ORDER — HYDRALAZINE HYDROCHLORIDE 20 MG/ML
5 INJECTION INTRAMUSCULAR; INTRAVENOUS EVERY 10 MIN PRN
Status: DISCONTINUED | OUTPATIENT
Start: 2025-03-15 | End: 2025-03-15 | Stop reason: HOSPADM

## 2025-03-15 RX ORDER — FENTANYL CITRATE 50 UG/ML
INJECTION, SOLUTION INTRAMUSCULAR; INTRAVENOUS AS NEEDED
Status: DISCONTINUED | OUTPATIENT
Start: 2025-03-15 | End: 2025-03-15

## 2025-03-15 RX ORDER — MORPHINE SULFATE 2 MG/ML
2 INJECTION, SOLUTION INTRAMUSCULAR; INTRAVENOUS EVERY 2 HOUR PRN
Status: DISCONTINUED | OUTPATIENT
Start: 2025-03-15 | End: 2025-03-18

## 2025-03-15 RX ORDER — ONDANSETRON HYDROCHLORIDE 2 MG/ML
INJECTION, SOLUTION INTRAVENOUS AS NEEDED
Status: DISCONTINUED | OUTPATIENT
Start: 2025-03-15 | End: 2025-03-15

## 2025-03-15 RX ORDER — SODIUM CHLORIDE, SODIUM LACTATE, POTASSIUM CHLORIDE, CALCIUM CHLORIDE 600; 310; 30; 20 MG/100ML; MG/100ML; MG/100ML; MG/100ML
5 INJECTION, SOLUTION INTRAVENOUS CONTINUOUS
Status: DISCONTINUED | OUTPATIENT
Start: 2025-03-15 | End: 2025-03-15 | Stop reason: HOSPADM

## 2025-03-15 RX ORDER — CEFAZOLIN SODIUM 2 G/100ML
2 INJECTION, SOLUTION INTRAVENOUS ONCE
Status: DISCONTINUED | OUTPATIENT
Start: 2025-03-15 | End: 2025-03-15 | Stop reason: HOSPADM

## 2025-03-15 RX ORDER — OXYCODONE AND ACETAMINOPHEN 5; 325 MG/1; MG/1
1 TABLET ORAL EVERY 4 HOURS PRN
Status: DISCONTINUED | OUTPATIENT
Start: 2025-03-15 | End: 2025-03-18

## 2025-03-15 RX ORDER — ACETAMINOPHEN 10 MG/ML
15 INJECTION, SOLUTION INTRAVENOUS ONCE
Status: COMPLETED | OUTPATIENT
Start: 2025-03-15 | End: 2025-03-15

## 2025-03-15 RX ORDER — ONDANSETRON HYDROCHLORIDE 2 MG/ML
4 INJECTION, SOLUTION INTRAVENOUS EVERY 8 HOURS PRN
Status: DISCONTINUED | OUTPATIENT
Start: 2025-03-15 | End: 2025-03-20 | Stop reason: HOSPADM

## 2025-03-15 RX ORDER — LABETALOL HYDROCHLORIDE 5 MG/ML
INJECTION, SOLUTION INTRAVENOUS AS NEEDED
Status: DISCONTINUED | OUTPATIENT
Start: 2025-03-15 | End: 2025-03-15

## 2025-03-15 RX ORDER — PROPOFOL 10 MG/ML
INJECTION, EMULSION INTRAVENOUS AS NEEDED
Status: DISCONTINUED | OUTPATIENT
Start: 2025-03-15 | End: 2025-03-15

## 2025-03-15 RX ORDER — NALOXONE HYDROCHLORIDE 1 MG/ML
0.2 INJECTION INTRAMUSCULAR; INTRAVENOUS; SUBCUTANEOUS EVERY 5 MIN PRN
Status: DISCONTINUED | OUTPATIENT
Start: 2025-03-15 | End: 2025-03-20 | Stop reason: HOSPADM

## 2025-03-15 RX ORDER — ACETAMINOPHEN 325 MG/1
650 TABLET ORAL EVERY 4 HOURS PRN
Status: DISCONTINUED | OUTPATIENT
Start: 2025-03-15 | End: 2025-03-15 | Stop reason: HOSPADM

## 2025-03-15 RX ORDER — ACETAMINOPHEN 325 MG/1
650 TABLET ORAL EVERY 6 HOURS SCHEDULED
Status: DISCONTINUED | OUTPATIENT
Start: 2025-03-15 | End: 2025-03-19

## 2025-03-15 RX ORDER — CEFAZOLIN SODIUM 2 G/100ML
2 INJECTION, SOLUTION INTRAVENOUS EVERY 8 HOURS
Status: COMPLETED | OUTPATIENT
Start: 2025-03-15 | End: 2025-03-16

## 2025-03-15 RX ORDER — CEFAZOLIN 1 G/1
INJECTION, POWDER, FOR SOLUTION INTRAVENOUS AS NEEDED
Status: DISCONTINUED | OUTPATIENT
Start: 2025-03-15 | End: 2025-03-15

## 2025-03-15 RX ORDER — OXYCODONE AND ACETAMINOPHEN 10; 325 MG/1; MG/1
1 TABLET ORAL EVERY 4 HOURS PRN
Status: DISCONTINUED | OUTPATIENT
Start: 2025-03-15 | End: 2025-03-18

## 2025-03-15 RX ORDER — LIDOCAINE HCL/PF 100 MG/5ML
SYRINGE (ML) INTRAVENOUS AS NEEDED
Status: DISCONTINUED | OUTPATIENT
Start: 2025-03-15 | End: 2025-03-15

## 2025-03-15 RX ORDER — OXYCODONE AND ACETAMINOPHEN 5; 325 MG/1; MG/1
0.5 TABLET ORAL EVERY 4 HOURS PRN
Status: DISCONTINUED | OUTPATIENT
Start: 2025-03-15 | End: 2025-03-18

## 2025-03-15 RX ORDER — LABETALOL HYDROCHLORIDE 5 MG/ML
5 INJECTION, SOLUTION INTRAVENOUS EVERY 10 MIN PRN
Status: DISCONTINUED | OUTPATIENT
Start: 2025-03-15 | End: 2025-03-15 | Stop reason: HOSPADM

## 2025-03-15 RX ORDER — OXYCODONE HYDROCHLORIDE 5 MG/1
10 TABLET ORAL EVERY 4 HOURS PRN
Status: DISCONTINUED | OUTPATIENT
Start: 2025-03-15 | End: 2025-03-15 | Stop reason: HOSPADM

## 2025-03-15 RX ORDER — SODIUM CHLORIDE 0.9 G/100ML
INJECTION, SOLUTION IRRIGATION AS NEEDED
Status: DISCONTINUED | OUTPATIENT
Start: 2025-03-15 | End: 2025-03-15 | Stop reason: HOSPADM

## 2025-03-15 RX ORDER — ONDANSETRON 4 MG/1
4 TABLET, FILM COATED ORAL EVERY 8 HOURS PRN
Status: DISCONTINUED | OUTPATIENT
Start: 2025-03-15 | End: 2025-03-20 | Stop reason: HOSPADM

## 2025-03-15 RX ORDER — LIDOCAINE HYDROCHLORIDE 10 MG/ML
0.1 INJECTION, SOLUTION INFILTRATION; PERINEURAL ONCE
Status: DISCONTINUED | OUTPATIENT
Start: 2025-03-15 | End: 2025-03-15 | Stop reason: HOSPADM

## 2025-03-15 RX ORDER — METOCLOPRAMIDE HYDROCHLORIDE 5 MG/ML
10 INJECTION INTRAMUSCULAR; INTRAVENOUS ONCE AS NEEDED
Status: DISCONTINUED | OUTPATIENT
Start: 2025-03-15 | End: 2025-03-15 | Stop reason: HOSPADM

## 2025-03-15 RX ORDER — SODIUM CHLORIDE, SODIUM LACTATE, POTASSIUM CHLORIDE, CALCIUM CHLORIDE 600; 310; 30; 20 MG/100ML; MG/100ML; MG/100ML; MG/100ML
50 INJECTION, SOLUTION INTRAVENOUS CONTINUOUS
Status: ACTIVE | OUTPATIENT
Start: 2025-03-15 | End: 2025-03-16

## 2025-03-15 RX ADMIN — FENTANYL CITRATE 25 MCG: 50 INJECTION, SOLUTION INTRAMUSCULAR; INTRAVENOUS at 08:18

## 2025-03-15 RX ADMIN — ACETAMINOPHEN 600 MG: 10 INJECTION INTRAVENOUS at 09:40

## 2025-03-15 RX ADMIN — LABETALOL HYDROCHLORIDE 5 MG: 5 INJECTION INTRAVENOUS at 08:46

## 2025-03-15 RX ADMIN — LIDOCAINE HYDROCHLORIDE 60 MG: 20 INJECTION, SOLUTION INTRAVENOUS at 08:18

## 2025-03-15 RX ADMIN — Medication 5 MG: at 20:57

## 2025-03-15 RX ADMIN — FENTANYL CITRATE 25 MCG: 50 INJECTION, SOLUTION INTRAMUSCULAR; INTRAVENOUS at 09:27

## 2025-03-15 RX ADMIN — OXYCODONE HYDROCHLORIDE AND ACETAMINOPHEN 1 TABLET: 5; 325 TABLET ORAL at 17:32

## 2025-03-15 RX ADMIN — ONDANSETRON 4 MG: 2 INJECTION, SOLUTION INTRAMUSCULAR; INTRAVENOUS at 08:18

## 2025-03-15 RX ADMIN — DEXAMETHASONE SODIUM PHOSPHATE 4 MG: 4 INJECTION, SOLUTION INTRAMUSCULAR; INTRAVENOUS at 08:18

## 2025-03-15 RX ADMIN — SODIUM CHLORIDE, POTASSIUM CHLORIDE, SODIUM LACTATE AND CALCIUM CHLORIDE: 600; 310; 30; 20 INJECTION, SOLUTION INTRAVENOUS at 08:10

## 2025-03-15 RX ADMIN — LABETALOL HYDROCHLORIDE 20 MG: 5 INJECTION INTRAVENOUS at 09:20

## 2025-03-15 RX ADMIN — Medication 2 L/MIN: at 10:55

## 2025-03-15 RX ADMIN — SENNOSIDES 10 ML: 8.8 LIQUID ORAL at 20:57

## 2025-03-15 RX ADMIN — PROPOFOL 40 MG: 10 INJECTION, EMULSION INTRAVENOUS at 08:18

## 2025-03-15 RX ADMIN — CEFAZOLIN SODIUM 2 G: 2 INJECTION, SOLUTION INTRAVENOUS at 17:23

## 2025-03-15 RX ADMIN — CEFAZOLIN 1 G: 1 INJECTION, POWDER, FOR SOLUTION INTRAMUSCULAR; INTRAVENOUS at 08:28

## 2025-03-15 RX ADMIN — ACETAMINOPHEN 650 MG: 325 TABLET, FILM COATED ORAL at 23:17

## 2025-03-15 RX ADMIN — LABETALOL HYDROCHLORIDE 5 MG: 5 INJECTION INTRAVENOUS at 09:13

## 2025-03-15 RX ADMIN — FENTANYL CITRATE 50 MCG: 50 INJECTION, SOLUTION INTRAMUSCULAR; INTRAVENOUS at 08:42

## 2025-03-15 RX ADMIN — SODIUM CHLORIDE, POTASSIUM CHLORIDE, SODIUM LACTATE AND CALCIUM CHLORIDE 50 ML/HR: 600; 310; 30; 20 INJECTION, SOLUTION INTRAVENOUS at 11:23

## 2025-03-15 RX ADMIN — ENOXAPARIN SODIUM 30 MG: 30 INJECTION SUBCUTANEOUS at 11:33

## 2025-03-15 RX ADMIN — ENOXAPARIN SODIUM 30 MG: 30 INJECTION SUBCUTANEOUS at 20:57

## 2025-03-15 SDOH — HEALTH STABILITY: MENTAL HEALTH: CURRENT SMOKER: 0

## 2025-03-15 ASSESSMENT — PAIN - FUNCTIONAL ASSESSMENT
PAIN_FUNCTIONAL_ASSESSMENT: 0-10

## 2025-03-15 ASSESSMENT — PAIN SCALES - PAIN ASSESSMENT IN ADVANCED DEMENTIA (PAINAD)
FACIALEXPRESSION: SAD, FRIGHTENED, FROWN
TOTALSCORE: 5
CONSOLABILITY: DISTRACTED OR REASSURED BY VOICE/TOUCH
BREATHING: NORMAL
NEGVOCALIZATION: OCCASIONAL MOAN/GROAN, LOW SPEECH, NEGATIVE/DISAPPROVING QUALITY
BODYLANGUAGE: RIGID, FISTS CLENCHED, KNEES UP, PUSHING/PULLING AWAY, STRIKES OUT

## 2025-03-15 ASSESSMENT — PAIN SCALES - GENERAL
PAIN_LEVEL: 4
PAINLEVEL_OUTOF10: 9
PAINLEVEL_OUTOF10: 9
PAINLEVEL_OUTOF10: 5 - MODERATE PAIN
PAINLEVEL_OUTOF10: 0 - NO PAIN

## 2025-03-15 ASSESSMENT — COGNITIVE AND FUNCTIONAL STATUS - GENERAL
TOILETING: A LOT
EATING MEALS: A LOT
CLIMB 3 TO 5 STEPS WITH RAILING: TOTAL
CLIMB 3 TO 5 STEPS WITH RAILING: TOTAL
WALKING IN HOSPITAL ROOM: A LOT
STANDING UP FROM CHAIR USING ARMS: A LOT
WALKING IN HOSPITAL ROOM: A LOT
TURNING FROM BACK TO SIDE WHILE IN FLAT BAD: A LOT
TOILETING: A LOT
DRESSING REGULAR UPPER BODY CLOTHING: A LOT
MOBILITY SCORE: 11
PERSONAL GROOMING: A LOT
DRESSING REGULAR UPPER BODY CLOTHING: A LOT
MOVING FROM LYING ON BACK TO SITTING ON SIDE OF FLAT BED WITH BEDRAILS: A LOT
DAILY ACTIVITIY SCORE: 12
EATING MEALS: A LOT
DRESSING REGULAR LOWER BODY CLOTHING: A LOT
MOBILITY SCORE: 11
HELP NEEDED FOR BATHING: A LOT
TURNING FROM BACK TO SIDE WHILE IN FLAT BAD: A LOT
MOVING TO AND FROM BED TO CHAIR: A LOT
DRESSING REGULAR LOWER BODY CLOTHING: A LOT
MOVING FROM LYING ON BACK TO SITTING ON SIDE OF FLAT BED WITH BEDRAILS: A LOT
STANDING UP FROM CHAIR USING ARMS: A LOT
HELP NEEDED FOR BATHING: A LOT
PERSONAL GROOMING: A LOT
DAILY ACTIVITIY SCORE: 12
MOVING TO AND FROM BED TO CHAIR: A LOT

## 2025-03-15 NOTE — CARE PLAN
The patient's goals for the shift include sleep    The clinical goals for the shift include comfort and safety      Problem: Skin  Goal: Decreased wound size/increased tissue granulation at next dressing change  Outcome: Progressing     Problem: Chronic Conditions and Co-morbidities  Goal: Patient's chronic conditions and co-morbidity symptoms are monitored and maintained or improved  Outcome: Progressing     Problem: Skin  Goal: Promote skin healing  Outcome: Progressing     Problem: Skin  Goal: Prevent/manage excess moisture  Outcome: Progressing

## 2025-03-15 NOTE — ANESTHESIA PROCEDURE NOTES
Airway  Date/Time: 3/15/2025 8:23 AM  Urgency: elective    Airway not difficult    Staffing  Performed: CRNA   Authorized by: ALBERTO Dunaway    Performed by: ALBERTO Dunaway  Patient location during procedure: OR    Indications and Patient Condition  Indications for airway management: anesthesia  Spontaneous Ventilation: absent  Sedation level: deep  Preoxygenated: yes  Patient position: sniffing  MILS not maintained throughout  Mask difficulty assessment: 0 - not attempted  Planned trial extubation    Final Airway Details  Final airway type: supraglottic airway      Successful airway: Size 3  Airway Seal Pressure (cm H2O): 20     Number of attempts at approach: 1  Ventilation between attempts: none  Number of other approaches attempted: 0

## 2025-03-15 NOTE — ANESTHESIA PREPROCEDURE EVALUATION
Patient: Julissa Plasencia    Procedure Information       Date/Time: 03/15/25 0800    Procedure: RIGHT HIP FRACTURE OPEN REDUCTION INTERNAL FIXATION WITH C-ARM (Right: Hip)    Location: [facility] OR 07 / [facility]    Surgeons: Momo Allan MD            Relevant Problems   Anesthesia (within normal limits)  Hx motion sickness      Cardiac   (+) Hypertension, essential   (+) New onset a-fib (Multi)   (+) Prolonged Q-T interval on ECG   (+) Rib pain      Pulmonary (within normal limits)      Neuro  Hx brain tumor   (+) Anxiety (Hx insomnia)   (+) Chronic headaches (Secondary to old head injury)   (+) Dementia (Confusion)   (+) Occlusion and stenosis of left carotid artery      GI (within normal limits)  Mild cachexia/malnutrition   (-) Chronic diarrhea (Hx constipation)      /Renal   (+) Urinary tract infection (Frequent UTI's)      Liver   (+) Elevated liver function tests   (+) Liver disease (Hx biliary acute pancreatitis)      Endocrine (within normal limits)  Vit D deficiency      Hematology   (+) Anemia (mild)      Musculoskeletal   (+) Osteoarthritis of both hips (Osteopenia/osteporosis, s/p L hip fx repair, now with R hip fx, for ORIF)      HEENT (within normal limits)      ID   (+) Urinary tract infection (Frequent UTI's)      Skin (within normal limits)      GYN  Hx vaginal prolapse   (+) Dysfunctional uterine bleeding       Clinical information reviewed:    Allergies  Meds     OB Status           NPO Detail:  No data recorded     Physical Exam    Airway  Mallampati: III  TM distance: >3 FB  Neck ROM: limited     Cardiovascular   Rhythm: regular  Rate: normal     Dental   (+) upper dentures, lower dentures     Pulmonary   Breath sounds clear to auscultation     Abdominal - normal exam  Abdomen: soft  Bowel sounds: normal     Other findings: Limited mouth opening and neck extension, edentulous          Anesthesia Plan    History of general anesthesia?: yes  History of complications of general anesthesia?:  no    ASA 3 - emergent     general   (Plan GA with LMA)  The patient is not a current smoker.  Patient was previously instructed to abstain from smoking on day of procedure.  Patient did not smoke on day of procedure.  Education provided regarding risk of obstructive sleep apnea.  intravenous induction   Postoperative administration of opioids is intended.  Trial extubation is planned.  Anesthetic plan and risks discussed with healthcare power of .  Use of blood products discussed with healthcare power of  who consented to blood products.    Plan discussed with CRNA and attending.

## 2025-03-15 NOTE — BRIEF OP NOTE
Date: 3/14/2025 - 3/15/2025  OR Location: [facility]    Name: Julissa Plasencia, : 3/1/1929, Age: 89 y.o., MRN: [id number], Sex: female    Diagnosis  Pre-op Diagnosis      * Closed intertrochanteric fracture of right femur, initial encounter [S72.141A] Post-op Diagnosis     * Closed intertrochanteric fracture of right femur, initial encounter [S72.141A]     Procedures  RIGHT HIP FRACTURE OPEN REDUCTION INTERNAL FIXATION WITH C-ARM  70562 - NH TX INTER/NH/SUBTRCHNTRIC FEM FX IMED IMPLTSCREW      Surgeons      * Momo Allan - Primary    Resident/Fellow/Other Assistant:  Surgeons and Role:  * No surgeons found with a matching role *    Staff:   Adamulator: Jazz Bass Person: Alisha  Surgical Assistant: Diana    Anesthesia Staff: Anesthesiologist: Pedro Pablo Louis MD  CRNA: CROW Dunaway-LUL    Procedure Summary  Anesthesia: General  ASA: III  Estimated Blood Loss: 150mL  Intra-op Medications:   Administrations occurring from 0800 to 1040 on 03/15/25:   Medication Name Total Dose   sodium chloride 0.9 % irrigation solution 700 mL   acetaminophen (Tylenol) tablet 650 mg Cannot be calculated   ceFAZolin (Ancef) vial 1 g 1 g   dexAMETHasone (Decadron) injection 4 mg/mL 4 mg   fentaNYL (Sublimaze) injection 50 mcg/mL 75 mcg   heparin (porcine) injection 5,000 Units Cannot be calculated   labetalol (Normodyne,Trandate) injection 5 mg   LR bolus Cannot be calculated   lidocaine (cardiac) injection 2% prefilled syringe 60 mg   melatonin tablet 5 mg Cannot be calculated   metoprolol succinate XL (Toprol-XL) 24 hr tablet 50 mg Cannot be calculated   morphine injection 1 mg Cannot be calculated   ondansetron (Zofran) 2 mg/mL injection 4 mg   perflutren lipid microspheres (Definity) injection 0.5-10 mL of dilution Cannot be calculated   perflutren protein A microsphere (Optison) injection 0.5 mL Cannot be calculated   polyethylene glycol (Glycolax, Miralax) packet 17 g Cannot be calculated   propofol (Diprivan) injection 10  mg/mL 40 mg   sulfur hexafluoride microsphr (Lumason) injection 24.28 mg Cannot be calculated   traMADol (Ultram) tablet 50 mg Cannot be calculated              Anesthesia Record               Intraprocedure I/O Totals          Intake    LR bolus 600.00 mL    Total Intake 600 mL       Output    Est. Blood Loss 150 mL    Total Output 150 mL       Net    Net Volume 450 mL          Specimen: No specimens collected               Findings: Displaced right femur intertrochanteric hip fracture    Complications:  None; patient tolerated the procedure well.     Disposition: PACU - hemodynamically stable.  Condition: stable  Specimens Collected: No specimens collected  Attending Attestation: I performed the procedure.    Momo Allan  Phone Number: [phone]

## 2025-03-15 NOTE — OP NOTE
RIGHT HIP FRACTURE OPEN REDUCTION INTERNAL FIXATION WITH C-ARM (R) Operative Note     Date: 3/14/2025 - 3/15/2025  OR Location: [facility]    Name: Julissa Plasencia : 3/1/1929, Age: 89 y.o., MRN: [id number], Sex: female    Diagnosis  Pre-op Diagnosis      * Closed intertrochanteric fracture of right femur, initial encounter [S72.141A] Post-op Diagnosis     * Closed intertrochanteric fracture of right femur, initial encounter [S72.141A]     Procedures  RIGHT HIP FRACTURE OPEN REDUCTION INTERNAL FIXATION WITH C-ARM  26562 - CT TX INTER/CT/SUBTRCHNTRIC FEM FX IMED IMPLTSCREW      Surgeons      * Momo Allan - Primary    Resident/Fellow/Other Assistant:  Surgeons and Role:  * No surgeons found with a matching role *    Staff:   Kali: Jazz Bass Person: Alisha  Surgical Assistant: Diana    Anesthesia Staff: Anesthesiologist: Pedro Pablo Luois MD  CRNA: CROW Dunaway-LUL    Procedure Summary  Anesthesia: General  ASA: III  Estimated Blood Loss: 150 mL  Intra-op Medications:   Administrations occurring from 0800 to 1040 on 03/15/25:   Medication Name Total Dose   sodium chloride 0.9 % irrigation solution 700 mL   acetaminophen (Tylenol) tablet 650 mg Cannot be calculated   ceFAZolin (Ancef) vial 1 g 1 g   dexAMETHasone (Decadron) injection 4 mg/mL 4 mg   fentaNYL (Sublimaze) injection 50 mcg/mL 75 mcg   heparin (porcine) injection 5,000 Units Cannot be calculated   labetalol (Normodyne,Trandate) injection 5 mg   LR bolus Cannot be calculated   lidocaine (cardiac) injection 2% prefilled syringe 60 mg   melatonin tablet 5 mg Cannot be calculated   metoprolol succinate XL (Toprol-XL) 24 hr tablet 50 mg Cannot be calculated   morphine injection 1 mg Cannot be calculated   ondansetron (Zofran) 2 mg/mL injection 4 mg   perflutren lipid microspheres (Definity) injection 0.5-10 mL of dilution Cannot be calculated   perflutren protein A microsphere (Optison) injection 0.5 mL Cannot be calculated   polyethylene glycol  (Glycolax, Miralax) packet 17 g Cannot be calculated   propofol (Diprivan) injection 10 mg/mL 40 mg   sulfur hexafluoride microsphr (Lumason) injection 24.28 mg Cannot be calculated   traMADol (Ultram) tablet 50 mg Cannot be calculated              Anesthesia Record               Intraprocedure I/O Totals          Intake    LR bolus 600.00 mL    Total Intake 600 mL       Output    Est. Blood Loss 150 mL    Total Output 150 mL       Net    Net Volume 450 mL          Specimen: No specimens collected              Drains and/or Catheters:   Urethral Catheter 16 Fr. (Active)   Site Assessment Clean;Skin intact 03/15/25 0421   Collection Container Leg bag 03/14/25 1245   Reason for Continuing Urinary Catheterization acute urinary retention 03/14/25 1245   Output (mL) 700 mL 03/14/25 1245   $ Urethral Catheter Charge Indwelling cath 03/14/25 1245       Tourniquet Times:         Implants:  Implants       Type Name Action Serial No.      Joint Hip NAIL, TFN ADV SHORT, 170MML, DIAMETER 11, 125 DEG - [id number] Implanted      Implant HELICAL BLADES, TFNA FENESTRATED, 85MM, STERILE - [id number] Implanted      Screw SCREW, LOCKING 5.0MM X 34MM TI STERILE - [id number] Implanted               Findings: Displaced right femur intertrochanteric hip fracture    Indications: Julissa Plasencia is an 89 y.o. female who is having surgery for Closed intertrochanteric fracture of right femur, initial encounter [S72.141A].  The patient had an unwitnessed fall while in a skilled nursing facility.  She was found to have sustained a right femur intertrochanteric hip fracture.  She was admitted to medicine.  She was medically optimized.  I discussed the patient's fracture with her son over the telephone since she has a history of dementia and he is power of .  I explained to him that typically this fracture is treated with placement of a right hip intramedullary nail.  I explained to him the risk, benefits alternatives of a right hip  intramedullary placement.  I explained to them that the risks of the procedure include but are not limited to infection, iatrogenic fracture, damage to nerve tendon blood vessels, malunion, nonunion, hardware failure, postoperative DVT and pulmonary emboli, as well as risks associate with anesthesia.  The patient's son voiced understanding and informed consent was obtained from him from a telephone call since he lives in [place].    The patient was seen in the preoperative area. The risks, benefits, complications, treatment options, non-operative alternatives, expected recovery and outcomes were discussed with the patient. The possibilities of reaction to medication, pulmonary aspiration, injury to surrounding structures, bleeding, recurrent infection, the need for additional procedures, failure to diagnose a condition, and creating a complication requiring transfusion or operation were discussed with the patient. The patient concurred with the proposed plan, giving informed consent.  The site of surgery was properly noted/marked if necessary per policy. The patient has been actively warmed in preoperative area. Preoperative antibiotics have been ordered and given within 1 hours of incision. Venous thrombosis prophylaxis have been ordered including unilateral sequential compression device    Procedure Details: The patient was properly identified in the preoperative waiting area and her right leg was marked as site of surgery.  Ancef was administered intravenously.  Patient was then taken back to the operating room suite and initially placed supine on the OR fracture table.  After general anesthesia with LMA was administered patient's right lower extremity was placed in traction using a boot.  Left lower extremity was placed in a Gustavo lithotomy position.  Longitudinal traction and internal rotation were used to obtain reduction.  C-arm fluoroscopy in orthogonal views was used to confirm fracture reduction.  I was  satisfied with this.  Patient's right lower extremity was then prepped and draped in the usual sterile fashion.  A preoperative verification timeout was taken.  Again C-arm fluoroscopy was used to confirm fracture reduction.  At this point approximately a 3 inch longitudinal incision was made proximal to the tip of the greater trochanter.  Sharp dissection was carried to bone.  Guidewire was inserted into the proximal femur.  C-arm fluoroscopy was used to confirm guidewire placement.  A 16mm reamer was used then over the guidewire to open up the proximal femur.  Guidewire and reamer were removed.  A Synthes 11 mm diameter with 125 mm neck angle short TFN was then inserted into the femur.  C-arm fluoroscopy was used to confirm placement of the TFN.  At this point a second 3-1/2 inch longitudinal incision was made along the lateral aspect of the proximal femur in line with the drill guides for the helical blade and distal locking screw.  Sharp dissection was carried to bone.  Guidewire was then placed through the femoral head and neck.  C arm fluoroscopy was used in orthogonal views to confirm guidewire placement    Satisfied with this.  I measured for an 85 mm long helical blade.  I reamed over the guidewire.  And then impacted in the 85 mm long helical blade.  Setscrew was placed from above.  Guidewire was removed.    I then drilled for and measured for 34 mm long distal locking screw.  A 34 mm locking screw was placed to the distal aspect of the TFN.  Insertion jig for the TFN was removed.  C-arm fluoroscopy in orthogonal views was used to confirm final fracture reduction and hardware placement.  Satisfied with this.  The wounds were copiously irrigated normal saline.  Deep fascia was closed with #1 Vicryl.  Subcutaneous tissue was closed with 2-0 Vicryl.  Skin was approximate with staples.  Sterile Mepilex dressing was applied.  The patient was awakened from anesthesia and returned to recovery room stable  condition.  There are no complications or any case.  All sponge and needle counts were correct at the end of the case.  Estimate blood this was 150 mL.    The patient will be weightbearing as tolerated in her right lower extremity.  She will be on Ancef for antibiotic prophylaxis and Lovenox for DVT prophylaxis.  Complications:  None; patient tolerated the procedure well.    Disposition: PACU - hemodynamically stable.  Condition: stable         Task Performed by RNFA or Surgical Assistant:  Surgical assistant helped with retraction and placement of the hardware.  The assistant also helped with layered closure of the IT band, subcutaneous tissue and skin.          Additional Details: The patient's son received a phone call following surgery informing him that the surgery went as planned.    Attending Attestation: I performed the procedure.    Momo Allan  Phone Number: [phone]

## 2025-03-15 NOTE — H&P
History Of Present Illness  Julissa Plasencia is a 89 y.o. female  with a past medical history of hypertension, confusion, headaches, weakness, falls, L hip and pelvic fracture who presented today from [facility] assisted living with hip fracture.  It was noted last night the patient had an unwitnessed fall at the assisted living a hip x-ray was done and revealed a right hip fracture so patient was sent to the emergency room for evaluation.  Patient herself is a poor historian and does not provide any meaningful information.  Reviewed DNR CCA form at bedside.    In the ED lab work, EKG, head CT, C-spine CT, CXR, and bilateral hip x-rays were performed.  Labs revealed , WBC 13.3, neutrophils 11.72, lymphocytes .69. UA had 11-20 RBC. EKG per ER physician impression:  A-fib with a ventricular rate of 101 bpm. Imaging positive for acute intertrochanteric fracture of the right hip. /94, VS otherwise stable.  Patient was given Zofran and morphine and Ortho was contacted.  She was admitted for further evaluation and treatment under the care of Dr. Andrew Alamo.    Unable to perform review of systems due to patient's confusion       Past Medical History  Past Medical History:   Diagnosis Date    Abnormal uterine and vaginal bleeding, unspecified 12/28/2016    Abnormal vaginal bleeding    Biliary acute pancreatitis without necrosis or infection ([facility]) 02/02/2017    Acute biliary pancreatitis without infection or necrosis    Brain tumor (Multi)     Motion sickness, initial encounter 10/11/2016    Sea sickness    Other female genital prolapse 06/13/2017    Pelvic relaxation    Other specified cough 12/30/2014    Cough due to ACE inhibitor    Pain in right shoulder 06/01/2017    Acute pain of right shoulder    Personal history of other diseases of the digestive system 06/05/2017    History of acute pancreatitis    Personal history of other diseases of the digestive system 03/06/2017    History of acute  "pancreatitis    Personal history of urinary (tract) infections 03/21/2017    History of urinary tract infection       Surgical History  Past Surgical History:   Procedure Laterality Date    COLONOSCOPY  08/12/2015    Complete Colonoscopy    ESOPHAGOGASTRODUODENOSCOPY  12/08/2015    Diagnostic Esophagogastroduodenoscopy      L hip fx repair     Social History  She reports that she has quit smoking. Her smoking use included cigarettes. She has never used smokeless tobacco. She reports that she does not drink alcohol and does not use drugs.    Family History  No family history on file.     Allergies  Lisinopril    Physical Exam  Constitutional:       Comments: thin   HENT:      Head: Normocephalic.      Nose: Nose normal.      Mouth/Throat:      Mouth: Mucous membranes are moist.   Eyes:      Conjunctiva/sclera: Conjunctivae normal.   Cardiovascular:      Rate and Rhythm: Normal rate. Rhythm irregular.      Heart sounds: Normal heart sounds.   Pulmonary:      Effort: Pulmonary effort is normal.      Breath sounds: Rales present.   Abdominal:      General: Bowel sounds are normal.      Palpations: Abdomen is soft.      Tenderness: There is abdominal tenderness.   Musculoskeletal:      Cervical back: Neck supple.      Comments: Generalized weakness right lower extremity shortened and slightly internally rotated   Skin:     General: Skin is warm and dry.   Neurological:      Mental Status: She is alert. Mental status is at baseline.   Psychiatric:         Mood and Affect: Mood normal.          Last Recorded Vitals  Blood pressure 127/58, pulse 92, temperature 36.1 °C (97 °F), temperature source Temporal, resp. rate 18, height 1.575 m (5' 2\"), weight 45.4 kg (100 lb), SpO2 92%.    Relevant Results    No current facility-administered medications on file prior to encounter.     Current Outpatient Medications on File Prior to Encounter   Medication Sig Dispense Refill    acetaminophen (Tylenol) 325 mg tablet Take 2 tablets " (650 mg) by mouth every 6 hours if needed for mild pain (1 - 3) or fever (temp greater than 38.0 C).      cholecalciferol (Vitamin D-3) 50 mcg (2,000 unit) capsule Take 1 capsule (50 mcg) by mouth once daily.      diphenhydrAMINE-acetaminophen (Tylenol PM)  mg per tablet Take 1 tablet by mouth once daily at bedtime.      melatonin 5 mg tablet Take 1 tablet (5 mg) by mouth once daily at bedtime.      metoprolol succinate XL (Toprol-XL) 50 mg 24 hr tablet TAKE 1 TABLET BY MOUTH ONCE  DAILY 90 tablet 3    polyethylene glycol (Glycolax, Miralax) 17 gram packet Take 17 g by mouth once daily.      traMADol (Ultram) 50 mg tablet Take 1 tablet (50 mg) by mouth every 8 hours if needed for moderate pain (4 - 6). 20 tablet 0      Results for orders placed or performed during the hospital encounter of 03/14/25 (from the past 24 hours)   Sars-CoV-2 and Influenza A/B PCR   Result Value Ref Range    Flu A Result Not Detected Not Detected    Flu B Result Not Detected Not Detected    Coronavirus 2019, PCR Not Detected Not Detected   Troponin I, High Sensitivity, Initial   Result Value Ref Range    Troponin I, High Sensitivity 18 (H) 0 - 13 ng/L   Troponin, High Sensitivity, 1 Hour   Result Value Ref Range    Troponin I, High Sensitivity 17 (H) 0 - 13 ng/L   CBC   Result Value Ref Range    WBC 13.1 (H) 4.4 - 11.3 x10*3/uL    nRBC 0.0 0.0 - 0.0 /100 WBCs    RBC 3.53 (L) 4.00 - 5.20 x10*6/uL    Hemoglobin 10.8 (L) 12.0 - 16.0 g/dL    Hematocrit 32.6 (L) 36.0 - 46.0 %    MCV 92 80 - 100 fL    MCH 30.6 26.0 - 34.0 pg    MCHC 33.1 32.0 - 36.0 g/dL    RDW 13.7 11.5 - 14.5 %    Platelets 299 150 - 450 x10*3/uL   Basic metabolic panel   Result Value Ref Range    Glucose 114 (H) 74 - 99 mg/dL    Sodium 134 (L) 136 - 145 mmol/L    Potassium 4.5 3.5 - 5.3 mmol/L    Chloride 97 (L) 98 - 107 mmol/L    Bicarbonate 28 21 - 32 mmol/L    Anion Gap 14 10 - 20 mmol/L    Urea Nitrogen 16 6 - 23 mg/dL    Creatinine 0.49 (L) 0.50 - 1.05 mg/dL     eGFR 86 >60 mL/min/1.73m*2    Calcium 8.9 8.6 - 10.3 mg/dL        XR hip right with pelvis when performed 1 view    Result Date: 3/15/2025  Interpreted By:  Freddy Freeman, STUDY: XR HIP RIGHT WITH PELVIS WHEN PERFORMED 1 VIEW; ;  3/15/2025 10:04 am   INDICATION: Signs/Symptoms:Placement of right hip intramedullary nail.     COMPARISON: None.   ACCESSION NUMBER(S): [id number]   ORDERING CLINICIAN: SOLO BRADLEY   FINDINGS: A minimum of 2 orthogonal views are necessary to completely exclude a fracture.   Status post right hip nail and m kathy transfixing the proximal femoral fracture. Alignment is grossly normal on this single view. Soft tissue air and surgical clips from recent surgery.   Generalized osteopenia.       Status post surgery without complicating features.     MACRO: None   Signed by: Freddy Freeman 3/15/2025 10:37 AM Dictation workstation:   [id number]    FL less than 1 hour    Result Date: 3/15/2025  These images are not reportable by radiology and will not be interpreted by  Radiologists.    CT head wo IV contrast    Result Date: 3/14/2025  Interpreted By:  Thais Bennett, STUDY: CT HEAD WO IV CONTRAST; CT CERVICAL SPINE WO IV CONTRAST;  3/14/2025 2:02 pm   INDICATION: Signs/Symptoms:head injury; Signs/Symptoms:fall.   COMPARISON: CT head 12/11/2024, CT C-spine 12/11/2024   ACCESSION NUMBER(S): [id number]; [id number]   ORDERING CLINICIAN: LEIDY GAMBLE   TECHNIQUE: CT scan of the head and cervical spine was performed without intravenous contrast.   FINDINGS: CT HEAD:   Parenchyma: No intracranial hemorrhage. The grey-white differentiation is intact. No mass effect or midline shift. Similar scattered periventricular white matter hypodensities, likely chronic microvascular ischemic change. Similar moderate to severe parenchymal atrophy. Unchanged 1.3 cm left superior frontal calcified meningioma.   CSF Spaces: The ventricles, sulci and basal cisterns are proportional to the degree of  parenchymal volume loss.   Extra-Axial Fluid: None.   Calvarium: No acute depressed fracture.   Paranasal sinuses: Clear.   Mastoids: Clear.   Orbits: Bilateral lens replacements.   Soft tissues: No scalp hematoma.     CT CERVICAL SPINE:   Prevertebral/Paraspinal Soft Tissues: No acute traumatic injury.   Hardware: None. Fracture: None. Vertebral Body Heights: Normal. Alignment: No traumatic listhesis. Spinal canal and neural foramina: No high-grade canal stenosis or neural foraminal narrowing.       No acute intracranial hemorrhage or depressed calvarial fracture.   No acute fracture or traumatic malalignment of the cervical spine.   MACRO None     Signed by: Thais Bennett 3/14/2025 2:25 PM Dictation workstation:   [id number]    CT cervical spine wo IV contrast    Result Date: 3/14/2025  Interpreted By:  Thais Bennett, STUDY: CT HEAD WO IV CONTRAST; CT CERVICAL SPINE WO IV CONTRAST;  3/14/2025 2:02 pm   INDICATION: Signs/Symptoms:head injury; Signs/Symptoms:fall.   COMPARISON: CT head 12/11/2024, CT C-spine 12/11/2024   ACCESSION NUMBER(S): [id number]; [id number]   ORDERING CLINICIAN: LEIDY GAMBLE   TECHNIQUE: CT scan of the head and cervical spine was performed without intravenous contrast.   FINDINGS: CT HEAD:   Parenchyma: No intracranial hemorrhage. The grey-white differentiation is intact. No mass effect or midline shift. Similar scattered periventricular white matter hypodensities, likely chronic microvascular ischemic change. Similar moderate to severe parenchymal atrophy. Unchanged 1.3 cm left superior frontal calcified meningioma.   CSF Spaces: The ventricles, sulci and basal cisterns are proportional to the degree of parenchymal volume loss.   Extra-Axial Fluid: None.   Calvarium: No acute depressed fracture.   Paranasal sinuses: Clear.   Mastoids: Clear.   Orbits: Bilateral lens replacements.   Soft tissues: No scalp hematoma.     CT CERVICAL SPINE:   Prevertebral/Paraspinal Soft Tissues: No  acute traumatic injury.   Hardware: None. Fracture: None. Vertebral Body Heights: Normal. Alignment: No traumatic listhesis. Spinal canal and neural foramina: No high-grade canal stenosis or neural foraminal narrowing.       No acute intracranial hemorrhage or depressed calvarial fracture.   No acute fracture or traumatic malalignment of the cervical spine.   MACRO None     Signed by: Thais Bennett 3/14/2025 2:25 PM Dictation workstation:   [id number]    XR chest 1 view    Result Date: 3/14/2025  STUDY: Chest Radiograph;  3/14/25 at 11:23 AM INDICATION: Medical clearance. COMPARISON: CT chest and chest XR 12/11/24. ACCESSION NUMBER(S): [id number] ORDERING CLINICIAN: LEIDY GAMBLE TECHNIQUE:  Frontal chest was obtained at 1122 hours. FINDINGS: CARDIOMEDIASTINAL SILHOUETTE: Cardiomediastinal silhouette is normal in size and configuration.  LUNGS: There are emphysematous changes. Calcified granuloma in the right base.  ABDOMEN: No remarkable upper abdominal findings.  BONES: No acute osseous changes.    Emphysema. No acute process. Signed by Ruiz Chaparro MD    XR hip right with pelvis when performed 2 or 3 views    Result Date: 3/14/2025  STUDY: Pelvis and Right Hip Radiographs; 3/14/2025 11:22 AM. INDICATION: Hip fracture. COMPARISON: Pelvis and left hip radiographs 12/11/2024. ACCESSION NUMBER(S): [id number] ORDERING CLINICIAN: LEIDY GAMBLE TECHNIQUE:  AP view of the pelvis and two views of the right hip (four images obtained). FINDINGS:  PELVIS: The pelvic ring is intact.  There is no acute fracture. RIGHT HIP: There is an acute intertrochanteric fracture of the right hip. Postsurgical changes of the left hip.  No soft tissue abnormality is seen.    Acute intertrochanteric fracture of the right hip. Signed by Ruiz Chaparro MD         Assessment/Plan   Assessment & Plan  Fall, initial encounter    Closed intertrochanteric fracture of right femur, initial encounter    New onset a-fib  (Multi)    Leukocytosis    Prolonged Q-T interval on ECG    Protein calorie malnutrition (Multi)    Osteoarthritis of both hips    Anxiety    Dementia    Chronic headaches    Urinary tract infection    Elevated liver function tests    Liver disease    Dysfunctional uterine bleeding    Anemia      Admit to tele  Appreciate ortho recs  NPO after mn  Morphine for breakthrough pain  Bedrest  Check covid/flu  Am CBC, BMP  Add mag  Leukocytosis may be a result of trauma, will hold antibiotics at this time.  Dietician consult    New Afib    Appreciate cardio recs  Check trops  Echo  Continue home lopressor  [id number], however with surgery likely and frequent fall will defer to cardiology for possible AC     Prolonged QT  Monitor on tele     Chronic conditions: Hypertension, headaches  Continue home medications as listed above     DVT prophylaxis  SCDs  Heparin, hold am dose  Patient fully evaluated on March 15.  Patient cleared for surgical intervention.  Patient fully evaluated postop.  Continue to monitor heart rate and recheck labs in AM.      Jasmeet Granados MD

## 2025-03-15 NOTE — CARE PLAN
The patient's goals for the shift include sleep    The clinical goals for the shift include comfort and rest    Over the shift, the patient did not make progress toward the following goals. Barriers to progression include acute illness. Recommendations to address these barriers include pain management and provide a quiet environment.

## 2025-03-15 NOTE — ANESTHESIA POSTPROCEDURE EVALUATION
Patient: Julissa Plasencia    Procedure Summary       Date: 03/15/25 Room / Location: [facility]  / [facility]    Anesthesia Start: 0810 Anesthesia Stop: 0929    Procedure: RIGHT HIP FRACTURE OPEN REDUCTION INTERNAL FIXATION WITH C-ARM (Right: Hip) Diagnosis:       Closed intertrochanteric fracture of right femur, initial encounter      (Closed intertrochanteric fracture of right femur, initial encounter [S72.141A])    Surgeons: Momo Allan MD Responsible Provider: Pedro Pablo Louis MD    Anesthesia Type: general ASA Status: 3 - Emergent            Anesthesia Type: general    Vitals Value Taken Time   /56 03/15/25 0929   Temp 36 03/15/25 0929   Pulse 68 03/15/25 0929   Resp 14 03/15/25 0929   SpO2 100 03/15/25 0929       Anesthesia Post Evaluation    Patient location during evaluation: PACU  Patient participation: complete - patient cannot participate  Level of consciousness: sleepy but conscious  Pain score: 4  Pain management: adequate  Multimodal analgesia pain management approach  Airway patency: patent  Cardiovascular status: acceptable  Respiratory status: acceptable  Hydration status: acceptable  Postoperative Nausea and Vomiting: none  Comments: Fentanyl given        There were no known notable events for this encounter.

## 2025-03-16 VITALS
RESPIRATION RATE: 18 BRPM | HEIGHT: 62 IN | TEMPERATURE: 97.7 F | WEIGHT: 100 LBS | BODY MASS INDEX: 18.4 KG/M2 | HEART RATE: 84 BPM | OXYGEN SATURATION: 95 % | SYSTOLIC BLOOD PRESSURE: 135 MMHG | DIASTOLIC BLOOD PRESSURE: 60 MMHG

## 2025-03-16 LAB
ALBUMIN SERPL BCP-MCNC: 3.4 G/DL (ref 3.4–5)
ALP SERPL-CCNC: 88 U/L (ref 33–136)
ALT SERPL W P-5'-P-CCNC: 11 U/L (ref 7–45)
ANION GAP SERPL CALC-SCNC: 13 MMOL/L (ref 10–20)
AST SERPL W P-5'-P-CCNC: 21 U/L (ref 9–39)
BILIRUB SERPL-MCNC: 0.4 MG/DL (ref 0–1.2)
BUN SERPL-MCNC: 22 MG/DL (ref 6–23)
CALCIUM SERPL-MCNC: 8.7 MG/DL (ref 8.6–10.3)
CHLORIDE SERPL-SCNC: 96 MMOL/L (ref 98–107)
CO2 SERPL-SCNC: 28 MMOL/L (ref 21–32)
CREAT SERPL-MCNC: 0.55 MG/DL (ref 0.5–1.05)
EGFRCR SERPLBLD CKD-EPI 2021: 84 ML/MIN/1.73M*2
ERYTHROCYTE [DISTWIDTH] IN BLOOD BY AUTOMATED COUNT: 13.8 % (ref 11.5–14.5)
GLUCOSE SERPL-MCNC: 100 MG/DL (ref 74–99)
HCT VFR BLD AUTO: 26.7 % (ref 36–46)
HGB BLD-MCNC: 8.6 G/DL (ref 12–16)
MCH RBC QN AUTO: 30.4 PG (ref 26–34)
MCHC RBC AUTO-ENTMCNC: 32.2 G/DL (ref 32–36)
MCV RBC AUTO: 94 FL (ref 80–100)
NRBC BLD-RTO: 0 /100 WBCS (ref 0–0)
PLATELET # BLD AUTO: 239 X10*3/UL (ref 150–450)
POTASSIUM SERPL-SCNC: 4.2 MMOL/L (ref 3.5–5.3)
PROT SERPL-MCNC: 6.2 G/DL (ref 6.4–8.2)
RBC # BLD AUTO: 2.83 X10*6/UL (ref 4–5.2)
SODIUM SERPL-SCNC: 133 MMOL/L (ref 136–145)
WBC # BLD AUTO: 15.7 X10*3/UL (ref 4.4–11.3)

## 2025-03-16 PROCEDURE — 2500000001 HC RX 250 WO HCPCS SELF ADMINISTERED DRUGS (ALT 637 FOR MEDICARE OP): Performed by: ORTHOPAEDIC SURGERY

## 2025-03-16 PROCEDURE — 99222 1ST HOSP IP/OBS MODERATE 55: CPT | Performed by: STUDENT IN AN ORGANIZED HEALTH CARE EDUCATION/TRAINING PROGRAM

## 2025-03-16 PROCEDURE — 99024 POSTOP FOLLOW-UP VISIT: CPT | Performed by: ORTHOPAEDIC SURGERY

## 2025-03-16 PROCEDURE — 1210000001 HC SEMI-PRIVATE ROOM DAILY

## 2025-03-16 PROCEDURE — 80053 COMPREHEN METABOLIC PANEL: CPT | Performed by: INTERNAL MEDICINE

## 2025-03-16 PROCEDURE — 85027 COMPLETE CBC AUTOMATED: CPT | Performed by: ORTHOPAEDIC SURGERY

## 2025-03-16 PROCEDURE — 36415 COLL VENOUS BLD VENIPUNCTURE: CPT | Performed by: INTERNAL MEDICINE

## 2025-03-16 PROCEDURE — 2500000004 HC RX 250 GENERAL PHARMACY W/ HCPCS (ALT 636 FOR OP/ED): Performed by: ORTHOPAEDIC SURGERY

## 2025-03-16 RX ADMIN — ACETAMINOPHEN 650 MG: 325 TABLET, FILM COATED ORAL at 18:11

## 2025-03-16 RX ADMIN — ENOXAPARIN SODIUM 30 MG: 30 INJECTION SUBCUTANEOUS at 10:40

## 2025-03-16 RX ADMIN — SENNOSIDES 10 ML: 8.8 LIQUID ORAL at 10:40

## 2025-03-16 RX ADMIN — CEFAZOLIN SODIUM 2 G: 2 INJECTION, SOLUTION INTRAVENOUS at 01:44

## 2025-03-16 RX ADMIN — ACETAMINOPHEN 650 MG: 325 TABLET, FILM COATED ORAL at 10:42

## 2025-03-16 RX ADMIN — METOPROLOL SUCCINATE 50 MG: 50 TABLET, EXTENDED RELEASE ORAL at 10:40

## 2025-03-16 RX ADMIN — POLYETHYLENE GLYCOL 3350 17 G: 17 POWDER, FOR SOLUTION ORAL at 10:40

## 2025-03-16 RX ADMIN — ACETAMINOPHEN 650 MG: 325 TABLET, FILM COATED ORAL at 05:20

## 2025-03-16 RX ADMIN — Medication 5 MG: at 21:17

## 2025-03-16 RX ADMIN — OXYCODONE AND ACETAMINOPHEN 1 TABLET: 10; 325 TABLET ORAL at 10:42

## 2025-03-16 RX ADMIN — SENNOSIDES 10 ML: 8.8 LIQUID ORAL at 21:17

## 2025-03-16 RX ADMIN — ENOXAPARIN SODIUM 30 MG: 30 INJECTION SUBCUTANEOUS at 21:17

## 2025-03-16 ASSESSMENT — PAIN SCALES - GENERAL
PAINLEVEL_OUTOF10: 7
PAINLEVEL_OUTOF10: 0 - NO PAIN
PAINLEVEL_OUTOF10: 0 - NO PAIN

## 2025-03-16 ASSESSMENT — PAIN - FUNCTIONAL ASSESSMENT
PAIN_FUNCTIONAL_ASSESSMENT: WONG-BAKER FACES
PAIN_FUNCTIONAL_ASSESSMENT: WONG-BAKER FACES

## 2025-03-16 ASSESSMENT — PAIN DESCRIPTION - DESCRIPTORS: DESCRIPTORS: ACHING

## 2025-03-16 ASSESSMENT — PAIN SCALES - WONG BAKER: WONGBAKER_NUMERICALRESPONSE: NO HURT

## 2025-03-16 NOTE — CARE PLAN
Problem: Pain - Adult  Goal: Verbalizes/displays adequate comfort level or baseline comfort level  Outcome: Progressing     Problem: Safety - Adult  Goal: Free from fall injury  Outcome: Progressing     Problem: Discharge Planning  Goal: Discharge to home or other facility with appropriate resources  Outcome: Progressing     Problem: Chronic Conditions and Co-morbidities  Goal: Patient's chronic conditions and co-morbidity symptoms are monitored and maintained or improved  Outcome: Progressing     Problem: Nutrition  Goal: Nutrient intake appropriate for maintaining nutritional needs  Outcome: Progressing     Problem: Skin  Goal: Decreased wound size/increased tissue granulation at next dressing change  Outcome: Progressing  Goal: Participates in plan/prevention/treatment measures  Outcome: Progressing  Goal: Prevent/manage excess moisture  Outcome: Progressing  Goal: Prevent/minimize sheer/friction injuries  Outcome: Progressing  Goal: Promote/optimize nutrition  Outcome: Progressing  Goal: Promote skin healing  Outcome: Progressing   The patient's goals for the shift include sleep    The clinical goals for the shift include comfort and safety    Over the shift, the patient did not make progress toward the following goals. Barriers to progression include patient experiencing falls and confusion. Recommendations to address these barriers include remind patient to use call light and frequent rounds on patient to prevent falls and injury.

## 2025-03-16 NOTE — PROGRESS NOTES
"Julissa Plasencia is a 89 y.o. female on day 2 of admission presenting with Fall, initial encounter.    Subjective   Patient is postoperative day 1 following a right hip intramedullary nail placement.  Patient remains confused at baseline.       Objective     Physical Exam  Patient is confused but in no acute distress.  She was not answering simple questions or following commands.  Her right hip bandage has mild bloody shadow proximally.  Her thigh compartments are soft.  The patient was voluntarily moving her right foot and ankle.    Last Recorded Vitals  Blood pressure (!) 153/109, pulse 85, temperature 37 °C (98.6 °F), resp. rate 18, height 1.575 m (5' 2\"), weight 45.4 kg (100 lb), SpO2 92%.  Intake/Output last 3 Shifts:  I/O last 3 completed shifts:  In: 1175.8 (25.9 mL/kg) [P.O.:240; I.V.:335.8 (7.4 mL/kg); IV Piggyback:600]  Out: 500 (11 mL/kg) [Urine:350 (0.2 mL/kg/hr); Blood:150]  Weight: 45.4 kg     Relevant Results      Scheduled medications  acetaminophen, 650 mg, oral, q6h SHIV  enoxaparin, 30 mg, subcutaneous, q12h SHIV  melatonin, 5 mg, oral, Nightly  metoprolol succinate XL, 50 mg, oral, Daily  perflutren lipid microspheres, 0.5-10 mL of dilution, intravenous, Once in imaging  perflutren protein A microsphere, 0.5 mL, intravenous, Once in imaging  polyethylene glycol, 17 g, oral, Daily  senna, 10 mL, oral, BID  sulfur hexafluoride microsphr, 2 mL, intravenous, Once in imaging      Continuous medications  lactated Ringer's, 50 mL/hr, Last Rate: Stopped (03/15/25 1806)  oxygen, 2 L/min      PRN medications  PRN medications: acetaminophen, morphine, naloxone, ondansetron **OR** ondansetron, oxyCODONE-acetaminophen, oxyCODONE-acetaminophen, oxyCODONE-acetaminophen  Results for orders placed or performed during the hospital encounter of 03/14/25 (from the past 24 hours)   CBC   Result Value Ref Range    WBC 15.7 (H) 4.4 - 11.3 x10*3/uL    nRBC 0.0 0.0 - 0.0 /100 WBCs    RBC 2.83 (L) 4.00 - 5.20 x10*6/uL    " Hemoglobin 8.6 (L) 12.0 - 16.0 g/dL    Hematocrit 26.7 (L) 36.0 - 46.0 %    MCV 94 80 - 100 fL    MCH 30.4 26.0 - 34.0 pg    MCHC 32.2 32.0 - 36.0 g/dL    RDW 13.8 11.5 - 14.5 %    Platelets 239 150 - 450 x10*3/uL   Comprehensive Metabolic Panel   Result Value Ref Range    Glucose 100 (H) 74 - 99 mg/dL    Sodium 133 (L) 136 - 145 mmol/L    Potassium 4.2 3.5 - 5.3 mmol/L    Chloride 96 (L) 98 - 107 mmol/L    Bicarbonate 28 21 - 32 mmol/L    Anion Gap 13 10 - 20 mmol/L    Urea Nitrogen 22 6 - 23 mg/dL    Creatinine 0.55 0.50 - 1.05 mg/dL    eGFR 84 >60 mL/min/1.73m*2    Calcium 8.7 8.6 - 10.3 mg/dL    Albumin 3.4 3.4 - 5.0 g/dL    Alkaline Phosphatase 88 33 - 136 U/L    Total Protein 6.2 (L) 6.4 - 8.2 g/dL    AST 21 9 - 39 U/L    Bilirubin, Total 0.4 0.0 - 1.2 mg/dL    ALT 11 7 - 45 U/L          This patient currently has cardiac telemetry ordered; if you would like to modify or discontinue the telemetry order, click here to go to the orders activity to modify/discontinue the order.    This patient has a urinary catheter   Reason for the urinary catheter remaining today? perioperative use for selected surgical procedures               Assessment/Plan   Assessment & Plan  Fall, initial encounter    Closed intertrochanteric fracture of right femur, initial encounter    New onset a-fib (Multi)    Leukocytosis    Prolonged Q-T interval on ECG    Protein calorie malnutrition (Multi)    Osteoarthritis of both hips    Anxiety    Dementia    Chronic headaches    Urinary tract infection    Elevated liver function tests    Liver disease    Dysfunctional uterine bleeding    Anemia    Is doing satisfactory following her right hip intervention nail placement.  She is weightbearing as tolerated on her right lower extremity.  She will be up with physical therapy.  Patient will likely need discharge to a skilled nursing facility.       I spent 15 minutes in the professional and overall care of this patient.      Momo Allan,  MD

## 2025-03-17 ENCOUNTER — APPOINTMENT (OUTPATIENT)
Dept: CARDIOLOGY | Facility: HOSPITAL | Age: OVER 89
DRG: 481 | End: 2025-03-17
Payer: MEDICARE

## 2025-03-17 LAB
ANION GAP SERPL CALC-SCNC: 11 MMOL/L (ref 10–20)
BUN SERPL-MCNC: 17 MG/DL (ref 6–23)
CALCIUM SERPL-MCNC: 8.5 MG/DL (ref 8.6–10.3)
CHLORIDE SERPL-SCNC: 96 MMOL/L (ref 98–107)
CO2 SERPL-SCNC: 30 MMOL/L (ref 21–32)
CREAT SERPL-MCNC: 0.43 MG/DL (ref 0.5–1.05)
EGFRCR SERPLBLD CKD-EPI 2021: 89 ML/MIN/1.73M*2
ERYTHROCYTE [DISTWIDTH] IN BLOOD BY AUTOMATED COUNT: 13.7 % (ref 11.5–14.5)
GLUCOSE BLD MANUAL STRIP-MCNC: 91 MG/DL (ref 74–99)
GLUCOSE SERPL-MCNC: 104 MG/DL (ref 74–99)
HCT VFR BLD AUTO: 25.4 % (ref 36–46)
HGB BLD-MCNC: 8.1 G/DL (ref 12–16)
MCH RBC QN AUTO: 30 PG (ref 26–34)
MCHC RBC AUTO-ENTMCNC: 31.9 G/DL (ref 32–36)
MCV RBC AUTO: 94 FL (ref 80–100)
NRBC BLD-RTO: 0 /100 WBCS (ref 0–0)
PLATELET # BLD AUTO: 241 X10*3/UL (ref 150–450)
POTASSIUM SERPL-SCNC: 4.3 MMOL/L (ref 3.5–5.3)
RBC # BLD AUTO: 2.7 X10*6/UL (ref 4–5.2)
SODIUM SERPL-SCNC: 133 MMOL/L (ref 136–145)
WBC # BLD AUTO: 13 X10*3/UL (ref 4.4–11.3)

## 2025-03-17 PROCEDURE — 2500000004 HC RX 250 GENERAL PHARMACY W/ HCPCS (ALT 636 FOR OP/ED): Performed by: ORTHOPAEDIC SURGERY

## 2025-03-17 PROCEDURE — 82947 ASSAY GLUCOSE BLOOD QUANT: CPT

## 2025-03-17 PROCEDURE — 97165 OT EVAL LOW COMPLEX 30 MIN: CPT | Mod: GO

## 2025-03-17 PROCEDURE — 2500000001 HC RX 250 WO HCPCS SELF ADMINISTERED DRUGS (ALT 637 FOR MEDICARE OP): Performed by: ORTHOPAEDIC SURGERY

## 2025-03-17 PROCEDURE — 36415 COLL VENOUS BLD VENIPUNCTURE: CPT | Performed by: INTERNAL MEDICINE

## 2025-03-17 PROCEDURE — 85027 COMPLETE CBC AUTOMATED: CPT | Performed by: INTERNAL MEDICINE

## 2025-03-17 PROCEDURE — 93306 TTE W/DOPPLER COMPLETE: CPT

## 2025-03-17 PROCEDURE — 99024 POSTOP FOLLOW-UP VISIT: CPT | Performed by: ORTHOPAEDIC SURGERY

## 2025-03-17 PROCEDURE — 97161 PT EVAL LOW COMPLEX 20 MIN: CPT | Mod: GP

## 2025-03-17 PROCEDURE — 93306 TTE W/DOPPLER COMPLETE: CPT | Performed by: STUDENT IN AN ORGANIZED HEALTH CARE EDUCATION/TRAINING PROGRAM

## 2025-03-17 PROCEDURE — 80048 BASIC METABOLIC PNL TOTAL CA: CPT | Performed by: INTERNAL MEDICINE

## 2025-03-17 PROCEDURE — 1210000001 HC SEMI-PRIVATE ROOM DAILY

## 2025-03-17 RX ADMIN — ACETAMINOPHEN 650 MG: 325 TABLET, FILM COATED ORAL at 20:21

## 2025-03-17 RX ADMIN — SENNOSIDES 10 ML: 8.8 LIQUID ORAL at 20:22

## 2025-03-17 RX ADMIN — Medication 5 MG: at 20:21

## 2025-03-17 RX ADMIN — ACETAMINOPHEN 650 MG: 325 TABLET, FILM COATED ORAL at 12:56

## 2025-03-17 RX ADMIN — POLYETHYLENE GLYCOL 3350 17 G: 17 POWDER, FOR SOLUTION ORAL at 10:10

## 2025-03-17 RX ADMIN — ACETAMINOPHEN 650 MG: 325 TABLET, FILM COATED ORAL at 00:23

## 2025-03-17 RX ADMIN — ENOXAPARIN SODIUM 30 MG: 30 INJECTION SUBCUTANEOUS at 10:11

## 2025-03-17 RX ADMIN — SENNOSIDES 10 ML: 8.8 LIQUID ORAL at 10:10

## 2025-03-17 RX ADMIN — ACETAMINOPHEN 650 MG: 325 TABLET, FILM COATED ORAL at 06:50

## 2025-03-17 RX ADMIN — ENOXAPARIN SODIUM 30 MG: 30 INJECTION SUBCUTANEOUS at 20:32

## 2025-03-17 RX ADMIN — METOPROLOL SUCCINATE 50 MG: 50 TABLET, EXTENDED RELEASE ORAL at 10:10

## 2025-03-17 ASSESSMENT — ACTIVITIES OF DAILY LIVING (ADL): BATHING_ASSISTANCE: MAXIMAL

## 2025-03-17 ASSESSMENT — COGNITIVE AND FUNCTIONAL STATUS - GENERAL
MOVING FROM LYING ON BACK TO SITTING ON SIDE OF FLAT BED WITH BEDRAILS: A LITTLE
MOVING TO AND FROM BED TO CHAIR: A LITTLE
EATING MEALS: A LOT
MOBILITY SCORE: 14
WALKING IN HOSPITAL ROOM: A LOT
CLIMB 3 TO 5 STEPS WITH RAILING: TOTAL
TURNING FROM BACK TO SIDE WHILE IN FLAT BAD: A LITTLE
PERSONAL GROOMING: A LOT
DRESSING REGULAR UPPER BODY CLOTHING: A LOT
DAILY ACTIVITIY SCORE: 10
TOILETING: TOTAL
STANDING UP FROM CHAIR USING ARMS: A LOT
HELP NEEDED FOR BATHING: A LOT
DRESSING REGULAR LOWER BODY CLOTHING: TOTAL

## 2025-03-17 ASSESSMENT — PAIN SCALES - WONG BAKER: WONGBAKER_NUMERICALRESPONSE: NO HURT

## 2025-03-17 ASSESSMENT — PAIN SCALES - GENERAL
PAINLEVEL_OUTOF10: 0 - NO PAIN

## 2025-03-17 ASSESSMENT — PAIN - FUNCTIONAL ASSESSMENT
PAIN_FUNCTIONAL_ASSESSMENT: 0-10
PAIN_FUNCTIONAL_ASSESSMENT: 0-10

## 2025-03-17 NOTE — NURSING NOTE
Patient pulled out IV x2.  NP consulted and at this time approved IV to stay out.  Patient not on IV medication/fluids, not on Tele, has PO pain medication- denying pain at this time.

## 2025-03-17 NOTE — CONSULTS
Nutrition Note:   Reason for Assessment: Provider consult order    Patient is a 89 y.o. female presenting with fall. Consult placed by provider for nutrition assessment. Chart reviewed - pt's code status is DNR comfort measures only. Pt is on most liberalized diet. Meal intakes documented in EMR have been 25-33% x 4 meals. Will order Mighty Shake TID (220 kcal and 6 g protein per serving) due to low BMI and poor meal intakes. Not appropriate for aggressive nutrition interventions such as enteral/parenteral nutrition. Full nutrition assessment not warranted at this time. Please reconsult for changes in plan of care/code status or need for RD.      Time Spent (min): 5 minutes

## 2025-03-17 NOTE — PROGRESS NOTES
"Julissa Plasencia is a 89 y.o. female on day 3 of admission presenting with Fall, initial encounter.      Subjective   No events overnight.  Patient seen and examined at bedside.  She is alert and oriented to name only. She states \"I have no teeth.  I'd like to have some teeth.\"       Objective     Last Recorded Vitals  BP (!) 182/79 (Patient Position: Lying)   Pulse 93   Temp 37.7 °C (99.9 °F)   Resp 18   Wt 45.4 kg (100 lb)   SpO2 96%   Intake/Output last 3 Shifts:    Intake/Output Summary (Last 24 hours) at 3/17/2025 1950  Last data filed at 3/17/2025 1804  Gross per 24 hour   Intake 25 ml   Output 450 ml   Net -425 ml       Admission Weight  Weight: 45.4 kg (100 lb) (03/14/25 1018)    Daily Weight  03/14/25 : 45.4 kg (100 lb)    Image Results  ECG 12 lead  Atrial fibrillation  Right bundle branch block      Physical Exam  Constitutional:       Comments: thin   HENT:      Head: Normocephalic.      Nose: Nose normal.      Mouth/Throat:      Mouth: Mucous membranes are moist.   Eyes:      Conjunctiva/sclera: Conjunctivae normal.   Cardiovascular:      Rate and Rhythm: Normal rate. Rhythm irregular.      Heart sounds: Normal heart sounds.   Pulmonary:      Effort: Pulmonary effort is normal.      Breath sounds: Rales present.   Abdominal:      General: Bowel sounds are normal.      Palpations: Abdomen is soft.      Tenderness: There is abdominal tenderness.   Musculoskeletal:      Cervical back: Neck supple.      Comments: Generalized weakness right lower extremity shortened and slightly internally rotated   Skin:     General: Skin is warm and dry.   Neurological:      Mental Status: She is alert. Mental status is at baseline.   Psychiatric:         Mood and Affect: Mood normal.      Relevant Results               Assessment/Plan                  Assessment & Plan  Fall, initial encounter    Closed intertrochanteric fracture of right femur, initial encounter    New onset a-fib (Multi)    Leukocytosis    Prolonged Q-T " interval on ECG    Protein calorie malnutrition (Multi)    Osteoarthritis of both hips    Anxiety    Dementia    Chronic headaches    Urinary tract infection    Elevated liver function tests    Liver disease    Dysfunctional uterine bleeding    Anemia    Admit to tele  Appreciate ortho recs  NPO after mn  Morphine for breakthrough pain  Bedrest  Check covid/flu  Am CBC, BMP  Add mag  Leukocytosis may be a result of trauma, will hold antibiotics at this time.  Dietician consult     New Afib     Appreciate cardio recs  Check trops  Echo  Continue home lopressor  [id number] 4, however with surgery likely and frequent fall will defer to cardiology for possible AC     Prolonged QT  Monitor on tele     Chronic conditions: Hypertension, headaches  Continue home medications as listed above     DVT prophylaxis  SCDs  Heparin, hold am dose    3/16: Patient is status post IM nail of right femur fracture.  She can be weightbearing as tolerated per orthopedics.  PT OT eval pending.    3/17: Leukocytosis improved from 15 down to 13. No fever. Suspect reactive leukocytosis. Hemoglobin stable at 8.1. PT OT eval pending.               Andrew Alamo DO

## 2025-03-17 NOTE — CARE PLAN
The patient's goals for the shift include sleep    The clinical goals for the shift include safety      Problem: Pain - Adult  Goal: Verbalizes/displays adequate comfort level or baseline comfort level  Outcome: Progressing     Problem: Safety - Adult  Goal: Free from fall injury  Outcome: Progressing     Problem: Discharge Planning  Goal: Discharge to home or other facility with appropriate resources  Outcome: Progressing     Problem: Chronic Conditions and Co-morbidities  Goal: Patient's chronic conditions and co-morbidity symptoms are monitored and maintained or improved  Outcome: Progressing     Problem: Nutrition  Goal: Nutrient intake appropriate for maintaining nutritional needs  Outcome: Progressing     Problem: Skin  Goal: Decreased wound size/increased tissue granulation at next dressing change  Outcome: Progressing  Flowsheets (Taken 3/17/2025 1230)  Decreased wound size/increased tissue granulation at next dressing change: Promote sleep for wound healing  Goal: Participates in plan/prevention/treatment measures  Outcome: Progressing  Flowsheets (Taken 3/17/2025 1230)  Participates in plan/prevention/treatment measures: Elevate heels  Goal: Prevent/manage excess moisture  Outcome: Progressing  Flowsheets (Taken 3/17/2025 1230)  Prevent/manage excess moisture: Cleanse incontinence/protect with barrier cream  Goal: Prevent/minimize sheer/friction injuries  Outcome: Progressing  Flowsheets (Taken 3/17/2025 1230)  Prevent/minimize sheer/friction injuries: Use pull sheet  Goal: Promote/optimize nutrition  Outcome: Progressing  Flowsheets (Taken 3/17/2025 1230)  Promote/optimize nutrition:   Consume > 50% meals/supplements   Offer water/supplements/favorite foods  Goal: Promote skin healing  Outcome: Progressing  Flowsheets (Taken 3/17/2025 1230)  Promote skin healing: Assess skin/pad under line(s)/device(s)

## 2025-03-17 NOTE — PROGRESS NOTES
"Julissa Plasencia is a 89 y.o. female on day 3 of admission presenting with Fall, initial encounter.    Subjective   Patient is postoperative day 2 following a right hip intramedullary placement.  The patient does have a history of dementia and is confused this morning.       Objective     Physical Exam  Patient is confused in bed.  She is in no acute distress.  Her right hip bandages do have moderate bloody shadow.  Her right thigh compartments are soft.  She was voluntarily flexing and extending her right knee.  Her capillary fill is less than 2 seconds distally.    Last Recorded Vitals  Blood pressure 127/86, pulse 100, temperature 36.8 °C (98.2 °F), resp. rate 18, height 1.575 m (5' 2\"), weight 45.4 kg (100 lb), SpO2 95%.  Intake/Output last 3 Shifts:  I/O last 3 completed shifts:  In: 792.8 (17.5 mL/kg) [P.O.:457; I.V.:335.8 (7.4 mL/kg)]  Out: 750 (16.5 mL/kg) [Urine:750 (0.5 mL/kg/hr)]  Weight: 45.4 kg     Relevant Results      Scheduled medications  acetaminophen, 650 mg, oral, q6h SHIV  enoxaparin, 30 mg, subcutaneous, q12h SHIV  melatonin, 5 mg, oral, Nightly  metoprolol succinate XL, 50 mg, oral, Daily  perflutren lipid microspheres, 0.5-10 mL of dilution, intravenous, Once in imaging  perflutren protein A microsphere, 0.5 mL, intravenous, Once in imaging  polyethylene glycol, 17 g, oral, Daily  senna, 10 mL, oral, BID  sulfur hexafluoride microsphr, 2 mL, intravenous, Once in imaging      Continuous medications  oxygen, 2 L/min      PRN medications  PRN medications: acetaminophen, morphine, naloxone, ondansetron **OR** ondansetron, oxyCODONE-acetaminophen, oxyCODONE-acetaminophen, oxyCODONE-acetaminophen  Results for orders placed or performed during the hospital encounter of 03/14/25 (from the past 24 hours)   CBC   Result Value Ref Range    WBC 13.0 (H) 4.4 - 11.3 x10*3/uL    nRBC 0.0 0.0 - 0.0 /100 WBCs    RBC 2.70 (L) 4.00 - 5.20 x10*6/uL    Hemoglobin 8.1 (L) 12.0 - 16.0 g/dL    Hematocrit 25.4 (L) 36.0 - " 46.0 %    MCV 94 80 - 100 fL    MCH 30.0 26.0 - 34.0 pg    MCHC 31.9 (L) 32.0 - 36.0 g/dL    RDW 13.7 11.5 - 14.5 %    Platelets 241 150 - 450 x10*3/uL   Basic Metabolic Panel   Result Value Ref Range    Glucose 104 (H) 74 - 99 mg/dL    Sodium 133 (L) 136 - 145 mmol/L    Potassium 4.3 3.5 - 5.3 mmol/L    Chloride 96 (L) 98 - 107 mmol/L    Bicarbonate 30 21 - 32 mmol/L    Anion Gap 11 10 - 20 mmol/L    Urea Nitrogen 17 6 - 23 mg/dL    Creatinine 0.43 (L) 0.50 - 1.05 mg/dL    eGFR 89 >60 mL/min/1.73m*2    Calcium 8.5 (L) 8.6 - 10.3 mg/dL   POCT GLUCOSE   Result Value Ref Range    POCT Glucose 91 74 - 99 mg/dL                            Assessment/Plan   Assessment & Plan  Fall, initial encounter    Closed intertrochanteric fracture of right femur, initial encounter    New onset a-fib (Multi)    Leukocytosis    Prolonged Q-T interval on ECG    Protein calorie malnutrition (Multi)    Osteoarthritis of both hips    Anxiety    Dementia    Chronic headaches    Urinary tract infection    Elevated liver function tests    Liver disease    Dysfunctional uterine bleeding    Anemia    The patient is stable orthopedically following a right hip intervention nail placement.  She is weightbearing as tolerated on her right lower extremity.  She is on Lovenox for DVT prophylaxis.  Her hemoglobin and hematocrit have remained stable.       I spent 15 minutes in the professional and overall care of this patient.      Momo Allan MD

## 2025-03-17 NOTE — PROGRESS NOTES
Physical Therapy    Physical Therapy Evaluation    Patient Name: Julissa Plasencia  MRN: [id number]  Today's Date: 3/17/2025   Time Calculation  Start Time: 1121  Stop Time: 1133  Time Calculation (min): 12 min  729/729-A    Assessment/Plan   PT Assessment  PT Assessment Results: Decreased strength, Impaired balance, Decreased mobility  Rehab Prognosis: Fair  Barriers to Discharge Home: Physical needs  Evaluation/Treatment Tolerance: Patient tolerated treatment well  Medical Staff Made Aware: Yes  Strengths: Attitude of self  Barriers to Participation: Comorbidities  End of Session Communication: Bedside nurse  Assessment Comment: pt tolerated session. pt required assist during functional mobility to maintain safety. pt presented with decreased functional mobility, strength, and balance. pt would benefit from mod int therapy in order to return to PLOF.  End of Session Patient Position: Bed, 2 rail up, Alarm on (call light in reach)  IP OR SWING BED PT PLAN  Inpatient or Swing Bed: Inpatient  PT Plan  Treatment/Interventions: Bed mobility, Transfer training, Gait training, Balance training, Strengthening, Therapeutic exercise, Therapeutic activity  PT Plan: Ongoing PT  PT Frequency: 3 times per week  PT Discharge Recommendations: Moderate intensity level of continued care  PT Recommended Transfer Status: Assist x1  PT - OK to Discharge: Yes (once medically cleared)    Subjective     Current Problem:  1. Fall, initial encounter        2. Closed intertrochanteric fracture of hip, right, initial encounter        3. Closed intertrochanteric fracture of right femur, initial encounter  Case Request Operating Room: INSERTION, INTRAMEDULLARY KRYSTEN, FEMUR    Case Request Operating Room: INSERTION, INTRAMEDULLARY KRYSTEN, FEMUR    Transthoracic Echo (TTE) Complete    Transthoracic Echo (TTE) Complete    CANCELED: Transthoracic Echo (TTE) Complete    CANCELED: Transthoracic Echo (TTE) Complete      4. AF (paroxysmal atrial fibrillation)  (Multi)  Transthoracic Echo (TTE) Complete    Transthoracic Echo (TTE) Complete    CANCELED: Transthoracic Echo (TTE) Complete    CANCELED: Transthoracic Echo (TTE) Complete        Patient Active Problem List   Diagnosis    Hypertension, essential    Confusion    Occlusion and stenosis of left carotid artery    Vitamin D deficiency    Headaches due to old head injury    Weakness    Acute pain of right knee    Fall    Rib pain    Other closed fracture of left pubis, initial encounter    Slow transit constipation    Primary insomnia    Closed fracture of left hip, initial encounter    S/p left hip fracture    Fall, initial encounter    Closed intertrochanteric fracture of right femur, initial encounter    New onset a-fib (Multi)    Leukocytosis    Prolonged Q-T interval on ECG    Protein calorie malnutrition (Multi)    Osteoarthritis of both hips    Anxiety    Dementia    Chronic headaches    Urinary tract infection    Elevated liver function tests    Liver disease    Dysfunctional uterine bleeding    Anemia       General Visit Information:  General  Reason for Referral: PT eval (admitted 3/14 for R hip fx; R ORIF on 3/15.)  Referred By: Jasmeet  Past Medical History Relevant to Rehab: afib, anemia, anxiety, dementia, HTN, headaches, confused, weakness, falls, brain tumor  Co-Treatment: OT  Co-Treatment Reason: to maximize pt safety and mobility  Prior to Session Communication: Bedside nurse  Patient Position Received: Bed, 2 rail up, Alarm off, not on at start of session  General Comment: pt agreeable to therapy    Home Living/Prior Level of Function:  pt from [facility]; poor historian unable to answer subjective questioning about PLOF      Precautions:  Precautions  Precautions Comment: RLE WBAT, strict I and O, milner, impulsive, distractable; falls         Objective     Pain:  Pain Assessment  Pain Assessment: 0-10  0-10 (Numeric) Pain Score: 0 - No pain    Cognition:  Cognition  Orientation Level: Disoriented  to place, Disoriented to time, Disoriented to situation    General Assessments:         Sensation  Light Touch: No apparent deficits  Strength  Strength Comments: BLE strength 3-/5 grossly. BLE ROM WFL for pts age                   Functional Assessments:     Bed Mobility  Bed Mobility: Yes  Bed Mobility 1  Bed Mobility 1: Supine to sitting, Sitting to supine  Bed Mobility Comments 1: completed with maxAx1; max cues for safety and mobility; impulsive and easily distractable; pt attempting to pull out milner catheter and RN aware. unable to progress further mobility d/t pt refusing to stand.  Transfers  Transfer: No  Ambulation/Gait Training  Ambulation/Gait Training Performed: No       Outcome Measures:     [facility] Basic Mobility  Turning from your back to your side while in a flat bed without using bedrails: A little  Moving from lying on your back to sitting on the side of a flat bed without using bedrails: A little  Moving to and from bed to chair (including a wheelchair): A little  Standing up from a chair using your arms (e.g. wheelchair or bedside chair): A lot  To walk in hospital room: A lot  Climbing 3-5 steps with railing: Total  Basic Mobility - Total Score: 14             Goals:  Encounter Problems       Encounter Problems (Active)       PT Problem       STG - Pt will transition supine <> sitting with minAx1 (Progressing)       Start:  03/17/25    Expected End:  03/31/25            STG - Pt will perform a B LE ther ex program of 2-3 sets of 10  (Progressing)       Start:  03/17/25    Expected End:  03/31/25            STG - Pt will transfer STS with SBA (Progressing)       Start:  03/17/25    Expected End:  03/31/25            STG - Pt will amb 100' using w/w with minAx1  (Progressing)       Start:  03/17/25    Expected End:  03/31/25               Pain - Adult            Education Documentation  Precautions, taught by Brenda Worrell, PT at 3/17/2025  1:09 PM.  Learner: Patient  Readiness:  Acceptance  Method: Explanation  Response: needs reinforcement   Comment: pt ed on safety and sequencing with bed mobilty; pt ed on WB precautions    Mobility Training, taught by Brenda Worrell PT at 3/17/2025  1:09 PM.  Learner: Patient  Readiness: Acceptance  Method: Explanation  Response:  needs reinforcement   Comment: pt ed on safety and sequencing with bed mobilty; pt ed on WB precautions    Education Comments  No comments found.

## 2025-03-17 NOTE — PROGRESS NOTES
Occupational Therapy    Occupational Therapy    Evaluation    Patient Name: Julissa Plasencia  MRN: [id number]  Today's Date: 3/17/2025  Time Calculation  Start Time: 1120  Stop Time: 1133  Time Calculation (min): 13 min  729/729-A    Assessment  IP OT Assessment  OT Assessment: Pt. presents with a decline in self-care, mobility, and safety and would benefit from skilled OT services to maximize independence and promote return to prior level of function.  Prognosis: Fair  Barriers to Discharge Home: Caregiver assistance, Physical needs  Evaluation/Treatment Tolerance:  (pt. distracted, impulsive, unsafe needing re-direction.)  Medical Staff Made Aware: Yes  End of Session Communication: Bedside nurse  End of Session Patient Position: Bed, 3 rail up, Alarm on (call light in reach)    Plan:  Treatment Interventions: ADL retraining, Functional transfer training, Endurance training, Cognitive reorientation, Patient/family training, Equipment evaluation/education  OT Frequency: 3 times per week  OT Discharge Recommendations: Moderate intensity level of continued care  OT - OK to Discharge: Yes (once cleared by medical team)    Subjective     Current Problem:  1. Fall, initial encounter        2. Closed intertrochanteric fracture of hip, right, initial encounter        3. Closed intertrochanteric fracture of right femur, initial encounter  Case Request Operating Room: INSERTION, INTRAMEDULLARY KRYSTEN, FEMUR    Case Request Operating Room: INSERTION, INTRAMEDULLARY KRYSTEN, FEMUR    Transthoracic Echo (TTE) Complete    Transthoracic Echo (TTE) Complete    CANCELED: Transthoracic Echo (TTE) Complete    CANCELED: Transthoracic Echo (TTE) Complete      4. AF (paroxysmal atrial fibrillation) (Multi)  Transthoracic Echo (TTE) Complete    Transthoracic Echo (TTE) Complete    CANCELED: Transthoracic Echo (TTE) Complete    CANCELED: Transthoracic Echo (TTE) Complete          General:  General  Reason for Referral: OT eval and treat for  adls  Referred By: Dr. Momo Allan  Past Medical History Relevant to Rehab: Pt. admitted after fall suffering R hip fx. s/p R ORIF on 3/15/25. PMXH: afib, anemia, anxiety, dementia, htn, confusion, weakness, falls, brain tumor, L hip fx/ORIF  Family/Caregiver Present: No  Co-Treatment: PT  Co-Treatment Reason: to maximize pt. safety and mobility  Prior to Session Communication: Bedside nurse  Patient Position Received: Bed, 3 rail up, Alarm off, not on at start of session  General Comment: pt. confused, distractible, impulsive, not wanting to stand, pulling on milner.    Precautions:  LE Weight Bearing Status: Weight Bearing as Tolerated  Medical Precautions: Fall precautions  Precautions Comment: strict I&O      Pain:  Pain Assessment  Pain Assessment: 0-10  0-10 (Numeric) Pain Score:  (pt. expressing pain in R hip when moving in bed. not rated)    Objective     Cognition:  Overall Cognitive Status: Impaired  Orientation Level:  (pt. oriented to name only. confused)  Attention:  (impaired, distracted requiring redirection to focus on task)  Problem Solving:  (impaired)  Safety/Judgement:  (impaired)  Insight:  (impaired)  Impulsive: Moderately  Processing Speed: Delayed     Home Living:  Type of Home: Assisted living ([facility])  Home Layout: One level  Home Access: No concerns     Prior Function:  Level of [place]: Needs assistance with ADLs  Prior Function Comments: pt. uses rolling walker or WC per EMR. Pt. unable to provide prior functional status.    IADL History:  Homemaking Responsibilities: No    ADL:  Bathing Assistance: Maximal  LE Dressing Assistance: Total  Toileting Assistance with Device: Total    Activity Tolerance:  Endurance: Decreased tolerance for upright activites    Bed Mobility/Transfers:   Bed Mobility  Bed Mobility:  (max. assist of 1 with max. cues for supine<>sit to eob)  Transfers  Transfer:  (unable to take any steps)    Ambulation/Gait Training:  Functional Mobility  Functional  Mobility Performed:  (unable to tolerate any steps with rolling walker)    Strength:  Strength Comments: bilat.  grossly wfl    Hand Function:  Hand Function  Gross Grasp: Functional    Extremities:  RUE :  (grossly observed to be wfl)   LUE:  (grossly observed to be wfl)    Outcome Measures: [facility] Daily Activity  Putting on and taking off regular lower body clothing: Total  Bathing (including washing, rinsing, drying): A lot  Putting on and taking off regular upper body clothing: A lot  Toileting, which includes using toilet, bedpan or urinal: Total  Taking care of personal grooming such as brushing teeth: A lot  Eating Meals: A lot  Daily Activity - Total Score: 10     EDUCATION:     Education Documentation  ADL Training, taught by Mey Hernandez, OT at 3/17/2025  3:20 PM.  Learner: Patient  Readiness: Acceptance  Method: Explanation  Response: Needs Reinforcement    Education Comments  No comments found.        Goals:   Encounter Problems       Encounter Problems (Active)       OT Goals       Pt. will feed self with sba after set-up using adaptive equipment as needed.  (Progressing)       Start:  03/17/25    Expected End:  03/31/25            Pt. will perform grooming with min. assist after set-up using adaptive equipment as needed.  (Progressing)       Start:  03/17/25    Expected End:  03/31/25            Pt. will perform dressing with mod. assist using adaptive equipment as needed.  (Progressing)       Start:  03/17/25    Expected End:  03/31/25            Patient will be oriented x 3 with use of compensatory techniques as needed and demonstrate good safety awareness regarding home safety situations.  (Progressing)       Start:  03/17/25    Expected End:  03/31/25            Pt. will perform bed mobility/chair/commode transfers with min. assist.  (Progressing)       Start:  03/17/25    Expected End:  03/31/25

## 2025-03-17 NOTE — CARE PLAN
The patient's goals for the shift include to be left alone    The clinical goals for the shift include remain safe and free from falls      Problem: Pain - Adult  Goal: Verbalizes/displays adequate comfort level or baseline comfort level  Outcome: Progressing     Problem: Safety - Adult  Goal: Free from fall injury  Outcome: Progressing     Problem: Discharge Planning  Goal: Discharge to home or other facility with appropriate resources  Outcome: Progressing     Problem: Chronic Conditions and Co-morbidities  Goal: Patient's chronic conditions and co-morbidity symptoms are monitored and maintained or improved  Outcome: Progressing     Problem: Nutrition  Goal: Nutrient intake appropriate for maintaining nutritional needs  Outcome: Progressing     Problem: Skin  Goal: Decreased wound size/increased tissue granulation at next dressing change  3/17/2025 0010 by Cortney Ramirez RN  Outcome: Progressing  3/17/2025 0009 by Cortney Ramirez RN  Flowsheets (Taken 3/17/2025 0009)  Decreased wound size/increased tissue granulation at next dressing change: Promote sleep for wound healing  Goal: Participates in plan/prevention/treatment measures  3/17/2025 0010 by Cortney Ramirez RN  Outcome: Progressing  3/17/2025 0009 by Cortney Ramirez RN  Flowsheets (Taken 3/17/2025 0009)  Participates in plan/prevention/treatment measures: Elevate heels  Goal: Prevent/manage excess moisture  3/17/2025 0010 by Cortney Ramirez RN  Outcome: Progressing  3/17/2025 0009 by Cortney Ramirez RN  Flowsheets (Taken 3/17/2025 0009)  Prevent/manage excess moisture:   Monitor for/manage infection if present   Cleanse incontinence/protect with barrier cream  Goal: Prevent/minimize sheer/friction injuries  3/17/2025 0010 by Cortney Ramirez RN  Outcome: Progressing  3/17/2025 0009 by Cortney Ramirez RN  Flowsheets (Taken 3/17/2025 0009)  Prevent/minimize sheer/friction injuries: Use pull sheet  Goal: Promote/optimize nutrition  3/17/2025 0010 by Cortney Ramirez  RN  Outcome: Progressing  3/17/2025 0009 by Cortney Ramirez RN  Flowsheets (Taken 3/17/2025 0009)  Promote/optimize nutrition:   Offer water/supplements/favorite foods   Monitor/record intake including meals  Goal: Promote skin healing  3/17/2025 0010 by Cortney Ramirez RN  Outcome: Progressing  3/17/2025 0009 by Cortney Ramirez RN  Flowsheets (Taken 3/17/2025 0009)  Promote skin healing:   Assess skin/pad under line(s)/device(s)   Protective dressings over bony prominences   Rotate device position/do not position patient on device

## 2025-03-17 NOTE — PROGRESS NOTES
Julissa Plasencia is a 89 y.o. female on day 3 of admission presenting with Fall, initial encounter.      Subjective   No events overnight.  Patient seen and examined at bedside.  She has no complaints.       Objective     Last Recorded Vitals  /60   Pulse 82   Temp 36.5 °C (97.7 °F)   Resp 18   Wt 45.4 kg (100 lb)   SpO2 98%   Intake/Output last 3 Shifts:    Intake/Output Summary (Last 24 hours) at 3/17/2025 0029  Last data filed at 3/16/2025 1300  Gross per 24 hour   Intake 457 ml   Output 500 ml   Net -43 ml       Admission Weight  Weight: 45.4 kg (100 lb) (03/14/25 1018)    Daily Weight  03/14/25 : 45.4 kg (100 lb)    Image Results  XR hip right with pelvis when performed 1 view  Narrative: Interpreted By:  Freddy Freeman,   STUDY:  XR HIP RIGHT WITH PELVIS WHEN PERFORMED 1 VIEW; ;  3/15/2025 10:04 am      INDICATION:  Signs/Symptoms:Placement of right hip intramedullary nail.          COMPARISON:  None.      ACCESSION NUMBER(S):  [id number]      ORDERING CLINICIAN:  SOLO BRADLEY      FINDINGS:  A minimum of 2 orthogonal views are necessary to completely exclude a  fracture.      Status post right hip nail and m kathy transfixing the proximal femoral  fracture. Alignment is grossly normal on this single view. Soft  tissue air and surgical clips from recent surgery.      Generalized osteopenia.      Impression: Status post surgery without complicating features.          MACRO:  None      Signed by: Freddy Freeman 3/15/2025 10:37 AM  Dictation workstation:   [id number]  FL less than 1 hour  These images are not reportable by radiology and will not be interpreted   by  Radiologists.      Physical Exam  Constitutional:       Comments: thin   HENT:      Head: Normocephalic.      Nose: Nose normal.      Mouth/Throat:      Mouth: Mucous membranes are moist.   Eyes:      Conjunctiva/sclera: Conjunctivae normal.   Cardiovascular:      Rate and Rhythm: Normal rate. Rhythm irregular.      Heart sounds: Normal  heart sounds.   Pulmonary:      Effort: Pulmonary effort is normal.      Breath sounds: Rales present.   Abdominal:      General: Bowel sounds are normal.      Palpations: Abdomen is soft.      Tenderness: There is abdominal tenderness.   Musculoskeletal:      Cervical back: Neck supple.      Comments: Generalized weakness right lower extremity shortened and slightly internally rotated   Skin:     General: Skin is warm and dry.   Neurological:      Mental Status: She is alert. Mental status is at baseline.   Psychiatric:         Mood and Affect: Mood normal.      Relevant Results               Assessment/Plan                  Assessment & Plan  Fall, initial encounter    Closed intertrochanteric fracture of right femur, initial encounter    New onset a-fib (Multi)    Leukocytosis    Prolonged Q-T interval on ECG    Protein calorie malnutrition (Multi)    Osteoarthritis of both hips    Anxiety    Dementia    Chronic headaches    Urinary tract infection    Elevated liver function tests    Liver disease    Dysfunctional uterine bleeding    Anemia    Admit to tele  Appreciate ortho recs  NPO after mn  Morphine for breakthrough pain  Bedrest  Check covid/flu  Am CBC, BMP  Add mag  Leukocytosis may be a result of trauma, will hold antibiotics at this time.  Dietician consult     New Afib     Appreciate cardio recs  Check trops  Echo  Continue home lopressor  [id number], however with surgery likely and frequent fall will defer to cardiology for possible AC     Prolonged QT  Monitor on tele     Chronic conditions: Hypertension, headaches  Continue home medications as listed above     DVT prophylaxis  SCDs  Heparin, hold am dose    3/16: Patient is status post IM nail of right femur fracture.  She can be weightbearing as tolerated per orthopedics.  PT OT keon pending.              Andrew Alamo DO

## 2025-03-18 LAB
ANION GAP SERPL CALC-SCNC: 12 MMOL/L (ref 10–20)
AORTIC VALVE PEAK VELOCITY: 1.3 M/S
AV PEAK GRADIENT: 7 MMHG
BUN SERPL-MCNC: 14 MG/DL (ref 6–23)
CALCIUM SERPL-MCNC: 8.9 MG/DL (ref 8.6–10.3)
CHLORIDE SERPL-SCNC: 97 MMOL/L (ref 98–107)
CO2 SERPL-SCNC: 28 MMOL/L (ref 21–32)
CREAT SERPL-MCNC: 0.34 MG/DL (ref 0.5–1.05)
EGFRCR SERPLBLD CKD-EPI 2021: >90 ML/MIN/1.73M*2
EJECTION FRACTION APICAL 4 CHAMBER: 69.6
EJECTION FRACTION: 68 %
ERYTHROCYTE [DISTWIDTH] IN BLOOD BY AUTOMATED COUNT: 13.5 % (ref 11.5–14.5)
GLUCOSE SERPL-MCNC: 91 MG/DL (ref 74–99)
HCT VFR BLD AUTO: 29.1 % (ref 36–46)
HGB BLD-MCNC: 9.2 G/DL (ref 12–16)
LEFT ATRIUM VOLUME AREA LENGTH INDEX BSA: 41.2 ML/M2
LEFT VENTRICLE INTERNAL DIMENSION DIASTOLE: 3.01 CM (ref 3.5–6)
MCH RBC QN AUTO: 30.2 PG (ref 26–34)
MCHC RBC AUTO-ENTMCNC: 31.6 G/DL (ref 32–36)
MCV RBC AUTO: 95 FL (ref 80–100)
NRBC BLD-RTO: 0 /100 WBCS (ref 0–0)
PLATELET # BLD AUTO: 289 X10*3/UL (ref 150–450)
POTASSIUM SERPL-SCNC: 3.9 MMOL/L (ref 3.5–5.3)
RBC # BLD AUTO: 3.05 X10*6/UL (ref 4–5.2)
RIGHT VENTRICLE FREE WALL PEAK S': 12.4 CM/S
RIGHT VENTRICLE PEAK SYSTOLIC PRESSURE: 41.4 MMHG
SODIUM SERPL-SCNC: 133 MMOL/L (ref 136–145)
TRICUSPID ANNULAR PLANE SYSTOLIC EXCURSION: 1.8 CM
WBC # BLD AUTO: 10.7 X10*3/UL (ref 4.4–11.3)

## 2025-03-18 PROCEDURE — 85027 COMPLETE CBC AUTOMATED: CPT | Performed by: INTERNAL MEDICINE

## 2025-03-18 PROCEDURE — 2500000001 HC RX 250 WO HCPCS SELF ADMINISTERED DRUGS (ALT 637 FOR MEDICARE OP): Performed by: ORTHOPAEDIC SURGERY

## 2025-03-18 PROCEDURE — 2500000004 HC RX 250 GENERAL PHARMACY W/ HCPCS (ALT 636 FOR OP/ED): Performed by: ORTHOPAEDIC SURGERY

## 2025-03-18 PROCEDURE — 99024 POSTOP FOLLOW-UP VISIT: CPT | Performed by: ORTHOPAEDIC SURGERY

## 2025-03-18 PROCEDURE — 1210000001 HC SEMI-PRIVATE ROOM DAILY

## 2025-03-18 PROCEDURE — 36415 COLL VENOUS BLD VENIPUNCTURE: CPT | Performed by: INTERNAL MEDICINE

## 2025-03-18 PROCEDURE — 80048 BASIC METABOLIC PNL TOTAL CA: CPT | Performed by: INTERNAL MEDICINE

## 2025-03-18 RX ORDER — OXYCODONE AND ACETAMINOPHEN 5; 325 MG/1; MG/1
0.5 TABLET ORAL EVERY 4 HOURS PRN
Status: DISCONTINUED | OUTPATIENT
Start: 2025-03-18 | End: 2025-03-20 | Stop reason: HOSPADM

## 2025-03-18 RX ADMIN — ENOXAPARIN SODIUM 30 MG: 30 INJECTION SUBCUTANEOUS at 21:06

## 2025-03-18 RX ADMIN — METOPROLOL SUCCINATE 50 MG: 50 TABLET, EXTENDED RELEASE ORAL at 09:42

## 2025-03-18 RX ADMIN — ACETAMINOPHEN 650 MG: 325 TABLET, FILM COATED ORAL at 12:33

## 2025-03-18 RX ADMIN — ACETAMINOPHEN 650 MG: 325 TABLET, FILM COATED ORAL at 06:16

## 2025-03-18 RX ADMIN — POLYETHYLENE GLYCOL 3350 17 G: 17 POWDER, FOR SOLUTION ORAL at 09:41

## 2025-03-18 RX ADMIN — SENNOSIDES 10 ML: 8.8 LIQUID ORAL at 09:41

## 2025-03-18 RX ADMIN — Medication 5 MG: at 21:04

## 2025-03-18 RX ADMIN — ACETAMINOPHEN 650 MG: 325 TABLET, FILM COATED ORAL at 21:04

## 2025-03-18 RX ADMIN — SENNOSIDES 10 ML: 8.8 LIQUID ORAL at 21:04

## 2025-03-18 RX ADMIN — OXYCODONE AND ACETAMINOPHEN 1 TABLET: 10; 325 TABLET ORAL at 01:23

## 2025-03-18 RX ADMIN — ENOXAPARIN SODIUM 30 MG: 30 INJECTION SUBCUTANEOUS at 09:41

## 2025-03-18 ASSESSMENT — PAIN DESCRIPTION - LOCATION: LOCATION: HIP

## 2025-03-18 ASSESSMENT — PAIN DESCRIPTION - ORIENTATION: ORIENTATION: RIGHT

## 2025-03-18 ASSESSMENT — PAIN SCALES - GENERAL
PAINLEVEL_OUTOF10: 2
PAINLEVEL_OUTOF10: 8
PAINLEVEL_OUTOF10: 0 - NO PAIN
PAINLEVEL_OUTOF10: 0 - NO PAIN

## 2025-03-18 ASSESSMENT — PAIN SCALES - WONG BAKER
WONGBAKER_NUMERICALRESPONSE: NO HURT
WONGBAKER_NUMERICALRESPONSE: HURTS WHOLE LOT
WONGBAKER_NUMERICALRESPONSE: NO HURT

## 2025-03-18 ASSESSMENT — PAIN - FUNCTIONAL ASSESSMENT: PAIN_FUNCTIONAL_ASSESSMENT: 0-10

## 2025-03-18 NOTE — CARE PLAN
The patient's goals for the shift include sleep    The clinical goals for the shift include safety and comfort      Problem: Pain - Adult  Goal: Verbalizes/displays adequate comfort level or baseline comfort level  Outcome: Progressing     Problem: Safety - Adult  Goal: Free from fall injury  Outcome: Progressing     Problem: Discharge Planning  Goal: Discharge to home or other facility with appropriate resources  Outcome: Progressing     Problem: Chronic Conditions and Co-morbidities  Goal: Patient's chronic conditions and co-morbidity symptoms are monitored and maintained or improved  Outcome: Progressing     Problem: Nutrition  Goal: Nutrient intake appropriate for maintaining nutritional needs  Outcome: Progressing     Problem: Skin  Goal: Decreased wound size/increased tissue granulation at next dressing change  Outcome: Progressing  Flowsheets (Taken 3/18/2025 1108)  Decreased wound size/increased tissue granulation at next dressing change: Promote sleep for wound healing  Goal: Participates in plan/prevention/treatment measures  Outcome: Progressing  Flowsheets (Taken 3/18/2025 1108)  Participates in plan/prevention/treatment measures: Elevate heels  Goal: Prevent/manage excess moisture  Outcome: Progressing  Flowsheets (Taken 3/18/2025 1108)  Prevent/manage excess moisture: Moisturize dry skin  Goal: Prevent/minimize sheer/friction injuries  Outcome: Progressing  Flowsheets (Taken 3/18/2025 1108)  Prevent/minimize sheer/friction injuries: Use pull sheet  Goal: Promote/optimize nutrition  Outcome: Progressing  Flowsheets (Taken 3/18/2025 1108)  Promote/optimize nutrition:   Offer water/supplements/favorite foods   Consume > 50% meals/supplements  Goal: Promote skin healing  Outcome: Progressing  Flowsheets (Taken 3/18/2025 1108)  Promote skin healing: Assess skin/pad under line(s)/device(s)

## 2025-03-18 NOTE — PROGRESS NOTES
Soila Plasencia is a 89 y.o. female on day 4 of admission presenting with Fall, initial encounter.      Subjective   No events overnight.  Patient seen and examined at bedside.  She is lethargic and confused today.       Objective     Last Recorded Vitals  /56 (BP Location: Left arm, Patient Position: Lying)   Pulse 81   Temp 36.1 °C (97 °F) (Temporal)   Resp 18   Wt 45.4 kg (100 lb)   SpO2 94%   Intake/Output last 3 Shifts:    Intake/Output Summary (Last 24 hours) at 3/18/2025 1010  Last data filed at 3/18/2025 0920  Gross per 24 hour   Intake 50 ml   Output 900 ml   Net -850 ml       Admission Weight  Weight: 45.4 kg (100 lb) (03/14/25 1018)    Daily Weight  03/14/25 : 45.4 kg (100 lb)    Image Results  Transthoracic Echo (TTE) [facility], [address] [place] [address]            Tel [phone] and Fax [phone]    TRANSTHORACIC ECHOCARDIOGRAM REPORT       Patient Name:       SOILA PLASENCIA         Reading Physician:    [id number] Earl MD  Study Date:         3/17/2025           Ordering Provider:    Josué EARL  MRN/PID:            [id number]            Fellow:  Accession#:         [id number]        Nurse:  Date of Birth/Age:  3/1/1929 / 89 years Sonographer:          Winnie Horvath RDCS  Gender assigned at  F                   Additional Staff:  Birth:  Height:             157.48 cm           Admit Date:           3/14/2025  Weight:             45.36 kg            Admission Status:     Inpatient -                                                                Routine  BSA / BMI:          1.42 m2 / 18.29     Encounter#:           [id number]                      kg/m2  Blood Pressure:     127/86 mmHg         Department Location:  [facility]    Study Type:     TRANSTHORACIC ECHO (TTE) COMPLETE  Diagnosis/ICD: Unspecified atrial fibrillation-I48.91  Indication:    Fall Post Hip Surgery  CPT Code:      Echo Complete w Full [id number]    Patient History:  Smoker:            Former.  Pertinent History: HTN. Confusion, Weakness.    Study Detail: The following Echo studies were performed: 2D, M-Mode, Doppler and                color flow. Technically challenging study due to patient lying in                supine position and the patient's lack of cooperation.       PHYSICIAN INTERPRETATION:  Left Ventricle: The left ventricular systolic function is normal, with a visually estimated ejection fraction of 65-70%. There are no regional left ventricular wall motion abnormalities. The left ventricular cavity size is normal. There is mildly increased septal and normal posterior left ventricular wall thickness. There is left ventricular concentric remodeling. Left ventricular diastolic filling is indeterminate.  Left Atrium: The left atrium is mildly dilated.  Right Ventricle: The right ventricle is normal in size. There is normal right ventricular global systolic function.  Right Atrium: The right atrium is normal in size.  Aortic Valve: The aortic valve is probably trileaflet. There is moderate aortic valve regurgitation. The peak instantaneous gradient of the aortic valve is 7 mmHg.  Mitral Valve: The mitral valve is mildly thickened. The mitral valve spectral Doppler A-wave is absent, consistent with atrial fibrillation. There is mild to moderate mitral annular calcification. There is mild mitral valve regurgitation.  Tricuspid Valve: The tricuspid valve is structurally normal. There is mild to moderate tricuspid regurgitation. The Doppler estimated RVSP is mildly elevated at 41.4 mmHg.  Pulmonic Valve: The pulmonic valve is structurally normal. There is physiologic pulmonic valve regurgitation.  Pericardium: Trivial pericardial effusion.  Aorta: The aortic root is normal.  In  comparison to the previous echocardiogram(s): There are no prior studies on this patient for comparison purposes.       CONCLUSIONS:   1. The left ventricular systolic function is normal, with a visually estimated ejection fraction of 65-70%.   2. Left ventricular diastolic filling is indeterminate.   3. There is normal right ventricular global systolic function.   4. The left atrium is mildly dilated.   5. Absent A-wave on MV spectral Doppler tracing, consistent with atrial fibrillation.   6. Mild to moderate tricuspid regurgitation visualized.   7. Moderate aortic valve regurgitation.   8. Mildly elevated right ventricular systolic pressure.    QUANTITATIVE DATA SUMMARY:     2D MEASUREMENTS:          Normal Ranges:  Ao Root d:       3.05 cm  (2.0-3.7cm)  LAs:             3.20 cm  (2.7-4.0cm)  IVSd:            1.10 cm  (0.6-1.1cm)  LVPWd:           0.80 cm  (0.6-1.1cm)  LVIDd:           3.01 cm  (3.9-5.9cm)  LVIDs:           1.83 cm  LV Mass Index:   53 g/m2  LVEDV Index:     19 ml/m2  LV % FS          39.2 %       LEFT ATRIUM:                  Normal Ranges:  LA Vol A4C:        67.3 ml    (22+/-6mL/m2)  LA Vol A2C:        43.4 ml  LA Vol BP:         58.6 ml  LA Vol Index A4C:  47.3ml/m2  LA Vol Index A2C:  30.5 ml/m2  LA Vol Index BP:   41.2 ml/m2  LA Area A4C:       20.1 cm2  LA Area A2C:       17.5 cm2  LA Major Axis A4C: 5.1 cm  LA Major Axis A2C: 6.0 cm  LA Volume Index:   40.1 ml/m2  LA Vol A4C:        6.7 ml  LA Vol A2C:        41.8 ml  LA Vol Index BSA:  17.0 ml/m2       RIGHT ATRIUM:         Normal Ranges:  RA Area A4C:  8.3 cm2       LV SYSTOLIC FUNCTION:                       Normal Ranges:  EF-A4C View:    70 % (>=55%)  EF-A2C View:    80 %  EF-Biplane:     75 %  EF-Visual:      68 %  LV EF Reported: 68 %       LV DIASTOLIC FUNCTION:           Normal Ranges:  MV e'                  0.110 m/s (>8.0)  MV lateral e'          0.12 m/s  MV medial e'           0.10 m/s       MITRAL VALVE:          Normal  Ranges:  MV DT:        254 msec (150-240msec)       AORTIC VALVE:          Normal Ranges:  AoV Vmax:     1.30 m/s (<=1.7m/s)  AoV Peak P.8 mmHg (<20mmHg)  LVOT Max [place]: 0.87 m/s (<=1.1m/s)  LVOT VTI:     16.70 cm       AORTIC INSUFFICIENCY:  AI Vmax:       3.76 m/s  AI Half-time:  344 msec  AI Decel Rate: 302.50 cm/s2       RIGHT VENTRICLE:  RV Basal 3.60 cm  RV Mid   2.03 cm  RV Major 5.2 cm  TAPSE:   17.5 mm  RV s'    0.12 m/s       TRICUSPID VALVE/RVSP:          Normal Ranges:  Peak TR Velocity:     3.10 m/s  RV Syst Pressure:     41 mmHg  (< 30mmHg)       [id number] Vimal Bonilla MD  Electronically signed on 3/18/2025 at 6:55:01 AM       ** Final **      Physical Exam  Constitutional:       Comments: thin   HENT:      Head: Normocephalic.      Nose: Nose normal.      Mouth/Throat:      Mouth: Mucous membranes are moist.   Eyes:      Conjunctiva/sclera: Conjunctivae normal.   Cardiovascular:      Rate and Rhythm: Normal rate. Rhythm irregular.      Heart sounds: Normal heart sounds.   Pulmonary:      Effort: Pulmonary effort is normal.      Breath sounds: Rales present.   Abdominal:      General: Bowel sounds are normal.      Palpations: Abdomen is soft.      Tenderness: There is abdominal tenderness.   Musculoskeletal:      Cervical back: Neck supple.      Comments: Generalized weakness right lower extremity shortened and slightly internally rotated   Skin:     General: Skin is warm and dry.   Neurological:      Mental Status: She is alert. Mental status is at baseline.   Psychiatric:         Mood and Affect: Mood normal.      Relevant Results               Assessment/Plan                  Assessment & Plan  Fall, initial encounter    Closed intertrochanteric fracture of right femur, initial encounter    New onset a-fib (Multi)    Leukocytosis    Prolonged Q-T interval on ECG    Protein calorie malnutrition (Multi)    Osteoarthritis of both hips    Anxiety    Dementia    Chronic headaches    Urinary tract  infection    Elevated liver function tests    Liver disease    Dysfunctional uterine bleeding    Anemia    Admit to tele  Appreciate ortho recs  NPO after mn  Morphine for breakthrough pain  Bedrest  Check covid/flu  Am CBC, BMP  Add mag  Leukocytosis may be a result of trauma, will hold antibiotics at this time.  Dietician consult     New Afib     Appreciate cardio recs  Check trops  Echo  Continue home lopressor  [id number], however with surgery likely and frequent fall will defer to cardiology for possible AC     Prolonged QT  Monitor on tele     Chronic conditions: Hypertension, headaches  Continue home medications as listed above     DVT prophylaxis  SCDs  Heparin, hold am dose    3/16: Patient is status post IM nail of right femur fracture.  She can be weightbearing as tolerated per orthopedics.  PT OT eval pending.    3/17: Leukocytosis improved from 15 down to 13. No fever. Suspect reactive leukocytosis. Hemoglobin stable at 8.1. PT OT eval pending.     3/18: Leukocytosis resolved, white blood cell count at 10. Decrease pain medications. Continue melatonin and precautions for delirium. PT/OT eval. Patient may need SNF.            Andrew Alamo DO

## 2025-03-18 NOTE — PROGRESS NOTES
"Julissa Plasencia is a 89 y.o. female on day 4 of admission presenting with Fall, initial encounter.    Subjective   Patient is postoperative day 3 following placement of her right hip intramedullary nail for a intertrochanteric hip fracture.  Patient remains confused.       Objective     Physical Exam  Patient is confused in bed.  She is in no acute distress.  There is bloody shadow on her bandages.  Her thigh compartments are soft.  She was flexing extending her knee without difficulty.    Last Recorded Vitals  Blood pressure 111/56, pulse 81, temperature 36.1 °C (97 °F), temperature source Temporal, resp. rate 18, height 1.575 m (5' 2\"), weight 45.4 kg (100 lb), SpO2 94%.  Intake/Output last 3 Shifts:  I/O last 3 completed shifts:  In: 25 (0.6 mL/kg) [P.O.:25]  Out: 900 (19.8 mL/kg) [Urine:900 (0.6 mL/kg/hr)]  Weight: 45.4 kg     Relevant Results      Scheduled medications  acetaminophen, 650 mg, oral, q6h SHIV  enoxaparin, 30 mg, subcutaneous, q12h SHIV  melatonin, 5 mg, oral, Nightly  metoprolol succinate XL, 50 mg, oral, Daily  perflutren lipid microspheres, 0.5-10 mL of dilution, intravenous, Once in imaging  perflutren protein A microsphere, 0.5 mL, intravenous, Once in imaging  polyethylene glycol, 17 g, oral, Daily  senna, 10 mL, oral, BID  sulfur hexafluoride microsphr, 2 mL, intravenous, Once in imaging      Continuous medications  oxygen, 2 L/min      PRN medications  PRN medications: acetaminophen, morphine, naloxone, ondansetron **OR** ondansetron, oxyCODONE-acetaminophen, oxyCODONE-acetaminophen, oxyCODONE-acetaminophen  Results for orders placed or performed during the hospital encounter of 03/14/25 (from the past 24 hours)   Transthoracic Echo (TTE) Complete   Result Value Ref Range    AV pk vladimir 1.30 m/s    LA vol index A/L 41.2 ml/m2    Tricuspid annular plane systolic excursion 1.8 cm    LV EF 68 %    RV free wall pk S' 12.40 cm/s    LVIDd 3.01 cm    RVSP 41.4 mmHg    AV pk grad 7 mmHg    LV A4C EF 69.6  "   CBC   Result Value Ref Range    WBC 10.7 4.4 - 11.3 x10*3/uL    nRBC 0.0 0.0 - 0.0 /100 WBCs    RBC 3.05 (L) 4.00 - 5.20 x10*6/uL    Hemoglobin 9.2 (L) 12.0 - 16.0 g/dL    Hematocrit 29.1 (L) 36.0 - 46.0 %    MCV 95 80 - 100 fL    MCH 30.2 26.0 - 34.0 pg    MCHC 31.6 (L) 32.0 - 36.0 g/dL    RDW 13.5 11.5 - 14.5 %    Platelets 289 150 - 450 x10*3/uL   Basic Metabolic Panel   Result Value Ref Range    Glucose 91 74 - 99 mg/dL    Sodium 133 (L) 136 - 145 mmol/L    Potassium 3.9 3.5 - 5.3 mmol/L    Chloride 97 (L) 98 - 107 mmol/L    Bicarbonate 28 21 - 32 mmol/L    Anion Gap 12 10 - 20 mmol/L    Urea Nitrogen 14 6 - 23 mg/dL    Creatinine 0.34 (L) 0.50 - 1.05 mg/dL    eGFR >90 >60 mL/min/1.73m*2    Calcium 8.9 8.6 - 10.3 mg/dL               This patient has a urinary catheter   Reason for the urinary catheter remaining today? perioperative use for selected surgical procedures               Assessment/Plan   Assessment & Plan  Fall, initial encounter    Closed intertrochanteric fracture of right femur, initial encounter    New onset a-fib (Multi)    Leukocytosis    Prolonged Q-T interval on ECG    Protein calorie malnutrition (Multi)    Osteoarthritis of both hips    Anxiety    Dementia    Chronic headaches    Urinary tract infection    Elevated liver function tests    Liver disease    Dysfunctional uterine bleeding    Anemia    Patient is stable orthopedically following a right hip intramedullary placement.  She is weightbearing as tolerated on her right leg.  She is on Lovenox for DVT prophylaxis.  She is stable to be discharged from an orthopedic perspective.  AP and lateral right hip x-rays will be obtained in 3 weeks and sent to the orthopedic office.  The patient does not necessarily need to be seen in person for follow-up.       I spent 15 minutes in the professional and overall care of this patient.      Momo Allan MD

## 2025-03-18 NOTE — PROGRESS NOTES
03/18/25 1041   Discharge Planning   Living Arrangements Other (Comment)  ([facility])   Support Systems Children   Assistance Needed ADL's   Type of Residence Assisted living   Do you have animals or pets at home? No   Care Facility Name [facility]   Who is requesting discharge planning? Provider   Home or Post Acute Services In home services   Type of Post Acute Facility Services Assisted living   Type of Home Care Services Home OT;Home PT   Expected Discharge Disposition Home H   Does the patient need discharge transport arranged? Yes   RoundTrip coordination needed? Yes   Has discharge transport been arranged? No   Intensity of Service   Intensity of Service 0-30 min     Spoke with Son Momo on the phone with number provided in the chart. Introduced self and role as care coordinator. Demographics verified. Patient PCP is FREDRICK Alamo. Patient insurance is Medicare and [employer]. Patient needs assistance with ADL's. Patient is using a walker and a w/c. Patient gets PT/OT and her AL [facility]. Son states he spoke with Fiordaliza from [facility] and would like for patient to return at discharge as they have a PT/OT program comparable to SNF. Call placed to Fiordaliza VM left to discuss discharge planning. Care coordinators will follow for discharge planning.

## 2025-03-18 NOTE — CARE PLAN
The patient's goals for the shift include sleep    The clinical goals for the shift include safety and comfort

## 2025-03-18 NOTE — CARE PLAN
The patient's goals for the shift include sleep    The clinical goals for the shift include safety and comfort    Problem: Pain - Adult  Goal: Verbalizes/displays adequate comfort level or baseline comfort level  Outcome: Progressing     Problem: Safety - Adult  Goal: Free from fall injury  Outcome: Progressing     Problem: Discharge Planning  Goal: Discharge to home or other facility with appropriate resources  Outcome: Progressing     Problem: Chronic Conditions and Co-morbidities  Goal: Patient's chronic conditions and co-morbidity symptoms are monitored and maintained or improved  Outcome: Progressing     Problem: Nutrition  Goal: Nutrient intake appropriate for maintaining nutritional needs  Outcome: Progressing     Problem: Skin  Goal: Decreased wound size/increased tissue granulation at next dressing change  Outcome: Progressing  Goal: Participates in plan/prevention/treatment measures  Outcome: Progressing  Goal: Prevent/manage excess moisture  Outcome: Progressing  Goal: Prevent/minimize sheer/friction injuries  Outcome: Progressing  Goal: Promote/optimize nutrition  Outcome: Progressing  Goal: Promote skin healing  Outcome: Progressing

## 2025-03-19 LAB
ANION GAP SERPL CALC-SCNC: 11 MMOL/L (ref 10–20)
ATRIAL RATE: 194 BPM
BUN SERPL-MCNC: 13 MG/DL (ref 6–23)
CALCIUM SERPL-MCNC: 8.5 MG/DL (ref 8.6–10.3)
CHLORIDE SERPL-SCNC: 98 MMOL/L (ref 98–107)
CO2 SERPL-SCNC: 27 MMOL/L (ref 21–32)
CREAT SERPL-MCNC: 0.34 MG/DL (ref 0.5–1.05)
EGFRCR SERPLBLD CKD-EPI 2021: >90 ML/MIN/1.73M*2
ERYTHROCYTE [DISTWIDTH] IN BLOOD BY AUTOMATED COUNT: 13.3 % (ref 11.5–14.5)
GLUCOSE SERPL-MCNC: 100 MG/DL (ref 74–99)
HCT VFR BLD AUTO: 24 % (ref 36–46)
HCT VFR BLD AUTO: 25.9 % (ref 36–46)
HGB BLD-MCNC: 7.9 G/DL (ref 12–16)
HGB BLD-MCNC: 8.3 G/DL (ref 12–16)
MCH RBC QN AUTO: 29.6 PG (ref 26–34)
MCHC RBC AUTO-ENTMCNC: 32.9 G/DL (ref 32–36)
MCV RBC AUTO: 90 FL (ref 80–100)
NRBC BLD-RTO: 0 /100 WBCS (ref 0–0)
PLATELET # BLD AUTO: 312 X10*3/UL (ref 150–450)
POTASSIUM SERPL-SCNC: 4 MMOL/L (ref 3.5–5.3)
PR INTERVAL: 112 MS
Q ONSET: 252 MS
QRS COUNT: 16 BEATS
QRS DURATION: 146 MS
QT INTERVAL: 411 MS
QTC CALCULATION(BAZETT): 533 MS
QTC FREDERICIA: 489 MS
R AXIS: -2 DEGREES
RBC # BLD AUTO: 2.67 X10*6/UL (ref 4–5.2)
SODIUM SERPL-SCNC: 132 MMOL/L (ref 136–145)
T AXIS: 5 DEGREES
T OFFSET: 458 MS
VENTRICULAR RATE: 101 BPM
WBC # BLD AUTO: 8.8 X10*3/UL (ref 4.4–11.3)

## 2025-03-19 PROCEDURE — 2500000004 HC RX 250 GENERAL PHARMACY W/ HCPCS (ALT 636 FOR OP/ED): Performed by: ORTHOPAEDIC SURGERY

## 2025-03-19 PROCEDURE — 97535 SELF CARE MNGMENT TRAINING: CPT | Mod: GP,CQ

## 2025-03-19 PROCEDURE — 36415 COLL VENOUS BLD VENIPUNCTURE: CPT | Performed by: INTERNAL MEDICINE

## 2025-03-19 PROCEDURE — 80048 BASIC METABOLIC PNL TOTAL CA: CPT | Performed by: INTERNAL MEDICINE

## 2025-03-19 PROCEDURE — 2500000001 HC RX 250 WO HCPCS SELF ADMINISTERED DRUGS (ALT 637 FOR MEDICARE OP): Performed by: INTERNAL MEDICINE

## 2025-03-19 PROCEDURE — 97535 SELF CARE MNGMENT TRAINING: CPT | Mod: CO,GO

## 2025-03-19 PROCEDURE — 85027 COMPLETE CBC AUTOMATED: CPT | Performed by: INTERNAL MEDICINE

## 2025-03-19 PROCEDURE — 2500000001 HC RX 250 WO HCPCS SELF ADMINISTERED DRUGS (ALT 637 FOR MEDICARE OP): Performed by: ORTHOPAEDIC SURGERY

## 2025-03-19 PROCEDURE — 1210000001 HC SEMI-PRIVATE ROOM DAILY

## 2025-03-19 PROCEDURE — 85014 HEMATOCRIT: CPT | Performed by: INTERNAL MEDICINE

## 2025-03-19 RX ORDER — SENNOSIDES 8.6 MG/1
2 TABLET ORAL 2 TIMES DAILY
Qty: 48 TABLET | Refills: 0 | Status: SHIPPED | OUTPATIENT
Start: 2025-03-19

## 2025-03-19 RX ORDER — ACETAMINOPHEN 325 MG/1
650 TABLET ORAL EVERY 6 HOURS
Status: DISCONTINUED | OUTPATIENT
Start: 2025-03-19 | End: 2025-03-20 | Stop reason: HOSPADM

## 2025-03-19 RX ADMIN — METOPROLOL SUCCINATE 50 MG: 50 TABLET, EXTENDED RELEASE ORAL at 08:03

## 2025-03-19 RX ADMIN — Medication 5 MG: at 21:26

## 2025-03-19 RX ADMIN — OXYCODONE HYDROCHLORIDE AND ACETAMINOPHEN 0.5 TABLET: 5; 325 TABLET ORAL at 06:23

## 2025-03-19 RX ADMIN — ACETAMINOPHEN 650 MG: 325 TABLET, FILM COATED ORAL at 21:26

## 2025-03-19 RX ADMIN — SENNOSIDES 10 ML: 8.8 LIQUID ORAL at 08:03

## 2025-03-19 RX ADMIN — SENNOSIDES 10 ML: 8.8 LIQUID ORAL at 21:26

## 2025-03-19 RX ADMIN — ACETAMINOPHEN 650 MG: 325 TABLET, FILM COATED ORAL at 08:03

## 2025-03-19 RX ADMIN — ENOXAPARIN SODIUM 30 MG: 30 INJECTION SUBCUTANEOUS at 21:26

## 2025-03-19 RX ADMIN — ACETAMINOPHEN 650 MG: 325 TABLET, FILM COATED ORAL at 15:34

## 2025-03-19 RX ADMIN — ENOXAPARIN SODIUM 30 MG: 30 INJECTION SUBCUTANEOUS at 08:03

## 2025-03-19 RX ADMIN — POLYETHYLENE GLYCOL 3350 17 G: 17 POWDER, FOR SOLUTION ORAL at 08:03

## 2025-03-19 ASSESSMENT — PAIN - FUNCTIONAL ASSESSMENT
PAIN_FUNCTIONAL_ASSESSMENT: 0-10
PAIN_FUNCTIONAL_ASSESSMENT: 0-10

## 2025-03-19 ASSESSMENT — COGNITIVE AND FUNCTIONAL STATUS - GENERAL
TURNING FROM BACK TO SIDE WHILE IN FLAT BAD: A LOT
TOILETING: TOTAL
EATING MEALS: A LITTLE
DRESSING REGULAR UPPER BODY CLOTHING: A LOT
DAILY ACTIVITIY SCORE: 12
TURNING FROM BACK TO SIDE WHILE IN FLAT BAD: A LOT
CLIMB 3 TO 5 STEPS WITH RAILING: TOTAL
HELP NEEDED FOR BATHING: A LOT
PERSONAL GROOMING: A LITTLE
DRESSING REGULAR UPPER BODY CLOTHING: A LOT
WALKING IN HOSPITAL ROOM: TOTAL
DRESSING REGULAR LOWER BODY CLOTHING: A LOT
MOVING TO AND FROM BED TO CHAIR: A LOT
STANDING UP FROM CHAIR USING ARMS: A LOT
TOILETING: A LOT
DAILY ACTIVITIY SCORE: 12
MOVING TO AND FROM BED TO CHAIR: A LOT
STANDING UP FROM CHAIR USING ARMS: A LOT
MOBILITY SCORE: 10
MOBILITY SCORE: 10
PERSONAL GROOMING: A LOT
CLIMB 3 TO 5 STEPS WITH RAILING: TOTAL
MOVING FROM LYING ON BACK TO SITTING ON SIDE OF FLAT BED WITH BEDRAILS: A LOT
DRESSING REGULAR LOWER BODY CLOTHING: TOTAL
MOVING FROM LYING ON BACK TO SITTING ON SIDE OF FLAT BED WITH BEDRAILS: A LOT
HELP NEEDED FOR BATHING: A LOT
WALKING IN HOSPITAL ROOM: TOTAL
EATING MEALS: A LOT

## 2025-03-19 ASSESSMENT — PAIN SCALES - GENERAL
PAINLEVEL_OUTOF10: 0 - NO PAIN
PAINLEVEL_OUTOF10: 8
PAINLEVEL_OUTOF10: 0 - NO PAIN

## 2025-03-19 ASSESSMENT — ACTIVITIES OF DAILY LIVING (ADL): HOME_MANAGEMENT_TIME_ENTRY: 15

## 2025-03-19 ASSESSMENT — PAIN DESCRIPTION - LOCATION: LOCATION: HIP

## 2025-03-19 ASSESSMENT — PAIN SCALES - WONG BAKER: WONGBAKER_NUMERICALRESPONSE: HURTS WHOLE LOT

## 2025-03-19 ASSESSMENT — PAIN DESCRIPTION - ORIENTATION: ORIENTATION: RIGHT

## 2025-03-19 NOTE — CARE PLAN
The patient's goals for the shift include sleep    The clinical goals for the shift include comfort and safety      Problem: Pain - Adult  Goal: Verbalizes/displays adequate comfort level or baseline comfort level  Outcome: Progressing     Problem: Safety - Adult  Goal: Free from fall injury  Outcome: Progressing     Problem: Discharge Planning  Goal: Discharge to home or other facility with appropriate resources  Outcome: Progressing     Problem: Chronic Conditions and Co-morbidities  Goal: Patient's chronic conditions and co-morbidity symptoms are monitored and maintained or improved  Outcome: Progressing     Problem: Nutrition  Goal: Nutrient intake appropriate for maintaining nutritional needs  Outcome: Progressing     Problem: Skin  Goal: Decreased wound size/increased tissue granulation at next dressing change  Outcome: Progressing  Goal: Participates in plan/prevention/treatment measures  Outcome: Progressing  Goal: Prevent/manage excess moisture  Outcome: Progressing  Goal: Prevent/minimize sheer/friction injuries  Outcome: Progressing  Goal: Promote/optimize nutrition  Outcome: Progressing  Goal: Promote skin healing  Outcome: Progressing

## 2025-03-19 NOTE — PROGRESS NOTES
Physical Therapy    Physical Therapy Treatment    Patient Name: Julissa Plasencia  MRN: [id number]  Department: [facility]  [facility]  Today's Date: 3/19/2025  Time Calculation  Start Time: 1024  Stop Time: 1048  Time Calculation (min): 24 min       Assessment/Plan   PT Assessment  End of Session Communication: PCT/NA/CTA  End of Session Patient Position: Bed, 3 rail up, Alarm on (bed alarm not working, posey activated)     PT Plan  Treatment/Interventions: Bed mobility, Transfer training, Gait training, Balance training, Strengthening, Therapeutic exercise, Therapeutic activity  PT Plan: Ongoing PT  PT Frequency: 3 times per week  PT Discharge Recommendations: Moderate intensity level of continued care  PT Recommended Transfer Status: Assist x1  PT - OK to Discharge: Yes (once medically cleared)      General Visit Information:   PT  Visit  PT Received On: 03/19/25  General  Co-Treatment: OT  Co-Treatment Reason: to maximize pt safety and mobility  Prior to Session Communication: Bedside nurse  Patient Position Received: Bed, 3 rail up, Alarm on (bed alarm not working, posey activated)  General Comment:  (pt agreeable to participate in therapy; remains confused, distractible, & impulsive)    Subjective   Precautions:  Precautions  LE Weight Bearing Status:  (RLE WBAT)  Medical Precautions: Fall precautions  Precautions Comment: milner catheter        Objective   Pain:  Pain Assessment  Pain Assessment:  (pt appears to have min R hip pain with movement though unable to provide any number rating)     Treatments:  Therapeutic Exercise  Therapeutic Exercise Performed:  (pt not following any directions to complete ther ex at this time)    Bed Mobility  Bed Mobility:  (sup <> sit with mod A x 1.  pt requires varying assist from CGA up to mod A d/t impulsivity and leaning/reaching in different directions at times)    Ambulation/Gait Training  Ambulation/Gait Training Performed:  (pt able to maintain up to 1 min. static stance  followed by sidestepping L along EOB; seated rest break btwn.  mod A x 2 via [facility].  cuing for sequencing throughout.)    Transfers  Transfer:  (sit <> stand x2 trials with max A x 2 via [facility])    Outcome Measures:  [facility] Basic Mobility  Turning from your back to your side while in a flat bed without using bedrails: A lot  Moving from lying on your back to sitting on the side of a flat bed without using bedrails: A lot  Moving to and from bed to chair (including a wheelchair): A lot  Standing up from a chair using your arms (e.g. wheelchair or bedside chair): A lot  To walk in hospital room: Total  Climbing 3-5 steps with railing: Total  Basic Mobility - Total Score: 10    Education Documentation  Precautions, taught by Zeny Desouza PTA at 3/19/2025  2:29 PM.  Learner: Patient  Readiness: Acceptance  Method: Explanation  Response: No Evidence of Learning, Needs Reinforcement    Mobility Training, taught by Zeny Desouza PTA at 3/19/2025  2:29 PM.  Learner: Patient  Readiness: Acceptance  Method: Explanation  Response: No Evidence of Learning, Needs Reinforcement    Education Comments  No comments found.        EDUCATION:       Encounter Problems       Encounter Problems (Active)       PT Problem       STG - Pt will transition supine <> sitting with minAx1 (Progressing)       Start:  03/17/25    Expected End:  03/31/25            STG - Pt will perform a B LE ther ex program of 2-3 sets of 10  (Progressing)       Start:  03/17/25    Expected End:  03/31/25            STG - Pt will transfer STS with SBA (Progressing)       Start:  03/17/25    Expected End:  03/31/25            STG - Pt will amb 100' using w/w with minAx1  (Progressing)       Start:  03/17/25    Expected End:  03/31/25

## 2025-03-19 NOTE — PROGRESS NOTES
Met with Fiordaliza from [facility]. They assessed patient and are comfortable with her returning with the PT program that they have in place. Family (son) and attending aware.

## 2025-03-19 NOTE — CARE PLAN
The patient's goals for the shift include sleep    The clinical goals for the shift include comfort and safety

## 2025-03-19 NOTE — PROGRESS NOTES
Julissa Plasencia is a 89 y.o. female on day 5 of admission presenting with Fall, initial encounter.      Subjective   No events overnight.  Patient seen and examined at bedside.  She is back to baseline.       Objective     Last Recorded Vitals  /67 (Patient Position: Lying)   Pulse 87   Temp 36.8 °C (98.2 °F)   Resp 18   Wt 45.4 kg (100 lb)   SpO2 96%   Intake/Output last 3 Shifts:    Intake/Output Summary (Last 24 hours) at 3/19/2025 1851  Last data filed at 3/19/2025 0602  Gross per 24 hour   Intake --   Output 700 ml   Net -700 ml       Admission Weight  Weight: 45.4 kg (100 lb) (03/14/25 1018)    Daily Weight  03/14/25 : 45.4 kg (100 lb)    Image Results  ECG 12 lead  Atrial fibrillation  Right bundle branch block    See ED provider note for full interpretation and clinical correlation  Confirmed by Nydia Huynh ([id number]) on 3/19/2025 11:11:40 AM      Physical Exam  Constitutional:       Comments: thin   HENT:      Head: Normocephalic.      Nose: Nose normal.      Mouth/Throat:      Mouth: Mucous membranes are moist.   Eyes:      Conjunctiva/sclera: Conjunctivae normal.   Cardiovascular:      Rate and Rhythm: Normal rate. Rhythm irregular.      Heart sounds: Normal heart sounds.   Pulmonary:      Effort: Pulmonary effort is normal.      Breath sounds: Rales present.   Abdominal:      General: Bowel sounds are normal.      Palpations: Abdomen is soft.      Tenderness: There is abdominal tenderness.   Musculoskeletal:      Cervical back: Neck supple.      Comments: Generalized weakness right lower extremity shortened and slightly internally rotated   Skin:     General: Skin is warm and dry.   Neurological:      Mental Status: She is alert. Mental status is at baseline.   Psychiatric:         Mood and Affect: Mood normal.      Relevant Results               Assessment/Plan                  Assessment & Plan  Fall, initial encounter    Closed intertrochanteric fracture of right femur, initial  encounter    New onset a-fib (Multi)    Leukocytosis    Prolonged Q-T interval on ECG    Protein calorie malnutrition (Multi)    Osteoarthritis of both hips    Anxiety    Dementia    Chronic headaches    Urinary tract infection    Elevated liver function tests    Liver disease    Dysfunctional uterine bleeding    Anemia    Admit to tele  Appreciate ortho recs  NPO after mn  Morphine for breakthrough pain  Bedrest  Check covid/flu  Am CBC, BMP  Add mag  Leukocytosis may be a result of trauma, will hold antibiotics at this time.  Dietician consult     New Afib     Appreciate cardio recs  Check trops  Echo  Continue home lopressor  [id number], however with surgery likely and frequent fall will defer to cardiology for possible AC     Prolonged QT  Monitor on tele     Chronic conditions: Hypertension, headaches  Continue home medications as listed above     DVT prophylaxis  SCDs  Heparin, hold am dose    3/16: Patient is status post IM nail of right femur fracture.  She can be weightbearing as tolerated per orthopedics.  PT OT eval pending.    3/17: Leukocytosis improved from 15 down to 13. No fever. Suspect reactive leukocytosis. Hemoglobin stable at 8.1. PT OT eval pending.     3/18: Leukocytosis resolved, white blood cell count at 10. Decrease pain medications. Continue melatonin and precautions for delirium. PT/OT eval. Patient may need SNF.    3/19: Anemia noted, H/H stable though. Patient is medically ready for discharge, orders placed. She will return to her AL tomorrow.              Andrew Alamo DO

## 2025-03-19 NOTE — PROGRESS NOTES
Occupational Therapy    OT Treatment    Patient Name: Julissa Plasencia  MRN: [id number]  Department: [facility]  Room: [facility]72-  Today's Date: 3/19/2025  Time Calculation  Start Time: 1024  Stop Time: 1048  Time Calculation (min): 24 min        Assessment:  End of Session Communication: PCT/NA/CTA  End of Session Patient Position: Bed, 3 rail up, Alarm on     Plan:  Treatment Interventions: ADL retraining, Functional transfer training, Endurance training, Cognitive reorientation, Patient/family training, Equipment evaluation/education  OT Frequency: 3 times per week  OT Discharge Recommendations: Moderate intensity level of continued care  OT - OK to Discharge: Yes (once cleared by medical team)  Treatment Interventions: ADL retraining, Functional transfer training, Endurance training, Cognitive reorientation, Patient/family training, Equipment evaluation/education    Subjective   Previous Visit Info:  OT Last Visit  OT Received On: 03/19/25  General:  General  Co-Treatment: PT  Co-Treatment Reason: to maximize pt safety and mobility  Prior to Session Communication: Bedside nurse  Patient Position Received: Bed, 3 rail up, Alarm on  General Comment: pt agreeable to participate in therapy; remains confused, distractible, & impulsive  Precautions:  LE Weight Bearing Status: Weight Bearing as Tolerated  Medical Precautions: Fall precautions  Precautions Comment: milner catheter            Pain:  Pain Assessment  Pain Assessment:  (min c/o pain in R LE but unable to rate)    Objective    Cognition:  Cognition  Orientation Level: Disoriented to place, Disoriented to time, Disoriented to situation       Activities of Daily Living: UE Dressing  UE Dressing Level of Assistance: Moderate assistance  UE Dressing Where Assessed: Edge of bed  UE Dressing Comments: difficulty following cues to complete tasks    LE Dressing  LE Dressing: Yes  Adult Briefs Level of Assistance: Maximum assistance  LE Dressing Where Assessed: Edge of  bed  Functional Standing Tolerance:  Time: 1:00 standing with use of bilateral hand held assist  Bed Mobility/Transfers: Bed Mobility  Bed Mobility: Yes  Bed Mobility 1  Bed Mobility 1: Supine to sitting, Sitting to supine  Level of Assistance 1: Moderate assistance    Transfers  Transfer: Yes  Transfer 1  Technique 1: Sit to stand, Stand to sit  Transfer Level of Assistance 1: Maximum assistance, +2 (hand held assistance d/t confusion)  Trials/Comments 1: pt completed STS x2 trials      Functional Mobility:  Functional Mobility  Functional Mobility Performed: Yes  Functional Mobility 1  Assistance 1: Moderate assistance (x2)  Comments 1: pt able to take a few side steps along side of bed with bilateral hand held assistance  Sitting Balance:  Static Sitting Balance  Static Sitting-Balance Support: Feet supported (unilateral to bilateral UB support)  Static Sitting-Level of Assistance:  (CGA to Mod A d/t leaning backwards at times)  Static Sitting-Comment/Number of Minutes: pt tolerated sitting EOB for ~15:00      Outcome Measures:[facility] Daily Activity  Putting on and taking off regular lower body clothing: Total  Bathing (including washing, rinsing, drying): A lot  Putting on and taking off regular upper body clothing: A lot  Toileting, which includes using toilet, bedpan or urinal: Total  Taking care of personal grooming such as brushing teeth: A little  Eating Meals: A little  Daily Activity - Total Score: 12        Education Documentation  ADL Training, taught by RIANA Quevedo at 3/19/2025  3:21 PM.  Learner: Patient  Readiness: Acceptance  Method: Explanation  Response: Verbalizes Understanding, Needs Reinforcement    Education Comments  No comments found.          Goals:  Encounter Problems       Encounter Problems (Active)       OT Goals       Pt. will feed self with sba after set-up using adaptive equipment as needed.  (Progressing)       Start:  03/17/25    Expected End:  03/31/25            Pt. will  perform grooming with min. assist after set-up using adaptive equipment as needed.  (Progressing)       Start:  03/17/25    Expected End:  03/31/25            Pt. will perform dressing with mod. assist using adaptive equipment as needed.  (Progressing)       Start:  03/17/25    Expected End:  03/31/25            Patient will be oriented x 3 with use of compensatory techniques as needed and demonstrate good safety awareness regarding home safety situations.  (Progressing)       Start:  03/17/25    Expected End:  03/31/25            Pt. will perform bed mobility/chair/commode transfers with min. assist.  (Progressing)       Start:  03/17/25    Expected End:  03/31/25

## 2025-03-19 NOTE — CARE PLAN
The patient's goals for the shift include safety and comfort  Problem: Pain - Adult  Goal: Verbalizes/displays adequate comfort level or baseline comfort level  Outcome: Progressing     Problem: Safety - Adult  Goal: Free from fall injury  Outcome: Progressing     Problem: Discharge Planning  Goal: Discharge to home or other facility with appropriate resources  Outcome: Progressing     Problem: Chronic Conditions and Co-morbidities  Goal: Patient's chronic conditions and co-morbidity symptoms are monitored and maintained or improved  Outcome: Progressing     Problem: Nutrition  Goal: Nutrient intake appropriate for maintaining nutritional needs  Outcome: Progressing     Problem: Skin  Goal: Decreased wound size/increased tissue granulation at next dressing change  Outcome: Progressing  Flowsheets (Taken 3/18/2025 2100 by Winnie Castle, RN)  Decreased wound size/increased tissue granulation at next dressing change: Promote sleep for wound healing  Goal: Participates in plan/prevention/treatment measures  Outcome: Progressing  Flowsheets (Taken 3/19/2025 1148)  Participates in plan/prevention/treatment measures:   Elevate heels   Discuss with provider PT/OT consult  Goal: Prevent/manage excess moisture  Outcome: Progressing  Flowsheets (Taken 3/19/2025 1148)  Prevent/manage excess moisture:   Moisturize dry skin   Monitor for/manage infection if present  Goal: Prevent/minimize sheer/friction injuries  Outcome: Progressing  Flowsheets (Taken 3/19/2025 1148)  Prevent/minimize sheer/friction injuries:   Use pull sheet   HOB 30 degrees or less  Goal: Promote/optimize nutrition  Outcome: Progressing  Flowsheets (Taken 3/18/2025 2100 by Winnie Castle, BRITTANY)  Promote/optimize nutrition: Offer water/supplements/favorite foods  Goal: Promote skin healing  Outcome: Progressing  Flowsheets (Taken 3/19/2025 1148)  Promote skin healing:   Protective dressings over bony prominences   Assess skin/pad under  line(s)/device(s)       The clinical goals for the shift include Pt will remain safe and comfortable during the shift

## 2025-03-20 VITALS
HEIGHT: 62 IN | DIASTOLIC BLOOD PRESSURE: 83 MMHG | OXYGEN SATURATION: 92 % | TEMPERATURE: 97.2 F | RESPIRATION RATE: 18 BRPM | WEIGHT: 100 LBS | HEART RATE: 88 BPM | BODY MASS INDEX: 18.4 KG/M2 | SYSTOLIC BLOOD PRESSURE: 127 MMHG

## 2025-03-20 LAB
ANION GAP SERPL CALC-SCNC: 13 MMOL/L (ref 10–20)
BUN SERPL-MCNC: 12 MG/DL (ref 6–23)
CALCIUM SERPL-MCNC: 8.7 MG/DL (ref 8.6–10.3)
CHLORIDE SERPL-SCNC: 98 MMOL/L (ref 98–107)
CO2 SERPL-SCNC: 24 MMOL/L (ref 21–32)
CREAT SERPL-MCNC: 0.33 MG/DL (ref 0.5–1.05)
EGFRCR SERPLBLD CKD-EPI 2021: >90 ML/MIN/1.73M*2
ERYTHROCYTE [DISTWIDTH] IN BLOOD BY AUTOMATED COUNT: 13.2 % (ref 11.5–14.5)
GLUCOSE SERPL-MCNC: 103 MG/DL (ref 74–99)
HCT VFR BLD AUTO: 25.9 % (ref 36–46)
HGB BLD-MCNC: 8.3 G/DL (ref 12–16)
MCH RBC QN AUTO: 29.7 PG (ref 26–34)
MCHC RBC AUTO-ENTMCNC: 32 G/DL (ref 32–36)
MCV RBC AUTO: 93 FL (ref 80–100)
NRBC BLD-RTO: 0 /100 WBCS (ref 0–0)
PLATELET # BLD AUTO: 381 X10*3/UL (ref 150–450)
POTASSIUM SERPL-SCNC: 4.2 MMOL/L (ref 3.5–5.3)
RBC # BLD AUTO: 2.79 X10*6/UL (ref 4–5.2)
SODIUM SERPL-SCNC: 131 MMOL/L (ref 136–145)
WBC # BLD AUTO: 10.1 X10*3/UL (ref 4.4–11.3)

## 2025-03-20 PROCEDURE — 2500000001 HC RX 250 WO HCPCS SELF ADMINISTERED DRUGS (ALT 637 FOR MEDICARE OP): Performed by: ORTHOPAEDIC SURGERY

## 2025-03-20 PROCEDURE — 80048 BASIC METABOLIC PNL TOTAL CA: CPT | Performed by: INTERNAL MEDICINE

## 2025-03-20 PROCEDURE — 85027 COMPLETE CBC AUTOMATED: CPT | Performed by: INTERNAL MEDICINE

## 2025-03-20 PROCEDURE — 2500000001 HC RX 250 WO HCPCS SELF ADMINISTERED DRUGS (ALT 637 FOR MEDICARE OP): Performed by: INTERNAL MEDICINE

## 2025-03-20 PROCEDURE — 2500000004 HC RX 250 GENERAL PHARMACY W/ HCPCS (ALT 636 FOR OP/ED): Performed by: ORTHOPAEDIC SURGERY

## 2025-03-20 PROCEDURE — 36415 COLL VENOUS BLD VENIPUNCTURE: CPT | Performed by: INTERNAL MEDICINE

## 2025-03-20 RX ORDER — AMLODIPINE BESYLATE 5 MG/1
5 TABLET ORAL DAILY
Start: 2025-03-20 | End: 2025-03-20

## 2025-03-20 RX ORDER — AMLODIPINE BESYLATE 5 MG/1
5 TABLET ORAL DAILY
Qty: 90 TABLET | Refills: 3 | Status: SHIPPED | OUTPATIENT
Start: 2025-03-20

## 2025-03-20 RX ORDER — AMLODIPINE BESYLATE 5 MG/1
5 TABLET ORAL DAILY
Status: DISCONTINUED | OUTPATIENT
Start: 2025-03-20 | End: 2025-03-20 | Stop reason: HOSPADM

## 2025-03-20 RX ADMIN — SENNOSIDES 10 ML: 8.8 LIQUID ORAL at 09:53

## 2025-03-20 RX ADMIN — ACETAMINOPHEN 650 MG: 325 TABLET, FILM COATED ORAL at 09:53

## 2025-03-20 RX ADMIN — AMLODIPINE BESYLATE 5 MG: 5 TABLET ORAL at 09:57

## 2025-03-20 RX ADMIN — POLYETHYLENE GLYCOL 3350 17 G: 17 POWDER, FOR SOLUTION ORAL at 09:53

## 2025-03-20 RX ADMIN — ENOXAPARIN SODIUM 30 MG: 30 INJECTION SUBCUTANEOUS at 09:53

## 2025-03-20 RX ADMIN — ACETAMINOPHEN 650 MG: 325 TABLET, FILM COATED ORAL at 02:50

## 2025-03-20 RX ADMIN — OXYCODONE HYDROCHLORIDE AND ACETAMINOPHEN 0.5 TABLET: 5; 325 TABLET ORAL at 08:15

## 2025-03-20 RX ADMIN — METOPROLOL SUCCINATE 50 MG: 50 TABLET, EXTENDED RELEASE ORAL at 08:16

## 2025-03-20 ASSESSMENT — PAIN SCALES - GENERAL
PAINLEVEL_OUTOF10: 0 - NO PAIN
PAINLEVEL_OUTOF10: 7

## 2025-03-20 ASSESSMENT — PAIN DESCRIPTION - ORIENTATION: ORIENTATION: RIGHT

## 2025-03-20 ASSESSMENT — PAIN - FUNCTIONAL ASSESSMENT: PAIN_FUNCTIONAL_ASSESSMENT: 0-10

## 2025-03-20 ASSESSMENT — PAIN DESCRIPTION - LOCATION: LOCATION: HIP

## 2025-03-20 NOTE — CARE PLAN
The patient's goals for the shift include sleep    The clinical goals for the shift include Pain Management; Maintain Comfort and safety measures      Problem: Pain - Adult  Goal: Verbalizes/displays adequate comfort level or baseline comfort level  Outcome: Progressing     Problem: Safety - Adult  Goal: Free from fall injury  Outcome: Progressing     Problem: Discharge Planning  Goal: Discharge to home or other facility with appropriate resources  Outcome: Progressing     Problem: Chronic Conditions and Co-morbidities  Goal: Patient's chronic conditions and co-morbidity symptoms are monitored and maintained or improved  Outcome: Progressing     Problem: Nutrition  Goal: Nutrient intake appropriate for maintaining nutritional needs  Outcome: Progressing     Problem: Skin  Goal: Decreased wound size/increased tissue granulation at next dressing change  Outcome: Progressing  Flowsheets (Taken 3/20/2025 0900)  Decreased wound size/increased tissue granulation at next dressing change: Promote sleep for wound healing  Goal: Participates in plan/prevention/treatment measures  Outcome: Progressing  Flowsheets (Taken 3/20/2025 1139)  Participates in plan/prevention/treatment measures: Elevate heels  Goal: Prevent/manage excess moisture  Outcome: Progressing  Flowsheets (Taken 3/20/2025 0900)  Prevent/manage excess moisture:   Moisturize dry skin   Cleanse incontinence/protect with barrier cream  Goal: Prevent/minimize sheer/friction injuries  Outcome: Progressing  Flowsheets (Taken 3/20/2025 0900)  Prevent/minimize sheer/friction injuries: Use pull sheet  Goal: Promote/optimize nutrition  Outcome: Progressing  Flowsheets (Taken 3/20/2025 0900)  Promote/optimize nutrition:   Consume > 50% meals/supplements   Monitor/record intake including meals   Offer water/supplements/favorite foods  Goal: Promote skin healing  Outcome: Progressing  Flowsheets (Taken 3/20/2025 0900)  Promote skin healing: Assess skin/pad under  line(s)/device(s)

## 2025-03-20 NOTE — CARE PLAN
The patient's goals for the shift include dementia patient    The clinical goals for the shift include Pt will remain safe and comfortable during the shift      Problem: Pain - Adult  Goal: Verbalizes/displays adequate comfort level or baseline comfort level  Outcome: Progressing     Problem: Safety - Adult  Goal: Free from fall injury  Outcome: Progressing     Problem: Discharge Planning  Goal: Discharge to home or other facility with appropriate resources  Outcome: Progressing     Problem: Chronic Conditions and Co-morbidities  Goal: Patient's chronic conditions and co-morbidity symptoms are monitored and maintained or improved  Outcome: Progressing     Problem: Nutrition  Goal: Nutrient intake appropriate for maintaining nutritional needs  Outcome: Progressing     Problem: Skin  Goal: Decreased wound size/increased tissue granulation at next dressing change  3/19/2025 2211 by Cortney Ramirez RN  Outcome: Progressing  3/19/2025 2211 by Cortney Ramirez RN  Flowsheets (Taken 3/19/2025 2211)  Decreased wound size/increased tissue granulation at next dressing change: Promote sleep for wound healing  Goal: Participates in plan/prevention/treatment measures  3/19/2025 2211 by Cortney Ramirez RN  Outcome: Progressing  3/19/2025 2211 by Cortney Ramirez RN  Flowsheets (Taken 3/19/2025 2211)  Participates in plan/prevention/treatment measures: Elevate heels  Goal: Prevent/manage excess moisture  3/19/2025 2211 by Cortney Ramirez RN  Outcome: Progressing  3/19/2025 2211 by Cortney Ramirez RN  Flowsheets (Taken 3/19/2025 2211)  Prevent/manage excess moisture: Moisturize dry skin  Goal: Prevent/minimize sheer/friction injuries  3/19/2025 2211 by Cortney Ramirez RN  Outcome: Progressing  3/19/2025 2211 by Cortney Ramirez RN  Flowsheets (Taken 3/19/2025 2211)  Prevent/minimize sheer/friction injuries: Use pull sheet  Goal: Promote/optimize nutrition  3/19/2025 2211 by Cortney Ramirez RN  Outcome: Progressing  3/19/2025 2211 by Cortney  BRITTANY Ramirez  Flowsheets (Taken 3/19/2025 2211)  Promote/optimize nutrition:   Offer water/supplements/favorite foods   Monitor/record intake including meals  Goal: Promote skin healing  3/19/2025 2211 by Cortney Ramirez RN  Outcome: Progressing  3/19/2025 2211 by Cortney Ramirez RN  Flowsheets (Taken 3/19/2025 2211)  Promote skin healing: Assess skin/pad under line(s)/device(s)

## (undated) DEVICE — DRAPE, LEGGINGS, 48 X 31 IN, STERILE, LF

## (undated) DEVICE — BAG, BANDED, 36IN X 28IN

## (undated) DEVICE — DRILL BIT, 4.2MM 3-FLUTED QC 330MM 100MM CALB

## (undated) DEVICE — WOUND SYSTEM, DEBRIDEMENT & CLEANING, O.R DUOPAK

## (undated) DEVICE — DRAPE COVER, C ARM, FLOUROSCAN IMAGING SYS

## (undated) DEVICE — KIT, TABLE, HANA

## (undated) DEVICE — Device

## (undated) DEVICE — STRIP, SKIN CLOSURE, STERI STRIP, REINFORCED, 1 X 5 IN

## (undated) DEVICE — C-ARMOR C-ARM DRAPE (FORMERLY CONTOUR FABRICATORS, INC. / CFI MEDICAL SOLUTIONS)

## (undated) DEVICE — APPLICATOR, CHLORAPREP, W/ORANGE TINT, 26ML

## (undated) DEVICE — GUIDEWIRE, 3.2 X 400

## (undated) DEVICE — DRESSING, MEPILEX BORDER, POST-OP AG, 4 X 10 IN

## (undated) DEVICE — PADDING, CAST, WYTEX, 4 IN X 4 YD, LF

## (undated) DEVICE — DRAPE, SHEET, THREE QUARTER, FAN FOLD, 57 X 77 IN

## (undated) DEVICE — BANDAGE, COFLEX, 6 X 5 YDS, FOAM TAN, STERILE, LF

## (undated) DEVICE — SLEEVE, VASO PRESS, CALF GARMENT, MEDIUM, GREEN

## (undated) DEVICE — STAPLER, SKIN PROXIMATE, 35 WIDE